# Patient Record
Sex: MALE | Race: WHITE | NOT HISPANIC OR LATINO | Employment: FULL TIME | URBAN - METROPOLITAN AREA
[De-identification: names, ages, dates, MRNs, and addresses within clinical notes are randomized per-mention and may not be internally consistent; named-entity substitution may affect disease eponyms.]

---

## 2020-04-27 DIAGNOSIS — Z00.00 PREVENTATIVE HEALTH CARE: Primary | ICD-10-CM

## 2020-05-05 ENCOUNTER — LAB VISIT (OUTPATIENT)
Dept: LAB | Facility: HOSPITAL | Age: 60
End: 2020-05-05
Attending: INTERNAL MEDICINE

## 2020-05-05 DIAGNOSIS — Z00.00 PREVENTATIVE HEALTH CARE: ICD-10-CM

## 2020-05-05 LAB — SARS-COV-2 IGG SERPLBLD QL IA.RAPID: NEGATIVE

## 2020-05-05 PROCEDURE — 36415 COLL VENOUS BLD VENIPUNCTURE: CPT

## 2020-05-05 PROCEDURE — 86769 SARS-COV-2 COVID-19 ANTIBODY: CPT

## 2023-03-03 ENCOUNTER — TELEPHONE (OUTPATIENT)
Dept: HEMATOLOGY/ONCOLOGY | Facility: CLINIC | Age: 63
End: 2023-03-03
Payer: COMMERCIAL

## 2023-03-03 NOTE — TELEPHONE ENCOUNTER
----- Message from Zuly Smith sent at 2/24/2023 11:06 AM CST -----  Regarding: Outside Referral  GM. Mr. Fischer has been diagnosed with prostate cancer. He is getting his scans done this Tuesday from his Urologist and would like to come see someone at [facility] asap shortly after. Can you please call Mr. Fischer to get more info of who's been treating him and how to get his records over? [phone]  Thank you.

## 2023-03-03 NOTE — TELEPHONE ENCOUNTER
Oncology nurse navigator contacted patient for scheduling referral by member of the [facility] board. All appointments scheduled for [facility] clinic on morning of March 14. Date, time, and location discussed with patient. All questions/concerns addressed. Patient verbalized understanding. Contact information reviewed for further assistance.          Late entry- initial contact with patient on 2/27/23- discussed upcoming scans scheduled at [facility] and [facility]. Patient will request disc upon completion. Contact information provided and informed that he would be contacted for scheduling after scan competion.

## 2023-03-06 NOTE — PROGRESS NOTES
Subjective:       Patient ID: Jose Fischer    Chief Complaint: Consult    HPI    Jose Fischer is a 62 y.o. male, referred by Self, Aaareferral, to clinic for evaluation and management of newly dx prostate cancer.     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  No symptoms.       Notes strong fly hx of cancer-- father had colon cancer in his 20s and pancreatic cancer in his 60s.  Multiple other family members with cancer as well.      Oncologic History:      9/30/2016 TRUS prostate biopsy (Dr Samaniego) benign     11/21/2022 PSA 17.72     2/14/2023 TRUS prostate biopsy (Dr Zane Oreilly, [facility] [place] [place])              6/12 starndard corse involved (left only) GS 9 =5+4 (GG5)     2/28/2023 NM bone scan     2/28/2023 CT A/P      Review of Systems   Constitutional:  Negative for appetite change and unexpected weight change.   HENT:  Negative for mouth sores.    Eyes:  Negative for visual disturbance.   Respiratory:  Negative for cough and shortness of breath.    Cardiovascular:  Negative for chest pain.   Gastrointestinal:  Negative for abdominal pain and diarrhea.   Genitourinary:  Positive for frequency.   Musculoskeletal:  Negative for back pain.   Skin:  Negative for rash.   Neurological:  Negative for headaches.   Hematological:  Negative for adenopathy.   Psychiatric/Behavioral:  The patient is not nervous/anxious.        Allergies:  Review of patient's allergies indicates:  No Known Allergies    Medications:  Current Outpatient Medications   Medication Sig Dispense Refill    sildenafiL (VIAGRA) 50 MG tablet Take 50 mg by mouth daily as needed for Erectile Dysfunction.       No current facility-administered medications for this visit.       PMH:  Past Medical History:   Diagnosis Date    Depression     Prostate cancer        PSH:  Past Surgical History:   Procedure Laterality Date     PROSTATE BIOPSY  02/14/2023       FamHx:  No family history on file.    SocHx:  Social History     Socioeconomic History    Marital status: Single   Tobacco Use    Smoking status: Former    Smokeless tobacco: Never   Substance and Sexual Activity    Alcohol use: No    Drug use: No    Sexual activity: Not Currently     Partners: Female       Objective:      /68 (BP Location: Left arm, Patient Position: Sitting, BP Method: Medium (Automatic))   Pulse (!) 54   Temp 97.7 °F (36.5 °C) (Oral)   Resp 18   Ht 6' (1.829 m)   Wt 68.4 kg (150 lb 12.7 oz)   SpO2 98%   BMI 20.45 kg/m²     Physical Exam  Vitals and nursing note reviewed.   Constitutional:       General: He is not in acute distress.     Appearance: Normal appearance. He is normal weight. He is not ill-appearing, toxic-appearing or diaphoretic.   HENT:      Head: Normocephalic.      Nose: Nose normal.   Eyes:      General: No scleral icterus.        Right eye: No discharge.         Left eye: No discharge.      Conjunctiva/sclera: Conjunctivae normal.   Musculoskeletal:         General: No swelling, tenderness or deformity.   Skin:     Coloration: Skin is not jaundiced or pale.      Findings: No bruising.   Neurological:      General: No focal deficit present.      Mental Status: He is alert and oriented to person, place, and time.   Psychiatric:         Mood and Affect: Mood normal.         Behavior: Behavior normal.         Thought Content: Thought content normal.         Judgment: Judgment normal.         LABS:  No results found for: WBC, HGB, HCT, PLT    Chemistry    No results found for: NA, K, CL, CO2, BUN, CREATININE, GLU No results found for: CALCIUM, ALKPHOS, AST, ALT, BILITOT, ESTGFRAFRICA, EGFRNONAA         Assessment:       1. Prostate cancer          Plan:       Prostate Cancer:    As above, high-risk Arlen 9, PSA 17 prostate cancer.      Discussed options for localized prostate cancer XRT+ADT vs RP    Advise imaging to complete staging  and r/o oligometastatic disease (CT/bone scan +/- PSMA scans).      Also needs genetic testing given dx and fly hx, will refer to hereditary clinic.      Pt also seeing uro onc and rad onc today.      RTC pending results of imaging.      The patient agrees with the plan, and all questions have been answered to their satisfaction.      More than 60 mins total time were spent during this encounter.    Ronald Elizabeth M.D., M.S., F.A.C.P.  Hematology and Oncology Attending  [facility]            Route Chart for Scheduling    Med Onc Chart Routing      Follow up with physician Other. RTC pending - REFER to genetics (hereditary/high risk clinic) ASAP.   Follow up with BLAISE    Infusion scheduling note    Injection scheduling note    Labs    Imaging    Pharmacy appointment    Other referrals  Additional referrals needed

## 2023-03-14 ENCOUNTER — OFFICE VISIT (OUTPATIENT)
Dept: HEMATOLOGY/ONCOLOGY | Facility: CLINIC | Age: 63
End: 2023-03-14
Payer: COMMERCIAL

## 2023-03-14 ENCOUNTER — OFFICE VISIT (OUTPATIENT)
Dept: RADIATION ONCOLOGY | Facility: CLINIC | Age: 63
End: 2023-03-14
Payer: COMMERCIAL

## 2023-03-14 ENCOUNTER — OFFICE VISIT (OUTPATIENT)
Dept: UROLOGY | Facility: CLINIC | Age: 63
End: 2023-03-14
Payer: COMMERCIAL

## 2023-03-14 VITALS
SYSTOLIC BLOOD PRESSURE: 110 MMHG | HEIGHT: 72 IN | DIASTOLIC BLOOD PRESSURE: 68 MMHG | RESPIRATION RATE: 16 BRPM | BODY MASS INDEX: 20.43 KG/M2 | WEIGHT: 150.81 LBS | HEART RATE: 54 BPM

## 2023-03-14 VITALS
BODY MASS INDEX: 20.43 KG/M2 | HEART RATE: 54 BPM | TEMPERATURE: 98 F | SYSTOLIC BLOOD PRESSURE: 110 MMHG | OXYGEN SATURATION: 98 % | WEIGHT: 150.81 LBS | HEIGHT: 72 IN | DIASTOLIC BLOOD PRESSURE: 68 MMHG | RESPIRATION RATE: 18 BRPM

## 2023-03-14 VITALS
HEIGHT: 72 IN | HEART RATE: 54 BPM | SYSTOLIC BLOOD PRESSURE: 109 MMHG | DIASTOLIC BLOOD PRESSURE: 63 MMHG | BODY MASS INDEX: 20.63 KG/M2

## 2023-03-14 DIAGNOSIS — C61 PROSTATE CANCER: Primary | ICD-10-CM

## 2023-03-14 DIAGNOSIS — C61 PROSTATE CANCER: ICD-10-CM

## 2023-03-14 PROCEDURE — 1160F RVW MEDS BY RX/DR IN RCRD: CPT | Mod: CPTII,S$GLB,, | Performed by: STUDENT IN AN ORGANIZED HEALTH CARE EDUCATION/TRAINING PROGRAM

## 2023-03-14 PROCEDURE — 3078F PR MOST RECENT DIASTOLIC BLOOD PRESSURE < 80 MM HG: ICD-10-PCS | Mod: CPTII,S$GLB,, | Performed by: UROLOGY

## 2023-03-14 PROCEDURE — 1160F PR REVIEW ALL MEDS BY PRESCRIBER/CLIN PHARMACIST DOCUMENTED: ICD-10-PCS | Mod: CPTII,S$GLB,, | Performed by: UROLOGY

## 2023-03-14 PROCEDURE — 99215 OFFICE O/P EST HI 40 MIN: CPT | Mod: S$GLB,,, | Performed by: INTERNAL MEDICINE

## 2023-03-14 PROCEDURE — 1159F MED LIST DOCD IN RCRD: CPT | Mod: CPTII,S$GLB,, | Performed by: STUDENT IN AN ORGANIZED HEALTH CARE EDUCATION/TRAINING PROGRAM

## 2023-03-14 PROCEDURE — 3078F PR MOST RECENT DIASTOLIC BLOOD PRESSURE < 80 MM HG: ICD-10-PCS | Mod: CPTII,S$GLB,, | Performed by: INTERNAL MEDICINE

## 2023-03-14 PROCEDURE — 3074F SYST BP LT 130 MM HG: CPT | Mod: CPTII,S$GLB,, | Performed by: UROLOGY

## 2023-03-14 PROCEDURE — 3008F BODY MASS INDEX DOCD: CPT | Mod: CPTII,S$GLB,, | Performed by: UROLOGY

## 2023-03-14 PROCEDURE — 3074F PR MOST RECENT SYSTOLIC BLOOD PRESSURE < 130 MM HG: ICD-10-PCS | Mod: CPTII,S$GLB,, | Performed by: UROLOGY

## 2023-03-14 PROCEDURE — 99999 PR PBB SHADOW E&M-EST. PATIENT-LVL IV: CPT | Mod: PBBFAC,,, | Performed by: INTERNAL MEDICINE

## 2023-03-14 PROCEDURE — 99999 PR PBB SHADOW E&M-EST. PATIENT-LVL III: CPT | Mod: PBBFAC,,, | Performed by: STUDENT IN AN ORGANIZED HEALTH CARE EDUCATION/TRAINING PROGRAM

## 2023-03-14 PROCEDURE — 1159F MED LIST DOCD IN RCRD: CPT | Mod: CPTII,S$GLB,, | Performed by: UROLOGY

## 2023-03-14 PROCEDURE — 3078F DIAST BP <80 MM HG: CPT | Mod: CPTII,S$GLB,, | Performed by: STUDENT IN AN ORGANIZED HEALTH CARE EDUCATION/TRAINING PROGRAM

## 2023-03-14 PROCEDURE — 99205 OFFICE O/P NEW HI 60 MIN: CPT | Mod: S$GLB,,, | Performed by: STUDENT IN AN ORGANIZED HEALTH CARE EDUCATION/TRAINING PROGRAM

## 2023-03-14 PROCEDURE — 3008F BODY MASS INDEX DOCD: CPT | Mod: CPTII,S$GLB,, | Performed by: INTERNAL MEDICINE

## 2023-03-14 PROCEDURE — 1160F RVW MEDS BY RX/DR IN RCRD: CPT | Mod: CPTII,S$GLB,, | Performed by: UROLOGY

## 2023-03-14 PROCEDURE — 99205 PR OFFICE/OUTPT VISIT, NEW, LEVL V, 60-74 MIN: ICD-10-PCS | Mod: S$GLB,,, | Performed by: UROLOGY

## 2023-03-14 PROCEDURE — 3074F SYST BP LT 130 MM HG: CPT | Mod: CPTII,S$GLB,, | Performed by: STUDENT IN AN ORGANIZED HEALTH CARE EDUCATION/TRAINING PROGRAM

## 2023-03-14 PROCEDURE — 3008F PR BODY MASS INDEX (BMI) DOCUMENTED: ICD-10-PCS | Mod: CPTII,S$GLB,, | Performed by: STUDENT IN AN ORGANIZED HEALTH CARE EDUCATION/TRAINING PROGRAM

## 2023-03-14 PROCEDURE — 99999 PR PBB SHADOW E&M-EST. PATIENT-LVL IV: ICD-10-PCS | Mod: PBBFAC,,, | Performed by: INTERNAL MEDICINE

## 2023-03-14 PROCEDURE — 99205 OFFICE O/P NEW HI 60 MIN: CPT | Mod: S$GLB,,, | Performed by: UROLOGY

## 2023-03-14 PROCEDURE — 3008F PR BODY MASS INDEX (BMI) DOCUMENTED: ICD-10-PCS | Mod: CPTII,S$GLB,, | Performed by: UROLOGY

## 2023-03-14 PROCEDURE — 3078F DIAST BP <80 MM HG: CPT | Mod: CPTII,S$GLB,, | Performed by: UROLOGY

## 2023-03-14 PROCEDURE — 1159F PR MEDICATION LIST DOCUMENTED IN MEDICAL RECORD: ICD-10-PCS | Mod: CPTII,S$GLB,, | Performed by: STUDENT IN AN ORGANIZED HEALTH CARE EDUCATION/TRAINING PROGRAM

## 2023-03-14 PROCEDURE — 99205 PR OFFICE/OUTPT VISIT, NEW, LEVL V, 60-74 MIN: ICD-10-PCS | Mod: S$GLB,,, | Performed by: STUDENT IN AN ORGANIZED HEALTH CARE EDUCATION/TRAINING PROGRAM

## 2023-03-14 PROCEDURE — 1160F PR REVIEW ALL MEDS BY PRESCRIBER/CLIN PHARMACIST DOCUMENTED: ICD-10-PCS | Mod: CPTII,S$GLB,, | Performed by: STUDENT IN AN ORGANIZED HEALTH CARE EDUCATION/TRAINING PROGRAM

## 2023-03-14 PROCEDURE — 1159F PR MEDICATION LIST DOCUMENTED IN MEDICAL RECORD: ICD-10-PCS | Mod: CPTII,S$GLB,, | Performed by: UROLOGY

## 2023-03-14 PROCEDURE — 3078F DIAST BP <80 MM HG: CPT | Mod: CPTII,S$GLB,, | Performed by: INTERNAL MEDICINE

## 2023-03-14 PROCEDURE — 3074F PR MOST RECENT SYSTOLIC BLOOD PRESSURE < 130 MM HG: ICD-10-PCS | Mod: CPTII,S$GLB,, | Performed by: INTERNAL MEDICINE

## 2023-03-14 PROCEDURE — 3074F PR MOST RECENT SYSTOLIC BLOOD PRESSURE < 130 MM HG: ICD-10-PCS | Mod: CPTII,S$GLB,, | Performed by: STUDENT IN AN ORGANIZED HEALTH CARE EDUCATION/TRAINING PROGRAM

## 2023-03-14 PROCEDURE — 3074F SYST BP LT 130 MM HG: CPT | Mod: CPTII,S$GLB,, | Performed by: INTERNAL MEDICINE

## 2023-03-14 PROCEDURE — 99999 PR PBB SHADOW E&M-EST. PATIENT-LVL III: ICD-10-PCS | Mod: PBBFAC,,, | Performed by: UROLOGY

## 2023-03-14 PROCEDURE — 3008F PR BODY MASS INDEX (BMI) DOCUMENTED: ICD-10-PCS | Mod: CPTII,S$GLB,, | Performed by: INTERNAL MEDICINE

## 2023-03-14 PROCEDURE — 99215 PR OFFICE/OUTPT VISIT, EST, LEVL V, 40-54 MIN: ICD-10-PCS | Mod: S$GLB,,, | Performed by: INTERNAL MEDICINE

## 2023-03-14 PROCEDURE — 99999 PR PBB SHADOW E&M-EST. PATIENT-LVL III: ICD-10-PCS | Mod: PBBFAC,,, | Performed by: STUDENT IN AN ORGANIZED HEALTH CARE EDUCATION/TRAINING PROGRAM

## 2023-03-14 PROCEDURE — 3008F BODY MASS INDEX DOCD: CPT | Mod: CPTII,S$GLB,, | Performed by: STUDENT IN AN ORGANIZED HEALTH CARE EDUCATION/TRAINING PROGRAM

## 2023-03-14 PROCEDURE — 3078F PR MOST RECENT DIASTOLIC BLOOD PRESSURE < 80 MM HG: ICD-10-PCS | Mod: CPTII,S$GLB,, | Performed by: STUDENT IN AN ORGANIZED HEALTH CARE EDUCATION/TRAINING PROGRAM

## 2023-03-14 PROCEDURE — 99999 PR PBB SHADOW E&M-EST. PATIENT-LVL III: CPT | Mod: PBBFAC,,, | Performed by: UROLOGY

## 2023-03-14 RX ORDER — SILDENAFIL 50 MG/1
50 TABLET, FILM COATED ORAL DAILY PRN
COMMUNITY
End: 2023-09-08

## 2023-03-14 NOTE — PROGRESS NOTES
Radiation Oncology Consult Note         Date of Service: 03/14/2023    Chief Complaint: prostate cancer     Reason for visit: consideration for radiation to the pelvis     Referring Physician: self referred (last saw Dr Zane Oreilly, [facility], [place])     Implantable devices: denies    Therapy to Date:  No radiation    Diagnosis/Assessment:   Jose Fischer is a 62 y.o. man with very high risk prostate adenocarcinoma Stage (cT1c, cM0, PSA: 17.7, Grade Group: 5), s/p TRUS prostate biopsy (Dr Zane Oreilly, [facility], [place] on 2/14/2023) with 6/12 standard corse involved (left only) GS 9 =5+4 (GG5)    PMH of vasectomy and ED (on Viagra)    MSK nomogram risk EPE, SVI, LNI (%,%,%): 81,47,40    ECOG 0    Plan   We discussed treatment options per NCCN guidelines v2023:  - EBRT + long-term ADT (1.5 - 3 years) + zytiga/pred  - RP +/- PLND     EBRT would consist of daily radiation treatments M-F, 70Gy in 28 fractions to the prostate gland + regional pelvic lymph nodes.      Risks, benefits, and side effects of radiotherapy were discussed.   Side effects include but are not limited to fatigue, erythema, hyperpigmentation, desquamation within the radiation field, more frequent urination, both during the day and at night, urgency with urination, hematuria, dysuria, and a sensation of incomplete emptying.   In addition, the patient will be at risk for increased frequency and/or loose bowel movements.   All of these side effects will go away in the short term.   In the long term, the patient is at risk for radiation cystitis, radiation proctitis, and erectile dysfunction.     The patient would like to think about his options, leaning to surgery as he dreads getting ED on ADT  His urinary issues are expected to improve on ADT, as I believe they are cancer related    Agree with PSMA to get more accurate hosea and metastatic staging     - recommend MRI if surgery considered  - last colonoscopy 1 year ago at OSH  -  "Epic- 26 survey filled on line  - return to [facility]        HPI:   Jose Fischer is a 62 y.o. man with recent diagnosis of prostate cancer after presenting with elevated PSA.    Oncologic history:    9/30/2016 TRUS prostate biopsy (Dr Samaniego) benign    11/21/2022 PSA 17.72    2/14/2023 TRUS prostate biopsy (Dr Zane Oreilly, [facility], [place])   6/12 standard corse involved (left only) GS 9 =5+4 (GG5)    2/28/2023 NM bone scan (outside)  No evidence of mets    2/28/2023 CT A/P   No mets  Subjective:   In clinic the patient is accompanied by his cousin Karla Craven    The patient reports feeling "good"  The patient denies major bowel or sexual function issues (on Viagra with good response)  He reports significant LUTS (see AUA), no dysuria  Not on Flomax  No issues with NSAIDs  Has regular BMs qAM  The patient denies other major complaints    AUA score 23 (3,5,3,4,2,1,5) mostly satisfied  AMALIA score 25 (5,5,5,5,5) no ED     The patient denies any history of radiation therapy, implantable cardiac devices, or connective tissue disease.    Social history      From [place]    Family history  Father CRC and pancreatic cancer    Current Outpatient Medications on File Prior to Visit   Medication Sig Dispense Refill    sildenafiL (VIAGRA) 50 MG tablet Take 50 mg by mouth daily as needed for Erectile Dysfunction.      [DISCONTINUED] ciprofloxacin HCl (CIPRO) 500 MG tablet Take one night before and morning of prostate biopsy.  Continue taking it twice a day. 4 tablet 0    [DISCONTINUED] LATUDA 40 mg Tab tablet TAKE 1 TABLET BY MOUTH DAILY AFTER A MEAL  5    [DISCONTINUED] lorazepam (ATIVAN) 1 MG tablet TK 1 T PO QD  5    [DISCONTINUED] venlafaxine (EFFEXOR) 75 MG tablet TK 1 T PO BID WITH MEALS  5     No current facility-administered medications on file prior to visit.     Review of patient's allergies indicates:  No Known Allergies    Past Surgical History:   Procedure Laterality Date    PROSTATE " BIOPSY  02/14/2023       Past Medical History:   Diagnosis Date    Depression     Prostate cancer        Review of systems  Constitutional:  Negative for fatigue, fever and unexpected weight change.   HENT:  Negative for hearing loss and sinus pressure/congestion.    Eyes:  Negative for visual disturbance.   Respiratory:  Negative for cough, shortness of breath and wheezing.    Cardiovascular:  Negative for chest pain, palpitations and leg swelling.   Gastrointestinal:  Negative for abdominal pain, blood in stool, constipation, diarrhea, nausea, vomiting and reflux.   Genitourinary:  Positive for frequency and urgency. Negative for dysuria, erectile dysfunction and hematuria.   Musculoskeletal:  Negative for arthralgias, back pain and neck pain.   Integumentary:  Negative for rash.   Neurological:  Negative for dizziness, seizures, speech difficulty, weakness, numbness, headaches and memory loss.   Psychiatric/Behavioral:  Negative for dysphoric mood. The patient is not nervous/anxious.          Objective:      Physical Exam  Vitals reviewed.     Constitutional:       Appearance: Normal appearance.   HENT:      Head: Normocephalic and atraumatic.   Eyes:      Conjunctiva/sclera: Conjunctivae normal.   Pulmonary:      Effort: Pulmonary effort is normal.   Abdominal:      General: There is no distension.   Genitourinary:     Comments: GAURANG deferred  Musculoskeletal:         General: Normal range of motion.  Neurological:      General: No focal deficit present.      Mental Status: Alert and oriented  Psychiatric:         Mood and Affect: Mood normal.         Behavior: Behavior normal.      Imaging: I have personally reviewed the patient's available images and reports and summarized pertinent findings above in HPI.      Pathology: I have personally reviewed the patient's available pathology and summarized pertinent findings above in HPI.      Laboratory: I have personally reviewed the patient's available laboratory  values and summarized pertinent findings above in HPI.         I spent approximately 60 minutes reviewing the available records and evaluating the patient, out of which over 50% of the time was spent face to face with the patient in counseling and coordinating this patient's care.     Thank you for the opportunity to care for this patient. Please do not hesitate to contact me with any questions.     Ramiro Lake MD/PhD

## 2023-03-14 NOTE — PROGRESS NOTES
Subjective:       Patient ID: Jose Fischer is a 62 y.o. male.    Chief Complaint:  Prostate cancer    History of Present Illness  62M w/ hx of recently diagnosed very high risk prostate cancer presents for [facility] visit.  He also has appointments today with med onc, rad onc.    He presented initially with difficulty initiating strea, nocturia x8-10, and elevated PSA to 17.7.  Prostate biopsy 2/17/23 at [facility] showing 5+4 in LB (28% involvement) 2/2 cores, 4+3 in LM (21% involvement) 2/2 cores, 4+5 in LA (52% involvement) 2/2 cores, 6/12 cores total.    Bone Scan 2/28/23 with no evidence of mets.   CTAP 2/28/23 significant for 4 mm intrarenal L kidney stone and 3 mm non obstructing R UVJ stone.  No evidence of mets. He denies a hx of stones and is asymptomatic from a stone perspective.  He also reports that his urinary symptoms have mostly resolved recently following his prostate ca diagnosis. He does have a history of erectile dysfunction at baseline and currently takes sildenafil.       Denies having any other recent imaging including PSMA or prostate MRI.  He also denies any genetic testing.       Has a remote hx of negative TRUS biopsy in 2016 with Dr. Samaniego.  His PSA at that time was 3.8.     No FH of prostate cancer.   Overall healthy, no significant past medical history.  No blood thinners.    PSH: none         Review of Systems  Review of Systems   Constitutional:  Negative for chills, fever and unexpected weight change.   Genitourinary:  Positive for difficulty urinating. Negative for flank pain and hematuria.        Objective:     Physical Exam  Constitutional:       General: He is not in acute distress.  HENT:      Head: Normocephalic.   Eyes:      Extraocular Movements: Extraocular movements intact.   Cardiovascular:      Rate and Rhythm: Normal rate.   Pulmonary:      Effort: Pulmonary effort is normal. No respiratory distress.   Abdominal:      Palpations: Abdomen is soft.  There is no mass.      Tenderness: There is no abdominal tenderness.   Musculoskeletal:         General: No swelling or deformity.      Cervical back: Normal range of motion.   Skin:     General: Skin is warm and dry.   Neurological:      General: No focal deficit present.      Mental Status: He is alert.   Psychiatric:         Mood and Affect: Mood normal.         Behavior: Behavior normal.     Lab Review      No results found for: COLORU, SPECGRAV, PHUR, WBCUR, NITRITE, PROTEINUR, GLUCOSEUR, KETONESU, UROBILINOGEN, BILIRUBINUR, RBCUR    Last PSA 17.7     Assessment:        1. Prostate cancer              Plan:     Prostate cancer    Today's visit was spent almost entirely on counseling. 45 minutes of counseling time was spent.  We reviewed his diagnosis, stage, grade, and prognosis.  We reviewed the [facility] nomograms. We discussed the concept of low risk, moderate risk, and high risk disease. We discussed the different treatment options including active surveillance (as well as surveillance protocol), prostate brachytherapy,external bean radiation, and both open and robotic prostatectomy.We also discussed the advantages, disadvantages, risks and benefits of the different options. Regarding radiation therapy we discussed treatment planning, the different techniques, short and long term complications. These included radiation cystitis, radiation proctitis, and impotence. We discussed success, failure, and salvage therapeutic options.     We discussed surgical therapy in depth including preoperative preparation, surgical technique (including bladder neck and nerve-sparing techniques), postoperative recuperation and recovery, and short and long term complications. We discussed the risks of reoperation, incontinence and impotence. We discussed the chance of rectal injury, obturator nerve injury, and occult injury to the bowel during verress needle access.  We discussed preop and postop Kegels, post op penile  rehab, and treatment options for incontinence and impotence. We discussed success, failure and salvage therapeutic options. We discussed the possible  indications for adjuvant radiation therapy.    -CSS   15yr  68%  PFS    5 yr   27%     10yr   16%  OCD  10%  SRIDEVI   87%  LN+    40%  SV+    41%      At the conclusion of our discussion, the patient was interested in surgery.  However, we did extensively explain the high risk of primary surgical failure in need for multimodal therapy.  In addition, we discussed that given his significant chance of locally advanced prostate cancer even metastatic prostate cancer given the high-grade and high PSA, we will get advanced imaging with a PSMA pet scan to evaluate for metastatic disease..     I answered all his questions.    I encouraged him or any of his family members to call or email me with questions and/or concerns.    Patient to see heme Onc and Radiation Oncology today    The patient was seen and examined with the resident physician.  All questions were answered.  The plan was discussed with the patient and I concur with the resident physician's documentation.

## 2023-03-15 ENCOUNTER — PATIENT MESSAGE (OUTPATIENT)
Dept: HEMATOLOGY/ONCOLOGY | Facility: CLINIC | Age: 63
End: 2023-03-15
Payer: COMMERCIAL

## 2023-03-21 ENCOUNTER — PATIENT MESSAGE (OUTPATIENT)
Dept: UROLOGY | Facility: CLINIC | Age: 63
End: 2023-03-21
Payer: COMMERCIAL

## 2023-03-24 ENCOUNTER — LAB VISIT (OUTPATIENT)
Dept: LAB | Facility: HOSPITAL | Age: 63
End: 2023-03-24
Attending: UROLOGY
Payer: COMMERCIAL

## 2023-03-24 DIAGNOSIS — C61 PROSTATE CANCER: Primary | ICD-10-CM

## 2023-03-24 DIAGNOSIS — C61 PROSTATE CANCER: ICD-10-CM

## 2023-03-24 PROCEDURE — 88321 PR  MICROSLIDE CONSULT: ICD-10-PCS | Mod: ,,, | Performed by: PATHOLOGY

## 2023-03-24 PROCEDURE — 88321 CONSLTJ&REPRT SLD PREP ELSWR: CPT | Mod: ,,, | Performed by: PATHOLOGY

## 2023-03-24 PROCEDURE — 88321 CONSLTJ&REPRT SLD PREP ELSWR: CPT | Performed by: PATHOLOGY

## 2023-03-27 ENCOUNTER — TELEPHONE (OUTPATIENT)
Dept: HEMATOLOGY/ONCOLOGY | Facility: CLINIC | Age: 63
End: 2023-03-27
Payer: COMMERCIAL

## 2023-03-27 LAB
FINAL PATHOLOGIC DIAGNOSIS: NORMAL
Lab: NORMAL
MICROSCOPIC EXAM: NORMAL

## 2023-03-27 NOTE — TELEPHONE ENCOUNTER
Oncology nurse navigator received incoming call with questions regarding PSMA scan. All questions answered. Patient agreed to contact our team if anything further is needed.

## 2023-03-31 ENCOUNTER — HOSPITAL ENCOUNTER (OUTPATIENT)
Dept: RADIOLOGY | Facility: HOSPITAL | Age: 63
Discharge: HOME | End: 2023-03-31
Attending: UROLOGY
Payer: COMMERCIAL

## 2023-03-31 DIAGNOSIS — C61 PROSTATE CANCER: ICD-10-CM

## 2023-03-31 PROCEDURE — 78815 NM PET CT F 18 PYL PSMA, MIDTHIGH TO VERTEX: ICD-10-PCS | Mod: 26,PI,, | Performed by: RADIOLOGY

## 2023-03-31 PROCEDURE — 78815 PET IMAGE W/CT SKULL-THIGH: CPT | Mod: TC

## 2023-03-31 PROCEDURE — 78815 PET IMAGE W/CT SKULL-THIGH: CPT | Mod: 26,PI,, | Performed by: RADIOLOGY

## 2023-04-03 ENCOUNTER — PATIENT MESSAGE (OUTPATIENT)
Dept: UROLOGY | Facility: CLINIC | Age: 63
End: 2023-04-03
Payer: COMMERCIAL

## 2023-04-04 ENCOUNTER — LAB VISIT (OUTPATIENT)
Dept: LAB | Facility: HOSPITAL | Age: 63
End: 2023-04-04
Attending: UROLOGY
Payer: COMMERCIAL

## 2023-04-04 ENCOUNTER — OFFICE VISIT (OUTPATIENT)
Dept: UROLOGY | Facility: CLINIC | Age: 63
End: 2023-04-04
Payer: COMMERCIAL

## 2023-04-04 VITALS
SYSTOLIC BLOOD PRESSURE: 101 MMHG | HEIGHT: 72 IN | DIASTOLIC BLOOD PRESSURE: 57 MMHG | BODY MASS INDEX: 20.77 KG/M2 | HEART RATE: 69 BPM | WEIGHT: 153.31 LBS

## 2023-04-04 DIAGNOSIS — N40.1 BPH WITH URINARY OBSTRUCTION: ICD-10-CM

## 2023-04-04 DIAGNOSIS — C61 PROSTATE CANCER: Primary | ICD-10-CM

## 2023-04-04 DIAGNOSIS — N13.8 BPH WITH URINARY OBSTRUCTION: ICD-10-CM

## 2023-04-04 DIAGNOSIS — C61 PROSTATE CANCER: ICD-10-CM

## 2023-04-04 LAB — COMPLEXED PSA SERPL-MCNC: 22.1 NG/ML (ref 0–4)

## 2023-04-04 PROCEDURE — 1160F RVW MEDS BY RX/DR IN RCRD: CPT | Mod: CPTII,S$GLB,, | Performed by: UROLOGY

## 2023-04-04 PROCEDURE — 3074F SYST BP LT 130 MM HG: CPT | Mod: CPTII,S$GLB,, | Performed by: UROLOGY

## 2023-04-04 PROCEDURE — 3074F PR MOST RECENT SYSTOLIC BLOOD PRESSURE < 130 MM HG: ICD-10-PCS | Mod: CPTII,S$GLB,, | Performed by: UROLOGY

## 2023-04-04 PROCEDURE — 1160F PR REVIEW ALL MEDS BY PRESCRIBER/CLIN PHARMACIST DOCUMENTED: ICD-10-PCS | Mod: CPTII,S$GLB,, | Performed by: UROLOGY

## 2023-04-04 PROCEDURE — 99215 PR OFFICE/OUTPT VISIT, EST, LEVL V, 40-54 MIN: ICD-10-PCS | Mod: S$GLB,,, | Performed by: UROLOGY

## 2023-04-04 PROCEDURE — 1159F MED LIST DOCD IN RCRD: CPT | Mod: CPTII,S$GLB,, | Performed by: UROLOGY

## 2023-04-04 PROCEDURE — 1159F PR MEDICATION LIST DOCUMENTED IN MEDICAL RECORD: ICD-10-PCS | Mod: CPTII,S$GLB,, | Performed by: UROLOGY

## 2023-04-04 PROCEDURE — 3078F PR MOST RECENT DIASTOLIC BLOOD PRESSURE < 80 MM HG: ICD-10-PCS | Mod: CPTII,S$GLB,, | Performed by: UROLOGY

## 2023-04-04 PROCEDURE — 99999 PR PBB SHADOW E&M-EST. PATIENT-LVL III: ICD-10-PCS | Mod: PBBFAC,,, | Performed by: UROLOGY

## 2023-04-04 PROCEDURE — 3008F PR BODY MASS INDEX (BMI) DOCUMENTED: ICD-10-PCS | Mod: CPTII,S$GLB,, | Performed by: UROLOGY

## 2023-04-04 PROCEDURE — 99999 PR PBB SHADOW E&M-EST. PATIENT-LVL III: CPT | Mod: PBBFAC,,, | Performed by: UROLOGY

## 2023-04-04 PROCEDURE — 99215 OFFICE O/P EST HI 40 MIN: CPT | Mod: S$GLB,,, | Performed by: UROLOGY

## 2023-04-04 PROCEDURE — 3008F BODY MASS INDEX DOCD: CPT | Mod: CPTII,S$GLB,, | Performed by: UROLOGY

## 2023-04-04 PROCEDURE — 36415 COLL VENOUS BLD VENIPUNCTURE: CPT | Performed by: UROLOGY

## 2023-04-04 PROCEDURE — 3078F DIAST BP <80 MM HG: CPT | Mod: CPTII,S$GLB,, | Performed by: UROLOGY

## 2023-04-04 PROCEDURE — 84153 ASSAY OF PSA TOTAL: CPT | Performed by: UROLOGY

## 2023-04-04 NOTE — PROGRESS NOTES
Subjective:       Patient ID: Jose Fischer is a 62 y.o. male.    Chief Complaint:  Prostate cancer    History of Present Illness  62M w/ hx of recently diagnosed very high risk prostate cancer presents for [facility] visit.  Previously had appointments with med onc, rad onc.    He requested a pathology re-review at [facility] which was performed and his prostate cancer was downgraded to [place] 4+3.     He presented initially with difficulty initiating strea, nocturia x8-10, and elevated PSA to 17.7.  Prostate biopsy 2/17/23 at [facility] showing 5+4 in LB (28% involvement) 2/2 cores, 4+3 in LM (21% involvement) 2/2 cores, 4+5 in LA (52% involvement) 2/2 cores, 6/12 cores total.    Bone Scan 2/28/23 with no evidence of mets.   CTAP 2/28/23 significant for 4 mm intrarenal L kidney stone and 3 mm non obstructing R UVJ stone.  No evidence of mets. He denies a hx of stones and is asymptomatic from a stone perspective.  He also reports that his urinary symptoms have mostly resolved recently following his prostate ca diagnosis. He does have a history of erectile dysfunction at baseline and currently takes sildenafil.       Denies having any other recent imaging including PSMA or prostate MRI.  He also denies any genetic testing.       Has a remote hx of negative TRUS biopsy in 2016 with Dr. Samaniego.  His PSA at that time was 3.8.     No FH of prostate cancer.   Overall healthy, no significant past medical history.  No blood thinners.    PSH: none           Review of Systems  Review of Systems   Constitutional:  Negative for chills, fever and unexpected weight change.   Genitourinary:  Positive for difficulty urinating. Negative for flank pain and hematuria.        Objective:     Physical Exam  Constitutional:       General: He is not in acute distress.  HENT:      Head: Normocephalic.   Eyes:      Extraocular Movements: Extraocular movements intact.   Cardiovascular:      Rate and Rhythm: Normal rate.    Pulmonary:      Effort: Pulmonary effort is normal. No respiratory distress.   Abdominal:      Palpations: Abdomen is soft. There is no mass.      Tenderness: There is no abdominal tenderness.   Musculoskeletal:         General: No swelling or deformity.      Cervical back: Normal range of motion.   Skin:     General: Skin is warm and dry.   Neurological:      General: No focal deficit present.      Mental Status: He is alert.   Psychiatric:         Mood and Affect: Mood normal.         Behavior: Behavior normal.     Lab Review      No results found for: COLORU, SPECGRAV, PHUR, WBCUR, NITRITE, PROTEINUR, GLUCOSEUR, KETONESU, UROBILINOGEN, BILIRUBINUR, RBCUR    Last PSA 17.7     Assessment:        1. Prostate cancer    2. BPH with urinary obstruction                Plan:     Prostate cancer  -     Prostate Specific Antigen, Diagnostic; Future; Expected date: 04/04/2023    BPH with urinary obstruction      Today's visit was spent almost entirely on counseling. 45 minutes of counseling time was spent.  We reviewed his diagnosis, stage, grade, and prognosis.  We reviewed the [facility] nomograms. We discussed the concept of low risk, moderate risk, and high risk disease. We discussed the different treatment options including active surveillance (as well as surveillance protocol), prostate brachytherapy,external bean radiation, and both open and robotic prostatectomy.We also discussed the advantages, disadvantages, risks and benefits of the different options. Regarding radiation therapy we discussed treatment planning, the different techniques, short and long term complications. These included radiation cystitis, radiation proctitis, and impotence. We discussed success, failure, and salvage therapeutic options.     We discussed surgical therapy in depth including preoperative preparation, surgical technique (including bladder neck and nerve-sparing techniques), postoperative recuperation and recovery, and short  and long term complications. We discussed the risks of reoperation, incontinence and impotence. We discussed the chance of rectal injury, obturator nerve injury, and occult injury to the bowel during verress needle access.  We discussed preop and postop Kegels, post op penile rehab, and treatment options for incontinence and impotence. We discussed success, failure and salvage therapeutic options. We discussed the possible  indications for adjuvant radiation therapy.    NOMOGRAM RESULTS based on Re-Review of biopsy pathology.   -CSS   15yr  83%  PFS    5 yr   42%     10yr   28%  OCD  23%  SRIDEVI   72%  LN+    21%  SV+    20%    -patient and family interested in repeat psa. Explained that the interpretation of psa may be difficult in the setting of recent prostate biopsy if the psa returns more elevated.     -patient interested in surgery----however, he wanted to discuss and consider his options with family.  He will notify us of the results.

## 2023-04-05 ENCOUNTER — PATIENT MESSAGE (OUTPATIENT)
Dept: UROLOGY | Facility: CLINIC | Age: 63
End: 2023-04-05
Payer: COMMERCIAL

## 2023-04-06 ENCOUNTER — PATIENT MESSAGE (OUTPATIENT)
Dept: HEMATOLOGY/ONCOLOGY | Facility: CLINIC | Age: 63
End: 2023-04-06
Payer: COMMERCIAL

## 2023-04-07 ENCOUNTER — PATIENT MESSAGE (OUTPATIENT)
Dept: RADIATION ONCOLOGY | Facility: CLINIC | Age: 63
End: 2023-04-07
Payer: COMMERCIAL

## 2023-04-11 ENCOUNTER — OFFICE VISIT (OUTPATIENT)
Dept: HEMATOLOGY/ONCOLOGY | Facility: CLINIC | Age: 63
End: 2023-04-11
Payer: COMMERCIAL

## 2023-04-11 ENCOUNTER — LAB VISIT (OUTPATIENT)
Dept: LAB | Facility: HOSPITAL | Age: 63
End: 2023-04-11
Attending: PHYSICIAN ASSISTANT
Payer: COMMERCIAL

## 2023-04-11 ENCOUNTER — OFFICE VISIT (OUTPATIENT)
Dept: RADIATION ONCOLOGY | Facility: CLINIC | Age: 63
End: 2023-04-11
Payer: COMMERCIAL

## 2023-04-11 VITALS
HEART RATE: 49 BPM | RESPIRATION RATE: 18 BRPM | DIASTOLIC BLOOD PRESSURE: 58 MMHG | OXYGEN SATURATION: 98 % | WEIGHT: 152.31 LBS | HEIGHT: 72 IN | SYSTOLIC BLOOD PRESSURE: 101 MMHG | BODY MASS INDEX: 20.63 KG/M2

## 2023-04-11 DIAGNOSIS — C61 PROSTATE CANCER: ICD-10-CM

## 2023-04-11 DIAGNOSIS — Z80.42 FAMILY HISTORY OF PROSTATE CANCER: ICD-10-CM

## 2023-04-11 DIAGNOSIS — Z71.83 ENCOUNTER FOR NONPROCREATIVE GENETIC COUNSELING: Primary | ICD-10-CM

## 2023-04-11 DIAGNOSIS — Z80.0 FAMILY HISTORY OF PANCREATIC CANCER: ICD-10-CM

## 2023-04-11 DIAGNOSIS — Z80.0 FAMILY HISTORY OF COLON CANCER: ICD-10-CM

## 2023-04-11 DIAGNOSIS — C61 PROSTATE CANCER: Primary | ICD-10-CM

## 2023-04-11 PROCEDURE — 1159F MED LIST DOCD IN RCRD: CPT | Mod: CPTII,S$GLB,, | Performed by: STUDENT IN AN ORGANIZED HEALTH CARE EDUCATION/TRAINING PROGRAM

## 2023-04-11 PROCEDURE — 99214 PR OFFICE/OUTPT VISIT, EST, LEVL IV, 30-39 MIN: ICD-10-PCS | Mod: S$GLB,,, | Performed by: STUDENT IN AN ORGANIZED HEALTH CARE EDUCATION/TRAINING PROGRAM

## 2023-04-11 PROCEDURE — 99999 PR PBB SHADOW E&M-EST. PATIENT-LVL III: CPT | Mod: PBBFAC,,, | Performed by: STUDENT IN AN ORGANIZED HEALTH CARE EDUCATION/TRAINING PROGRAM

## 2023-04-11 PROCEDURE — 99215 PR OFFICE/OUTPT VISIT, EST, LEVL V, 40-54 MIN: ICD-10-PCS | Mod: S$GLB,,, | Performed by: PHYSICIAN ASSISTANT

## 2023-04-11 PROCEDURE — 36415 COLL VENOUS BLD VENIPUNCTURE: CPT | Performed by: PHYSICIAN ASSISTANT

## 2023-04-11 PROCEDURE — 1159F PR MEDICATION LIST DOCUMENTED IN MEDICAL RECORD: ICD-10-PCS | Mod: CPTII,S$GLB,, | Performed by: STUDENT IN AN ORGANIZED HEALTH CARE EDUCATION/TRAINING PROGRAM

## 2023-04-11 PROCEDURE — 99215 OFFICE O/P EST HI 40 MIN: CPT | Mod: S$GLB,,, | Performed by: PHYSICIAN ASSISTANT

## 2023-04-11 PROCEDURE — 3074F PR MOST RECENT SYSTOLIC BLOOD PRESSURE < 130 MM HG: ICD-10-PCS | Mod: CPTII,S$GLB,, | Performed by: STUDENT IN AN ORGANIZED HEALTH CARE EDUCATION/TRAINING PROGRAM

## 2023-04-11 PROCEDURE — 3008F BODY MASS INDEX DOCD: CPT | Mod: CPTII,S$GLB,, | Performed by: STUDENT IN AN ORGANIZED HEALTH CARE EDUCATION/TRAINING PROGRAM

## 2023-04-11 PROCEDURE — 3008F PR BODY MASS INDEX (BMI) DOCUMENTED: ICD-10-PCS | Mod: CPTII,S$GLB,, | Performed by: STUDENT IN AN ORGANIZED HEALTH CARE EDUCATION/TRAINING PROGRAM

## 2023-04-11 PROCEDURE — 99999 PR PBB SHADOW E&M-EST. PATIENT-LVL III: ICD-10-PCS | Mod: PBBFAC,,, | Performed by: STUDENT IN AN ORGANIZED HEALTH CARE EDUCATION/TRAINING PROGRAM

## 2023-04-11 PROCEDURE — 1160F RVW MEDS BY RX/DR IN RCRD: CPT | Mod: CPTII,S$GLB,, | Performed by: STUDENT IN AN ORGANIZED HEALTH CARE EDUCATION/TRAINING PROGRAM

## 2023-04-11 PROCEDURE — 3078F PR MOST RECENT DIASTOLIC BLOOD PRESSURE < 80 MM HG: ICD-10-PCS | Mod: CPTII,S$GLB,, | Performed by: STUDENT IN AN ORGANIZED HEALTH CARE EDUCATION/TRAINING PROGRAM

## 2023-04-11 PROCEDURE — 99999 PR PBB SHADOW E&M-EST. PATIENT-LVL II: ICD-10-PCS | Mod: PBBFAC,,, | Performed by: PHYSICIAN ASSISTANT

## 2023-04-11 PROCEDURE — 99999 PR PBB SHADOW E&M-EST. PATIENT-LVL II: CPT | Mod: PBBFAC,,, | Performed by: PHYSICIAN ASSISTANT

## 2023-04-11 PROCEDURE — 3078F DIAST BP <80 MM HG: CPT | Mod: CPTII,S$GLB,, | Performed by: STUDENT IN AN ORGANIZED HEALTH CARE EDUCATION/TRAINING PROGRAM

## 2023-04-11 PROCEDURE — 1160F PR REVIEW ALL MEDS BY PRESCRIBER/CLIN PHARMACIST DOCUMENTED: ICD-10-PCS | Mod: CPTII,S$GLB,, | Performed by: STUDENT IN AN ORGANIZED HEALTH CARE EDUCATION/TRAINING PROGRAM

## 2023-04-11 PROCEDURE — 3074F SYST BP LT 130 MM HG: CPT | Mod: CPTII,S$GLB,, | Performed by: STUDENT IN AN ORGANIZED HEALTH CARE EDUCATION/TRAINING PROGRAM

## 2023-04-11 PROCEDURE — 99214 OFFICE O/P EST MOD 30 MIN: CPT | Mod: S$GLB,,, | Performed by: STUDENT IN AN ORGANIZED HEALTH CARE EDUCATION/TRAINING PROGRAM

## 2023-04-11 NOTE — PROGRESS NOTES
"Department of Hematology and Oncology  [facility]    Cancer Genetics  Initial Consult    Date of Service:  23  Visit Provider:  Charu Mccall PA-C  Collaborating Physician:  Fabi Abdalla MD    Patient:  Jose Fischer  :  1960  MRN:  [id number]     Referring Provider    Ronald Elizabeth MD  [address] [place] [address]    SUBJECTIVE      Chief Complaint: Genetic evaluation  History of Present Illness (HPI):  Jose Fischer ("Jose"), 62 y.o., assigned male sex at birth, is established with the [facility] Department of [facility]ology and [facility] but new to me.  He presents for genetic cancer risk assessment given his recent diagnosis of high-risk prostate cancer and family history of colon and pancreatic cancer in his father.     Genetic Assessment History  Germline cancer-genetic testing: no  Personal history of cancer:  yes  High risk prostate cancer, Arlen 9, localized (2023, age 62)  Benign tumors:  no  Colon polyps: No. Last colonoscopy  was normal. Plan is to repeat in 5 years.   Pancreatitis:  no  Chemoprevention: Never used    Past Medical History:   Diagnosis Date    Depression     Prostate cancer      Patient Active Problem List    Diagnosis Date Noted    Prostate cancer 2023    Elevated prostate specific antigen (PSA) 2016    BPH with urinary obstruction 2016      Family History  Germline cancer-genetic testing in blood relatives:  Yes -   Sister: Negative genetic a few years ago. Is looking for her report.   Ashkenazi Bahai ancestry: No  Consanguinity in ancestors:  No  No known history of cancer of colorectal polyps in extended family other than noted below.     ** If the pedigree is small/illegible, expand this note window horizontally to view the pedigree in a larger format. **      Family History   Problem Relation Age of Onset    Pancreatic cancer Father 53        remote hx of smoking and alcohol    Colon " cancer Father 27    Breast cancer Maternal Aunt 70        ER+    Prostate cancer Paternal Uncle 65        Mets to bone    Lung cancer Paternal Uncle         small cell, +smoker    Breast cancer Other       Review of Systems  See HPI.    Pain 0/10  Recent falls: None   Patient's Distress Score today was 4/10 (with 10 being the worst). Patient attributes this to the diagnosis.  Patient is not experiencing suicidal or homicidal ideations (SI/HI).  Offered patient a referral to Social Work and Psychology, and patient declined.      OBJECTIVE      Physical Exam  Very pleasant patient.  Accompanied by his cousin  Vitals signs:  There were no vitals filed for this visit.   Constitutional      Appearance:  Well developed and well nourished. No apparent distress.   Pulmonary     Effort:  Normal  Neurological     Mental Status:  Alert and oriented.     Coordination:  Normal  Psychiatric        Mood and Affect: Normal     Thought Content:  Normal       Speech:  Normal     Behavior:  Normal     Judgment:  Normal  Genetics-specific     It is my assessment that the patient is ready to proceed with cancer-genetic testing from a psychosocial perspective.    COUNSELING      Cancer Genetics     Every cell in your body has a copy of your genes, which are segments of DNA that work as an instruction manual to tell the cells how to grow and divide. If a gene has a mutation (abnormality) it could send the wrong instructions to the cells, causing them to grow and divide out of control and develop into cancer cells.     How does this happen?  Random DNA mistakes can occur as the cells multiply.   The DNA can be altered by carcinogens, such as the chemicals in tobacco smoke, UV rays from the sun, and certain infections like HPV (human papillomavirus).   Abnormal DNA can be inherited from our parents.     How are genetic mutations detected?  Germline testing: Tests the blood or saliva to detect inherited genetic mutations the person was born  with. Every cell in the body (blood, saliva, skin, organs, eggs, sperm) will have the abnormal gene which can be passed on to the individual's children.    Somatic testing: Tests the cancer cells to detect acquired genetic mutations that occurred over the course of the individual's life. These abnormal genes are only present in the cancer cells and cannot be passed on to the individual's children.     Sporadic cancer  Approximately 80% of all cancers are sporadic or random. These cancers are caused by an accumulation of genetic mutations acquired over the course of an individual's lifetime. The risk for acquired mutations increases with age and can be influenced by environmental factors (chemical and radiation exposures), lifestyle factors (smoking, alcohol, obesity, lack of exercise, poor diet), and certain medical conditions (diabetes, liver disease, infections). Because cancer is common, some families will have more than one person with the same type of sporadic cancer. The most common cancers are breast, lung, colorectal and prostate. Most sporadic cancers occur after age 60.    Familial cancer  Familial cancer refers to a clustering of cancer in a family that is more than you would expect to see by chance, but it is not hereditary. While hereditary cancer is caused by an inherited mutation in a single gene, familial cancers are caused by a combination of multiple factors, some of which may be inherited (like diabetes or fatty liver disease) and some related to shared lifestyle and environmental factors. Like sporadic cancer, familial cancers are usually diagnosed at older ages and don't follow the same inheritance patterns seen in hereditary cancers.     Hereditary cancer  Approximately 5-10% of all cancers are hereditary, meaning they are caused by an inherited mutation in a single gene, such as BRCA1. These mutations are passed directly from parent to child, with each child having a 50% chance of inheriting  the mutation. Hereditary cancer is suspected in individuals with early-onset cancer (usually before age 50), rare cancers (such as male breast cancer), and/or multiple blood relatives in successive generations with the same or related cancers (for example breast, ovarian, prostate and pancreatic cancer, which are all associated with BRCA gene mutations). Germline testing will show that the relatives have the exact same genetic mutation that is known to cause cancer.         Results of Genetic Testing     Positive:  This means the individual has an inherited mutation that is known to cause cancer and/or other disease.   Each gene is associated with different cancers and different levels of risk. Most genes have specific guidelines for cancer screening and risk reduction that are different from what is done by the general population. With proper management, cancer can be prevented or caught in the early stages when it is treatable and curable.   Relatives will need testing to see if they also have the mutation. Cascade testing refers to the testing of relatives in a particular order. Siblings and parents are the first to be tested, as each has a 50% chance of having the mutation. Once it is determined which side of the family the mutation came from, testing is extended to the aunts and uncles on that side. Mutations don't skip generations, so if an individual tests negative for the familial mutation, their children cannot have the mutation and do not need testing.    Negative:  This means the individual has no inherited mutations that are known to cause cancer and/or other disease.   If the individual has cancer, this could mean their cancer is sporadic or that they have a hereditary cancer but their inherited mutation is not detectable using current testing technology. Genetics is a rapidly evolving field. If the personal or family history is suspicious for hereditary cancer, updated testing may be recommended in the  future.   If the individual does not have cancer, this is considered an uninformative negative since the results don't tell us if their relative(s) with cancer have sporadic cancers or have a genetic mutation the individual did not inherit. For this reason, the most informative individuals to have genetic testing are those with a history of cancer.   Despite the negative result, the individual may be at higher risk for the cancers in the family compared to someone with no family history of cancer. This is due to shared lifestyle, environment and other inherited risk factors.   VUS:  Individuals are born with many harmless mutations that don't affect the function of the gene. A variant of uncertain significance (VUS) is a change in a gene with insufficient evidence to determine if it is harmful or harmless.   Eventually, researchers obtain enough data on the variant to reclassify it. When this is done, the genetics lab will send a notification to the individual and the ordering provider.   Most variants (approximately 91%) are reclassified as harmless.   No actions are needed for a VUS other than checking in with the genetics lab or the genetics team periodically to see if it has been reclassified.     Genetic Discrimination     Genetic discrimination occurs when individuals are discriminated against on the basis of their genetic information.    The Genetic Information Nondiscrimination Act of 2008 (ERIBERTO) is U.S. federal legislation that provides some protections against use of an individual's genetic information by their health insurer and by their employer.  The are two parts to ERIBERTO:  Title I and Title II.    Title I of ERIBERTO prohibits most health insurers from utilizing an individual's genetic information to make decisions regarding insurance eligibility or premium charges.      Title II of ERIBERTO prohibits covered entities, in many cases, from requesting or requiring the genetic information of employees and  applicants and from using said information to make employment decisions.  This protection does not apply to employers with fewer than 15 employees or to the .    ERIBERTO does not protect individuals from genetic discrimination by any other type of policy or entity, including but not limited to life insurance, disability insurance, long-term care insurance,  benefits, and [facility] benefits.    Genetic Testing Logistics     In most cases, a blood sample is collected at an [facility] lab and sent to an outside genetics lab for testing. In some cases, a saliva sample is collected by the patient at home and mailed to the genetics lab.     There is a potential for the patient to incur out-of-pocket costs related to genetic testing.     Questions were encouraged and answered to the patient's satisfaction, and he verbalized understanding of information and agreement with the plan.       ASSESSMENT/PLAN      A cancer-genetic evaluation and pre-test genetic counseling were conducted. Based on the information provided by Jose, his personal and/or family history is suggestive of a potential hereditary predisposition to cancer. The recommendation is to proceed with germline testing to include the genes commonly associated with hereditary pancreatic cancer (ROLLY, BRCA1, BRCA2, CDKN2A, EPCAM, MLH1, MSH2, MSH6, PALB2, PMS2, PRSS1, STK11, TP53), prostate cancer (ROLLY, BRCA1, BRCA2, CHEK2, EPCAM, HOXB13, MLH1, MSH2, MSH6, NBN, PALB2, PMS2, RAD51D, TP53), and colorectal cancer and polyp syndromes (APC, AXIN2, BMPR1A, CHEK2, EPCAM, GREM1, MLH1, MSH2, MSH3, MSH6, MUTYH, NTHL1, PMS2, POLD1, POLE, PTEN, SMAD4, STK11, TP53).     Jose was given the option of proceeding with testing now, deferring testing to a later date, or declining testing and opted to proceed.     Genetics lab: [facility]   Genetic test: [facility] BRCA1/2 core panel + Multi-Cancer +RNA Panel, 84 genes (639401.1)   Verbal informed consent: Obtained    Written informed consent: Obtained   Specimen type: Blood   Specimen collection by: [facility]    Specimen collection date: 4/11/23   Results expected by: Approximately 2-3 weeks after the genetic testing lab receives the specimen   Results disclosure plan: Post-test visit        1. Encounter for nonprocreative genetic counseling    2. Prostate cancer  - Genetic Misc Sendout Test, Blood; Future    3. Family history of pancreatic cancer  - Genetic Misc Sendout Test, Blood; Future    4. Family history of colon cancer  - Genetic Misc Sendout Test, Blood; Future    5. Family history of prostate cancer  - Genetic Misc Sendout Test, Blood; Future      Follow-up:  Follow up for results review.     Approximately 40 minutes were spent face-to-face with the patient.  Approximately 60 minutes in total were spent on this encounter, which includes face-to-face time and non-face-to-face time preparing to see the patient (e.g., review of tests), obtaining and/or reviewing separately obtained history, documenting clinical information in the electronic or other health record, independently interpreting results (not separately reported) and communicating results to the patient/family/caregiver, or care coordination (not separately reported).     REFERENCES     National Comprehensive Cancer Network (NCCN). (2021). Genetic/familial high-risk assessment: Breast, ovarian, and pancreatic. NCCN Clinical Practice Guidelines in Oncology (NCCN Guidelines), Version 1.2022.  National Comprehensive Cancer Network (NCCN). (2021). Genetic/familial high-risk assessment: Colorectal. NCCN Clinical Practice Guidelines in Oncology (NCCN Guidelines), Version 1.2021.  National Comprehensive Cancer Network (NCCN). (2021). Prostate cancer. NCCN Clinical Practice Guidelines in Oncology (NCCN Guidelines), Version 1.2022.      Charu Mccall PA-C, MPAS, PA-C  Physician Assistant, [facility] Clinic  Hematology/Oncology, [facility]  [place]

## 2023-04-11 NOTE — PROGRESS NOTES
Radiation Oncology Follow-up Note                                                                                                                                 Date of Service: 04/11/2023     Chief Complaint: prostate cancer     Reason for visit: consideration for radiation to the pelvis, follow-up x 1     Referring Physician: self referred (last saw Dr Zane Oreilly, [facility], [place])     Implantable devices: denies     Therapy to Date:  No radiation     Diagnosis/Assessment:   Jose Fischer is a 62 y.o. man with unfavorable intermediate risk prostate adenocarcinoma, Stage IIC (cT1c, cN0, cM0, PSA: 17.7, Grade Group: 3) s/p TRUS prostate biopsy (Dr Zane Oreilly, [facility], [place] on 2/14/2023) with 6/12 standard cores involved (left only) GS 9 =5+4 (GG5), of note pathology was re-read by Dr Garza at [facility] and downgraded to GS 7 = 4+3 (GG3); PSMA negative for regional and distant mets     PMH of vasectomy and ED (on Viagra)     MSK nomogram risk EPE, SVI, LNI (%,%,%): 68,22,20     ECOG 0     Plan   We discussed treatment options per NCCN guidelines v2023:  - EBRT + short-term ADT  - RP + PLND     EBRT would consist of daily radiation treatments M-F, 70Gy in 28 fractions to the prostate gland + regional pelvic lymph nodes.      Risks, benefits, and side effects of radiotherapy were discussed.   Side effects include but are not limited to fatigue, erythema, hyperpigmentation, desquamation within the radiation field, more frequent urination, both during the day and at night, urgency with urination, hematuria, dysuria, and a sensation of incomplete emptying.   In addition, the patient will be at risk for increased frequency and/or loose bowel movements.   All of these side effects will go away in the short term.   In the long term, the patient is at risk for radiation cystitis, radiation proctitis, and erectile dysfunction.     The patient would like RT to manage his prostate cancer.    His urinary  issues are expected to improve on ADT, as I believe they are cancer related     - last colonoscopy 1 year ago at [facility]  - Epic- 26 survey filled on line  - lupron per Dr Elizabeth/Patricia Willson NP  - start radiation 2 months after lupron: patient would like to start RT some day in July and will confirm with us later        Interval history     3/14/2023 initial [facility] visit: pt believe to have very high risk prostate cancer,  agree with PSMA to get more accurate hosea and metastatic staging    3/14/2023 visit with Dr Elizabeth    3/31/2023 PSMA   Focal prostatic radiotracer avid lesion SUVm 22.8   No evidence of regional or distant radiotracer avid metastatic disease.    4/4/2023 last visit with Dr Gardner      Subjective:   In clinic the patient is accompanied by Karla     The patient reports feeling OK overall  He denies major bowel or sexual function issues (on Viagra with good response)  He reports significant LUTS (see AUA), no dysuria  Not on Flomax  No issues with NSAIDs  Has regular BMs qAM  He denies other major complaints     AUA score 25 (2,4,4,4,4,2,5) mixed  AMALIA score 25 (5,5,5,5,5) no ED     He denies any history of radiation therapy, implantable cardiac devices, or connective tissue disease.     Social history     (criminal prosecutor)  From [place]     Family history  Father CRC and pancreatic cancer      Review of patient's allergies indicates:  No Known Allergies    Current Outpatient Medications on File Prior to Visit   Medication Sig Dispense Refill    sildenafiL (VIAGRA) 50 MG tablet Take 50 mg by mouth daily as needed for Erectile Dysfunction.       No current facility-administered medications on file prior to visit.            Past Medical History:   Diagnosis Date    Depression      Prostate cancer                    Past Surgical History:   Procedure Laterality Date    PROSTATE BIOPSY   02/14/2023             Review of Systems   Constitutional:  Negative for fatigue, fever and  unexpected weight change.   HENT:  Negative for hearing loss and sinus pressure/congestion.    Eyes:  Negative for visual disturbance.   Respiratory:  Negative for cough, shortness of breath and wheezing.    Cardiovascular:  Negative for chest pain, palpitations and leg swelling.   Gastrointestinal:  Negative for abdominal pain, blood in stool, constipation, diarrhea, nausea, vomiting and reflux.   Genitourinary:  Negative for dysuria, frequency, hematuria and urgency.   Musculoskeletal:  Negative for arthralgias, back pain and neck pain.   Integumentary:  Negative for rash.   Neurological:  Negative for dizziness, seizures, speech difficulty, weakness, numbness, headaches and memory loss.   Psychiatric/Behavioral:  Negative for dysphoric mood. The patient is not nervous/anxious.          HPI:   Jose Fischer is a 62 y.o. man with recent diagnosis of prostate cancer after presenting with elevated PSA.     Oncologic history:     9/30/2016 TRUS prostate biopsy (Dr Samaniego) benign     11/21/2022 PSA 17.72     2/14/2023 TRUS prostate biopsy (Dr Zane Oreilly, [facility] [place])              6/12 standard cores involved (left only) GS 9 =5+4 (GG5)     2/28/2023 NM bone scan (outside)  No evidence of mets     2/28/2023 CT A/P              No mets     Objective:      Physical Exam  Vitals reviewed.     Constitutional:       Appearance: Normal appearance.   HENT:      Head: Normocephalic and atraumatic.   Eyes:      Conjunctiva/sclera: Conjunctivae normal.   Pulmonary:      Effort: Pulmonary effort is normal.   Abdominal:      General: There is no distension.   Genitourinary:     Comments: GAURANG deferred  Musculoskeletal:         General: Normal range of motion.  Neurological:      General: No focal deficit present.      Mental Status: Alert and oriented  Psychiatric:         Mood and Affect: Mood normal.         Behavior: Behavior normal.      Imaging: I have personally reviewed the patient's available images and  reports and summarized pertinent findings above in HPI.      Pathology: I have personally reviewed the patient's available pathology and summarized pertinent findings above in HPI.        I spent approximately 30 minutes reviewing the available records and evaluating the patient, out of which over 50% of the time was spent face to face with the patient in counseling and coordinating this patient's care.     Thank you for the opportunity to care for this patient. Please do not hesitate to contact me with any questions.     Ramiro Lake MD/PhD

## 2023-04-12 ENCOUNTER — PATIENT MESSAGE (OUTPATIENT)
Dept: RADIATION ONCOLOGY | Facility: CLINIC | Age: 63
End: 2023-04-12
Payer: COMMERCIAL

## 2023-04-12 ENCOUNTER — PATIENT MESSAGE (OUTPATIENT)
Dept: HEMATOLOGY/ONCOLOGY | Facility: CLINIC | Age: 63
End: 2023-04-12
Payer: COMMERCIAL

## 2023-04-12 ENCOUNTER — TELEPHONE (OUTPATIENT)
Dept: HEMATOLOGY/ONCOLOGY | Facility: CLINIC | Age: 63
End: 2023-04-12
Payer: COMMERCIAL

## 2023-04-12 ENCOUNTER — PATIENT MESSAGE (OUTPATIENT)
Dept: UROLOGY | Facility: CLINIC | Age: 63
End: 2023-04-12
Payer: COMMERCIAL

## 2023-04-12 DIAGNOSIS — C61 PROSTATE CANCER: Primary | ICD-10-CM

## 2023-04-12 NOTE — TELEPHONE ENCOUNTER
----- Message from Sheila Hale sent at 4/12/2023 10:33 AM CDT -----  Regarding: CBC,CMP,PSA  Can I please get labs linked to patients lab appt on  4/28    Thanks

## 2023-04-12 NOTE — TELEPHONE ENCOUNTER
----- Message from Ramiro Lake MD sent at 4/11/2023  6:35 PM CDT -----  Regarding: RE: juan pablo  Thanks Patricia!    ----- Message -----  From: Patricia Willson NP  Sent: 4/11/2023   4:53 PM CDT  To: Ronald Elizabeth MD, Ramiro Lake MD, #  Subject: RE: lupron                                       I cant get him in as soon as I have authorization. Ill place the orders tomorrow and will him in asap.  ----- Message -----  From: Ramiro Lake MD  Sent: 4/11/2023   4:26 PM CDT  To: Ronald Elizabeth MD, Patricia Willson NP, #  Subject: juan pablo Gomez and Ronald this patient would like ADT + radiation for his unfavorable intermediate risk prostate cancer. Of note his pathology was ready at [facility] and instead of very high risk he is unfavorable intermediate risk  He is anxious to start now   Do you know how soon we could get him in for lupron?  Thanks  Ramiro

## 2023-04-14 ENCOUNTER — PATIENT MESSAGE (OUTPATIENT)
Dept: HEMATOLOGY/ONCOLOGY | Facility: CLINIC | Age: 63
End: 2023-04-14
Payer: COMMERCIAL

## 2023-04-17 ENCOUNTER — PATIENT MESSAGE (OUTPATIENT)
Dept: RADIATION ONCOLOGY | Facility: CLINIC | Age: 63
End: 2023-04-17
Payer: COMMERCIAL

## 2023-04-25 LAB
GENETIC COUNSELING?: YES
GENSO SPECIMEN TYPE: NORMAL
MISCELLANEOUS GENETIC TEST NAME: NORMAL
PARTENTAL OR SIBLING TESTING?: NO
REFERENCE LAB: NORMAL
TEST RESULT: NORMAL

## 2023-04-28 ENCOUNTER — LAB VISIT (OUTPATIENT)
Dept: LAB | Facility: HOSPITAL | Age: 63
End: 2023-04-28
Payer: COMMERCIAL

## 2023-04-28 ENCOUNTER — OFFICE VISIT (OUTPATIENT)
Dept: HEMATOLOGY/ONCOLOGY | Facility: CLINIC | Age: 63
End: 2023-04-28
Payer: COMMERCIAL

## 2023-04-28 ENCOUNTER — INFUSION (OUTPATIENT)
Dept: INFUSION THERAPY | Facility: HOSPITAL | Age: 63
End: 2023-04-28
Payer: COMMERCIAL

## 2023-04-28 VITALS
OXYGEN SATURATION: 98 % | TEMPERATURE: 98 F | SYSTOLIC BLOOD PRESSURE: 102 MMHG | WEIGHT: 157.63 LBS | RESPIRATION RATE: 18 BRPM | DIASTOLIC BLOOD PRESSURE: 62 MMHG | HEART RATE: 57 BPM | HEIGHT: 72 IN | BODY MASS INDEX: 21.35 KG/M2

## 2023-04-28 DIAGNOSIS — C61 PROSTATE CANCER: Primary | ICD-10-CM

## 2023-04-28 DIAGNOSIS — Z79.818 ENCOUNTER FOR MONITORING ANDROGEN DEPRIVATION THERAPY: ICD-10-CM

## 2023-04-28 DIAGNOSIS — C61 PROSTATE CANCER: ICD-10-CM

## 2023-04-28 DIAGNOSIS — Z80.42 FAMILY HISTORY OF PROSTATE CANCER: ICD-10-CM

## 2023-04-28 LAB
ALBUMIN SERPL BCP-MCNC: 3.8 G/DL (ref 3.5–5.2)
ALP SERPL-CCNC: 59 U/L (ref 55–135)
ALT SERPL W/O P-5'-P-CCNC: 19 U/L (ref 10–44)
ANION GAP SERPL CALC-SCNC: 8 MMOL/L (ref 8–16)
AST SERPL-CCNC: 20 U/L (ref 10–40)
BILIRUB SERPL-MCNC: 0.2 MG/DL (ref 0.1–1)
BUN SERPL-MCNC: 19 MG/DL (ref 8–23)
CALCIUM SERPL-MCNC: 9.5 MG/DL (ref 8.7–10.5)
CHLORIDE SERPL-SCNC: 105 MMOL/L (ref 95–110)
CO2 SERPL-SCNC: 28 MMOL/L (ref 23–29)
COMPLEXED PSA SERPL-MCNC: 18.8 NG/ML (ref 0–4)
CREAT SERPL-MCNC: 0.9 MG/DL (ref 0.5–1.4)
ERYTHROCYTE [DISTWIDTH] IN BLOOD BY AUTOMATED COUNT: 12.6 % (ref 11.5–14.5)
EST. GFR  (NO RACE VARIABLE): >60 ML/MIN/1.73 M^2
GLUCOSE SERPL-MCNC: 98 MG/DL (ref 70–110)
HCT VFR BLD AUTO: 41 % (ref 40–54)
HGB BLD-MCNC: 13.2 G/DL (ref 14–18)
IMM GRANULOCYTES # BLD AUTO: 0.01 K/UL (ref 0–0.04)
MCH RBC QN AUTO: 28.5 PG (ref 27–31)
MCHC RBC AUTO-ENTMCNC: 32.2 G/DL (ref 32–36)
MCV RBC AUTO: 89 FL (ref 82–98)
NEUTROPHILS # BLD AUTO: 3.1 K/UL (ref 1.8–7.7)
PLATELET # BLD AUTO: 209 K/UL (ref 150–450)
PMV BLD AUTO: 9.9 FL (ref 9.2–12.9)
POTASSIUM SERPL-SCNC: 4.9 MMOL/L (ref 3.5–5.1)
PROT SERPL-MCNC: 6.3 G/DL (ref 6–8.4)
RBC # BLD AUTO: 4.63 M/UL (ref 4.6–6.2)
SODIUM SERPL-SCNC: 141 MMOL/L (ref 136–145)
WBC # BLD AUTO: 5.71 K/UL (ref 3.9–12.7)

## 2023-04-28 PROCEDURE — 3078F DIAST BP <80 MM HG: CPT | Mod: CPTII,S$GLB,, | Performed by: NURSE PRACTITIONER

## 2023-04-28 PROCEDURE — 96402 CHEMO HORMON ANTINEOPL SQ/IM: CPT

## 2023-04-28 PROCEDURE — 85027 COMPLETE CBC AUTOMATED: CPT | Performed by: NURSE PRACTITIONER

## 2023-04-28 PROCEDURE — 63600175 PHARM REV CODE 636 W HCPCS: Mod: JZ,JG | Performed by: NURSE PRACTITIONER

## 2023-04-28 PROCEDURE — 1159F MED LIST DOCD IN RCRD: CPT | Mod: CPTII,S$GLB,, | Performed by: NURSE PRACTITIONER

## 2023-04-28 PROCEDURE — 84153 ASSAY OF PSA TOTAL: CPT | Performed by: NURSE PRACTITIONER

## 2023-04-28 PROCEDURE — 3008F BODY MASS INDEX DOCD: CPT | Mod: CPTII,S$GLB,, | Performed by: NURSE PRACTITIONER

## 2023-04-28 PROCEDURE — 3074F SYST BP LT 130 MM HG: CPT | Mod: CPTII,S$GLB,, | Performed by: NURSE PRACTITIONER

## 2023-04-28 PROCEDURE — 3074F PR MOST RECENT SYSTOLIC BLOOD PRESSURE < 130 MM HG: ICD-10-PCS | Mod: CPTII,S$GLB,, | Performed by: NURSE PRACTITIONER

## 2023-04-28 PROCEDURE — 99215 OFFICE O/P EST HI 40 MIN: CPT | Mod: S$GLB,,, | Performed by: NURSE PRACTITIONER

## 2023-04-28 PROCEDURE — 3078F PR MOST RECENT DIASTOLIC BLOOD PRESSURE < 80 MM HG: ICD-10-PCS | Mod: CPTII,S$GLB,, | Performed by: NURSE PRACTITIONER

## 2023-04-28 PROCEDURE — 99999 PR PBB SHADOW E&M-EST. PATIENT-LVL III: ICD-10-PCS | Mod: PBBFAC,,, | Performed by: NURSE PRACTITIONER

## 2023-04-28 PROCEDURE — 80053 COMPREHEN METABOLIC PANEL: CPT | Performed by: NURSE PRACTITIONER

## 2023-04-28 PROCEDURE — 99999 PR PBB SHADOW E&M-EST. PATIENT-LVL III: CPT | Mod: PBBFAC,,, | Performed by: NURSE PRACTITIONER

## 2023-04-28 PROCEDURE — 99215 PR OFFICE/OUTPT VISIT, EST, LEVL V, 40-54 MIN: ICD-10-PCS | Mod: S$GLB,,, | Performed by: NURSE PRACTITIONER

## 2023-04-28 PROCEDURE — 3008F PR BODY MASS INDEX (BMI) DOCUMENTED: ICD-10-PCS | Mod: CPTII,S$GLB,, | Performed by: NURSE PRACTITIONER

## 2023-04-28 PROCEDURE — 36415 COLL VENOUS BLD VENIPUNCTURE: CPT | Performed by: NURSE PRACTITIONER

## 2023-04-28 PROCEDURE — 1159F PR MEDICATION LIST DOCUMENTED IN MEDICAL RECORD: ICD-10-PCS | Mod: CPTII,S$GLB,, | Performed by: NURSE PRACTITIONER

## 2023-04-28 RX ADMIN — LEUPROLIDE ACETATE 22.5 MG: KIT at 02:04

## 2023-04-28 NOTE — PROGRESS NOTES
[facility]      Subjective:       Patient ID: Jose Fischer    Chief Complaint:     HPI    Jose Fischer is a 63 y.o. male, patient of Dr. Elizabeth,, for follow up and to begin ADT. He has previously met with Karen Gardner, Clara and Jaya. He has decided to pursue concurrent XRT and ADT. He has a primary care doctor who he sees regularly in [place]. Going to make an appointment with him for next week. Reports he has had a lipid profile and A1c within the last year.    Presents with his wife, Karla. ECOG 0.    Initial visit surveys:  AUA score 23 (3,5,3,4,2,1,5) mostly satisfied  AMALIA score 25 (5,5,5,5,5) no ED    Oncologic History:  9/30/2016 TRUS prostate biopsy (Dr Samaniego) benign     11/21/2022 PSA 17.72     2/14/2023 TRUS prostate biopsy (Dr Zane Oreilly, [facility], [place])              6/12 standard corse involved (left only) GS 9 =5+4 (GG5)     2/28/2023 NM bone scan (outside)  No evidence of mets     2/28/2023 CT A/P              No mets    3/14/23- Negative PSMA scan except for known malignancy.    3/24/23 Reread of his pathology at [facility]:  Final Pathologic Diagnosis BIOPSIES OF RIGHT BASE, RIGHT MID, AND RIGHT APEX OF THE PROSTATE:   NO CARCINOMA IDENTIFIED   BIOPSIES OF LEFT BASE, LEFT MID, LEFT APEX OF THE PROSTATE:   ADENOCARCINOMA TOTAL LITO SCORE 7 (4+3)--GRADE GROUP 3     Oncology History   Prostate cancer   2/14/2023 Cancer Staged    Staging form: Prostate, AJCC 8th Edition  - Clinical stage from 2/14/2023: Stage IIC (cT1c, cN0, cM0, PSA: 17.7, Grade Group: 3)       3/14/2023 Initial Diagnosis    Prostate cancer       4/11/2023 Genetic Testing    Results pending       Review of Systems   Constitutional:  Negative for activity change, appetite change, fatigue, fever and unexpected weight change.   HENT:  Negative for mouth sores, nosebleeds and trouble swallowing.    Eyes:  Negative for discharge and visual disturbance.   Respiratory:  Negative for cough,  shortness of breath and wheezing.    Cardiovascular:  Negative for chest pain and leg swelling.   Gastrointestinal:  Negative for abdominal distention, abdominal pain, blood in stool, constipation, diarrhea, nausea and vomiting.   Genitourinary:  Negative for decreased urine volume, difficulty urinating, frequency and hematuria.   Musculoskeletal:  Negative for back pain.   Skin:  Negative for color change and rash.   Neurological:  Negative for weakness and headaches.   Hematological:  Negative for adenopathy.   Psychiatric/Behavioral:  Negative for sleep disturbance. The patient is not nervous/anxious.    All other systems reviewed and are negative.    Allergies:  Review of patient's allergies indicates:  No Known Allergies    Medications:  Current Outpatient Medications   Medication Sig Dispense Refill    sildenafiL (VIAGRA) 50 MG tablet Take 50 mg by mouth daily as needed for Erectile Dysfunction.       No current facility-administered medications for this visit.       PMH:  Past Medical History:   Diagnosis Date    Depression     Prostate cancer        PSH:  Past Surgical History:   Procedure Laterality Date    PROSTATE BIOPSY  02/14/2023       FamHx:  Family History   Problem Relation Age of Onset    Pancreatic cancer Father 53        remote hx of smoking and alcohol    Colon cancer Father 27    Breast cancer Maternal Aunt 70        ER+    Prostate cancer Paternal Uncle 65        Mets to bone    Lung cancer Paternal Uncle         small cell, +smoker    Breast cancer Other        SocHx:  Social History     Socioeconomic History    Marital status: Single   Tobacco Use    Smoking status: Former    Smokeless tobacco: Never   Substance and Sexual Activity    Alcohol use: No    Drug use: No    Sexual activity: Not Currently     Partners: Female       Distress Score             Practical Problems Physical Problems                                                   Family Problems                                          Emotional Problems                                                         Spiritual/Religions Concerns               Other Problems                Objective:        Vitals:    04/28/23 1304   BP: 102/62   Pulse: (!) 57   Resp: 18   Temp: 97.9 °F (36.6 °C)       Physical Exam  Vitals reviewed.   Constitutional:       Appearance: Normal appearance. He is normal weight.   Eyes:      Extraocular Movements: Extraocular movements intact.      Conjunctiva/sclera: Conjunctivae normal.      Pupils: Pupils are equal, round, and reactive to light.   Pulmonary:      Effort: Pulmonary effort is normal.   Neurological:      Mental Status: He is alert and oriented to person, place, and time.   Psychiatric:         Mood and Affect: Mood normal.         Behavior: Behavior normal.         Thought Content: Thought content normal.         Judgment: Judgment normal.               LABS:  WBC   Date Value Ref Range Status   04/28/2023 5.71 3.90 - 12.70 K/uL Final     Hemoglobin   Date Value Ref Range Status   04/28/2023 13.2 (L) 14.0 - 18.0 g/dL Final     Hematocrit   Date Value Ref Range Status   04/28/2023 41.0 40.0 - 54.0 % Final     Platelets   Date Value Ref Range Status   04/28/2023 209 150 - 450 K/uL Final       Chemistry        Component Value Date/Time     04/28/2023 1151    K 4.9 04/28/2023 1151     04/28/2023 1151    CO2 28 04/28/2023 1151    BUN 19 04/28/2023 1151    CREATININE 0.9 04/28/2023 1151    GLU 98 04/28/2023 1151        Component Value Date/Time    CALCIUM 9.5 04/28/2023 1151    ALKPHOS 59 04/28/2023 1151    AST 20 04/28/2023 1151    ALT 19 04/28/2023 1151    BILITOT 0.2 04/28/2023 1151          No results found for: [id number]      Lab Results   Component Value Date    PSADIAG 18.8 (H) 04/28/2023    PSADIAG 22.1 (H) 04/04/2023        IMAGING:  NM PET CT F 18 PYL PSMA, Midthigh to Vertex  Narrative: EXAMINATION:  NM PET CT F 18 PYL PSMA, MIDTHIGH TO VERTEX    CLINICAL HISTORY:  Malignant neoplasm of  prostate    TECHNIQUE:  Approximately 60 minutes following the intravenous injection of 10.273 mCi F-18 piflufolastat, PET images were acquired from the proximal thighs to the skull vertex. CT images were also acquired for attenuation correction and anatomic localization and performed without oral or IV contrast.    COMPARISON:  Bone scan 02/28/2023    CT abdomen pelvis 02/28/2023    FINDINGS:  In the head and neck, there is physiologic uptake in the lacrimal and salivary glands, and there are no tracer avid lesions suspicious for malignancy.    In the chest, there are no tracer avid lesions suspicious for malignancy.    In the abdomen and pelvis, there is physiologic distribution in the liver, spleen, bowel, and genitourinary tract.  Focal uptake about the midportion of the prostate with a maximum SUV of 22.8.  No surrounding tracer avid lymph nodes.  Otherwise no tracer avid lesions suspicious for malignancy.    There is nonspecific uptake in the inguinal lymph nodes.    In the bones, there is physiologic uptake in the bone marrow, and there are no tracer avid lesions suspicious for malignancy.    Additional CT findings:    Moderate aortic atherosclerotic calcifications.  Prostatic calcification.  Mild bilateral gynecomastia.  Impression: Focal prostatic radiotracer avid lesion correlating with known malignancy.    No evidence of regional or distant radiotracer avid metastatic disease.    I, Sourav Vargas MD, attest that I reviewed and interpreted the images.    Electronically signed by resident: Cortes Sen  Date:    03/31/2023  Time:    15:23    Electronically signed by: Sourav Vargas  Date:    03/31/2023  Time:    15:42      Cholesterol Monitoring: Patient will send last lipids.  No results found for: CHOL  No results found for: HDL  No results found for: LDLCALC  No results found for: DLDL  No results found for: TRIG    ASCVD Risk    The ASCVD Risk score (Boris BASSETT, et al., 2019) failed to calculate for the  following reasons:    Cannot find a previous HDL lab    Cannot find a previous total cholesterol lab    Body mass index is 21.38 kg/m².      Diabetes Monitoring:  No results found for: LABA1C, HGBA1C    Bone Health: No vit D.    Assessment:       1. Prostate cancer    2. Encounter for monitoring androgen deprivation therapy    3. Family history of prostate cancer          Plan:      1,2- Cancer Staging   Prostate cancer  Staging form: Prostate, AJCC 8th Edition  - Clinical stage from 2/14/2023: Stage IIC (cT2b, cN0, cM0, PSA: 17.7, Grade Group: 3) - Signed by Patricia Willson NP on 4/28/2023      Jose Fischer is a 63 y.o. male, patient of Dr. Elizabeth, seen today in [facility] to discuss treatment for his recent diagnosis of prostate cancer. Long conversation with patient regarding prostate cancer disease, natural history of the disease, treatment options per NCCN guidelines. Patient has decided to pursue XRT and ADT. Discussed he will receive ADT for 6 months and will do PSA surveillance every 6 months for 5 years following definitive treatment and then yearly to monitor for recurrence.     Patient Education:  Since testosterone serves as the main fuel for prostate cancer cell growth, its a common target for treatment. Hormone therapy, also known as androgen-deprivation therapy or ADT, is designed to stop testosterone from being released or to prevent it from acting on the prostate cells.     For intermediate-risk prostate cancer, hormone therapy usually consists of a shot that lowers your testosterone, given every 3 months for 6 months. Clinical trials show a benefit in patients who receive hormonal treatment with external beam radiation.    Patient educated and provided handouts on Eligard and associated side effects. Side effects are similar for most types of hormone therapy, but they can vary a bit between medicines. An extensive discussion of possible side effects can include:  Sudden  increase in body heat (hot flashes)  Tiredness  Less interest in sex  Inability to get or keep an erection  Decrease in size of penis and testicles  Changes in facial hair  Mood changes, such as depression, irritability, or anxiety  Trouble with memory and concentration  Loss of muscle  Diarrhea  Nausea  Weight gain  Breast-area tenderness or growth  Low red blood cell count (anemia)  Hair loss  Bone thinning (osteoporosis)  Increased risk of heart disease, stroke, and diabetes      Patient was educated on when to call (and given the numbers to call and knows to message via [facility] if possible) or notify the provider including, but not limited to:   Intolerable side effects  Severe pain or new onset pain not controlled by current medication regimen    Patient was provided with additional resources that [facility] offers including, but not limited to: financial counseling, , psychologist, palliative care, support groups, transportation, dietician, rehab services, and urgent care visits within the oncology department. Patient has an established PCP and will notify PCP regarding starting hormone therapy for prostate cancer. The patient agrees with the plan, and all questions have been answered to their satisfaction.     More than 45 mins were spent during this encounter, greater than 50% was spent in direct counseling and/or coordination of care.       Patient is in agreement with the proposed treatment plan. All questions were answered to the patient's satisfaction. Pt knows to call clinic if anything is needed before the next clinic visit.    Patricia Willson, MSN, APRN, FNP-C  Hematology and Medical Oncology  Nurse Practitioner to Dr. Ronald Elizabeth  Nurse Practitioner, [facility]

## 2023-04-28 NOTE — NURSING
Pt here for Lupron injection. Administered Lupron in right ventrogluteal muscle. No questions or concerns. Pt ambulated out of unit unassisted.

## 2023-05-01 ENCOUNTER — PATIENT MESSAGE (OUTPATIENT)
Dept: HEMATOLOGY/ONCOLOGY | Facility: CLINIC | Age: 63
End: 2023-05-01
Payer: COMMERCIAL

## 2023-05-04 ENCOUNTER — DOCUMENTATION ONLY (OUTPATIENT)
Dept: HEMATOLOGY/ONCOLOGY | Facility: CLINIC | Age: 63
End: 2023-05-04
Payer: COMMERCIAL

## 2023-05-04 NOTE — PROGRESS NOTES
"Hereditary and High Risk Clinic  Department of Hematology and Oncology  [facility]    Cancer Genetics  Results Disclosure    Name: Jose Fischer ("Jose")  MRN: [id number]    GERMLINE GENETIC TESTING       RESULT: Negative  Pathogenic (harmful) variants:  The test did not identify any genetic variants known to cause cancer.     Variants of uncertain significance:   The test did not identify any variants of uncertain significance.         Personal and Family History:   Jose has a personal history of:   High risk prostate cancer, [place] 9, age 62    Jose's family history is significant for the following cancers:   Father: Colon 27, pancreatic 53  Pat uncle: Lung 60  Pat uncle: Prostate 65  Mat aunt: Breast 70  MGGM: Breast  Sister Rosy: Negative genetic testing ([facility] 4/2022)    Interpretation of Results:   Jose tested negative for pathogenic mutations in the genes commonly associated with hereditary prostate, colon, pancreatic, breast, lung and other cancers.     In regards to his personal history of cancer, there are two possible explanations:   Jose has a sporadic prostate cancer.   Jose has a hereditary prostate cancer caused by a mutation in a gene that is not currently tested or is not detectable using current testing technology.     In regards to his family history of cancer, there are a few possible explanations:   The relative(s) had sporadic (random) cancer(s).   The relative(s) had hereditary cancer(s) caused by a genetic mutation Jose did not inherit.   The relative(s) had hereditary cancer(s) caused by a genetic mutation in a gene that is not currently tested or is not detectable using current testing technology.     Jose's father's early-onset colon and pancreatic cancer is suspicious for a hereditary predisposition, which means he could have had a mutation Jose and his sister Rosy did not inherit. However, the paternal family history does not quite fit a hereditary cancer " syndrome, so very possible he had two random cancers.    The most informative individuals to have genetic testing are those who had cancer, but his father and paternal uncles are . Jose's sister Nirmala may want to consider genetic testing. If she also tests negative, that would reduce, but not eliminate, the possibility that their father had a mutation. She would at least know that she isn't high risk for pancreatic cancer.     Anyone interested in pursuing genetic counseling/testing can contact the [facility] to schedule an appointment with a genetics provider by calling [phone]. In-person visits are available in [facility]. Virtual visits are available for individuals located in [place], [place] and other select [facility]. Virtual patients must be able to access the virtual visit through the MyChart ([facility]) portal. Anyone who is unable to see an [facility] provider or prefers an in-person visit with someone closer to home can find a genetic counselor in their area by visiting the website for the National Society of Genetic Counselors (www.NSGC.org) and clicking Find a Genetic Counselor.     Implications for Jose:   No further testing is indicated at this time, but may be indicated in the future if new prostate, pancreatic or colon cancer genes or new technologies are discovered.   Cancer is usually caused by multiple risk factors, some of which are inherited. Even though Jose's genetic testing was negative, he may still be at increased risk for the cancers in his family compared to someone who does not have a family history of these cancers.   Per current guidelines, anyone with colon cancer in a first-degree relative (parent, sibling, child) should undergo a colonoscopy every 5 years. Jose's last colonoscopy in  was normal, so the next is due in .  Jose does not meet criteria for pancreatic cancer screening, but should take steps to reduce his  risk, including avoiding smoking, limiting alcohol consumption, exercising, maintaining a healthy weight, and eating a diet high in fruits, vegetables and whole grains and low in fats and red meat.     Implications for Jose's relatives:   Jose's siblings should also undergo colonoscopy every 5 years and take steps to reduce their risk for pancreatic cancer.   Jose's sister Nirmala should consider genetic testing.   Mutations don't skip generations, so even if Jose's father had a mutation, Jose's children could not have the mutation.   Unless the mother of Jose's children had colon cancer, they are at average risk for colon cancer and would follow standard screening guidelines (colonoscopy every 10 years beginning at age 45).      GODWIN Beck, PA-C  Hereditary/High Risk Clinic  Department of Hematology/Oncology  [facility]      CC: Ronald Elizabeth MD

## 2023-05-04 NOTE — LETTER
May 4, 2023    Jose Fischer  [address] [place] [address] [place] [id number]     Dear Jose,    Below are the results from your recent cancer genetic testing. Please review and let us know if you have any questions or concerns. If you would like to schedule an in-person or a virtual appointment with me to further discuss the results, please message me through your [facility] patient portal or call [phone] to request a post-test genetic counseling appointment.    Dr. Elizabeth has been notified of your results.  A copy of the [employer] [facility] results report is available to you in your [facility] medical record and the [facility] patient portal. If you have any difficulty accessing your report, please let our office know so we can mail you a hard copy.    Germline Genetic Testing  The [facility] [employer] panel analyzes 91 genes associated with hereditary cancer: AIP, ALK, APC, ROLLY, BAP1, BARD1, BLM, BMPR1A, BRCA1, BRCA2, BRIP1, CDC73, CDH1, CDK4, CDKN1B, CDKN2A, CHEK2, DICER1, FANCC, FH, FLCN, GALNT12, KIF1B, LZTR1, MAX, MEN1, MET, MLH1, MRE11A, MSH2, MSH6, MUTYH, NBN, NF1, NF2, NTHL1, PALB2, PHOX2B, PMS2, POT1, QTFRD4E, PTCH1, PTEN, RAD50, RAD51C, RAD51D, RB1, RECQL, RET, SDHA, SDHAF2, SDHB, SDHC, SDHD, SMAD4, SMARCA4, SMARCB1, SMARCE1, STK11, SUFU, KOIS240, TP53, TSC1, TSC2, VHL and XRCC2 (sequencing and deletion/duplication); AXIN2, CASR, CFTR, CPA1, CTNNA1, CTRC, EGFR, EGLN1, VYY770M, HOXB13, KIT, MITF, MLH3, MSH3, PALLD, PDGFRA, POLD1, POLE, PRSS1, RINT1, RPS20, SPINK1 and TERT (sequencing only); EPCAM and GREM1 (deletion/duplication only). RNA data is routinely analyzed for use in variant interpretation for all genes.     RESULT: Negative  Pathogenic (harmful) variants:  The test did not identify any genetic variants known to cause cancer.     Variants of uncertain significance:   The test did not identify any variants of uncertain significance.         What do these results mean?  You tested negative for pathogenic  mutations in the genes commonly associated with hereditary prostate, colon, pancreatic, breast, lung and other cancers.     In regards to your personal history of cancer, there are two possible explanations:   You have a sporadic prostate cancer.   You have a hereditary prostate cancer caused by a mutation in a gene that is not currently tested or is not detectable using current testing technology.     In regards to your family history of cancer, there are a few possible explanations:   The relative(s) had sporadic (random) cancer(s).   The relative(s) had hereditary cancer(s) caused by a genetic mutation you did not inherit.   The relative(s) had hereditary cancer(s) caused by a genetic mutation in a gene that is not currently tested or is not detectable using current testing technology.     Your father's early-onset colon and pancreatic cancer is suspicious for a hereditary predisposition, which means he could have had a mutation you and your sister Rosy did not inherit. However, the paternal family history does not quite fit a hereditary cancer syndrome, so very possible he had two random cancers.     The most informative individuals to have genetic testing are those who had cancer, but your father and paternal uncles are . Your sister Nirmala may want to consider genetic testing. If she also tests negative, that would reduce, but not eliminate, the possibility that your father had a mutation. She would at least know that she isn't high risk for pancreatic cancer.     Anyone interested in pursuing genetic counseling/testing can contact the [facility] to schedule an appointment with a genetics provider by calling [phone]. In-person visits are available in [facility], and [place]. Virtual visits are available for individuals located in [place], [place] and other select [facility]. Virtual patients must be able to access the virtual visit through the [employer] ([facility])  portal. Anyone who is unable to see an [facility] provider or prefers an in-person visit with someone closer to home can find a genetic counselor in their area by visiting the website for the National Society of Genetic Counselors (www.NSGC.org) and clicking Find a Genetic Counselor.     Implications for you:   No further testing is indicated at this time, but may be indicated in the future if new prostate, pancreatic or colon cancer genes or new technologies are discovered.   Cancer is usually caused by multiple risk factors, some of which are inherited. Even though your genetic testing was negative, you may still be at increased risk for the cancers in his family compared to someone who does not have a family history of these cancers.   Per current guidelines, anyone with colon cancer in a first-degree relative (parent, sibling, child) should undergo a colonoscopy every 5 years. Your last colonoscopy in 2021 was normal, so the next is due in 2026.  You do not meet criteria for pancreatic cancer screening, but should take steps to reduce his risk, including avoiding smoking, limiting alcohol consumption, exercising, maintaining a healthy weight, and eating a diet high in fruits, vegetables and whole grains and low in fats and red meat.     Implications for your relatives:   Your siblings should also undergo colonoscopy every 5 years and take steps to reduce their risk for pancreatic cancer.   Nirmala should consider genetic testing.   Mutations don't skip generations, so even if your father had a mutation, your children could not have the mutation.   Unless the mother of your children had colon cancer, they are at average risk for colon cancer and would follow standard screening guidelines (colonoscopy every 10 years beginning at age 45).    Cancer Genetics  Every cell in your body has a copy of your genes, which are segments of DNA that work as an instruction manual to tell the cells how to grow and divide. If a gene  has a mutation (abnormality) it could send the wrong instructions to the cells, causing them to grow and divide out of control and develop into cancer cells.     How does this happen?  Random DNA mistakes can occur as the cells multiply.   The DNA can be altered by carcinogens, such as the chemicals in tobacco smoke, UV rays from the sun, and certain infections like HPV (human papillomavirus).   Abnormal DNA can be inherited from our parents.     How are genetic mutations detected?  Germline testing: Tests the blood or saliva to detect inherited genetic mutations the person was born with. Every cell in the body (blood, saliva, skin, organs, eggs, sperm) will have the abnormal gene which can be passed on to the individual's children.    Somatic testing: Tests the cancer cells to detect acquired genetic mutations that occurred over the course of the individual's life. These abnormal genes are only present in the cancer cells and cannot be passed on to the individual's children.     Sporadic cancer  Approximately 80% of all cancers are sporadic or random. These cancers are caused by an accumulation of genetic mutations acquired over the course of an individual's lifetime. The risk for acquired mutations increases with age and can be influenced by environmental factors (chemical and radiation exposures), lifestyle factors (smoking, alcohol, obesity, lack of exercise, poor diet), and certain medical conditions (diabetes, liver disease, infections). Because cancer is common, some families will have more than one person with the same type of sporadic cancer. The most common cancers are breast, lung, colorectal and prostate. Most sporadic cancers occur after age 60.    Familial cancer  Familial cancer refers to a clustering of cancer in a family that is more than you would expect to see by chance, but it is not hereditary. While hereditary cancer is caused by an inherited mutation in a single gene, familial cancers are  caused by a combination of multiple factors, some of which may be inherited (like diabetes or fatty liver disease) and some related to shared lifestyle and environmental factors. Like sporadic cancer, familial cancers are usually diagnosed at older ages and don't follow the same inheritance patterns seen in hereditary cancers.     Hereditary cancer  Approximately 5-10% of all cancers are hereditary, meaning they are caused by an inherited mutation in a single gene, such as BRCA1. These mutations are passed directly from parent to child, with each child having a 50% chance of inheriting the mutation. Hereditary cancer is suspected in individuals with early-onset cancer (usually before age 50), rare cancers (such as male breast cancer), and/or multiple blood relatives in successive generations with the same or related cancers (for example breast, ovarian, prostate and pancreatic cancer, which are all associated with BRCA gene mutations). Germline testing will show that the relatives have the exact same genetic mutation that is known to cause cancer.      What you can do to reduce your risk for cancer:   Avoid tobacco use; exercise; maintain a healthy weight; eat a diet rich in fruits, vegetables, and whole grains and low in saturated/trans fat, red meat, and processed meat; limit alcohol consumption; protect against sexually transmitted infections; protect against sun exposure, avoid tanning beds; and get regular cancer screenings as recommended by your healthcare team.    Follow-up  Please update me through [facility] with any changes to your personal or family history and with any relative's genetic testing results. I'm happy to review their results to determine how they might affect you and other family members. Genetics is an evolving field, so I invite you to contact me periodically to determine if any updated genetic testing is recommended for you or your relatives.     Sincerely,  GODWIN Beck,  LARISSA  Physician Assistant, Hereditary/High Risk Clinic  [facility]    Phone:  [phone]

## 2023-05-12 ENCOUNTER — TELEPHONE (OUTPATIENT)
Dept: RADIATION ONCOLOGY | Facility: CLINIC | Age: 63
End: 2023-05-12
Payer: COMMERCIAL

## 2023-05-12 NOTE — TELEPHONE ENCOUNTER
Incoming call from patient inquiring about [facility] referral for radiation treatments beginning in July. Informed patient that nurse navigator will contact Eleanor Stauffer LCSW for assistance with lodging referral.

## 2023-05-29 ENCOUNTER — OFFICE VISIT (OUTPATIENT)
Dept: HEMATOLOGY/ONCOLOGY | Facility: CLINIC | Age: 63
End: 2023-05-29
Payer: COMMERCIAL

## 2023-05-29 DIAGNOSIS — C61 PROSTATE CANCER: Primary | ICD-10-CM

## 2023-05-29 DIAGNOSIS — Z79.818 ENCOUNTER FOR MONITORING ANDROGEN DEPRIVATION THERAPY: ICD-10-CM

## 2023-05-29 PROCEDURE — 99213 OFFICE O/P EST LOW 20 MIN: CPT | Mod: 95,,, | Performed by: NURSE PRACTITIONER

## 2023-05-29 PROCEDURE — 99213 PR OFFICE/OUTPT VISIT, EST, LEVL III, 20-29 MIN: ICD-10-PCS | Mod: 95,,, | Performed by: NURSE PRACTITIONER

## 2023-05-29 NOTE — PROGRESS NOTES
[facility]      Subjective:       Patient ID: Jose Fischer    Chief Complaint:     HPI    Jose Fischer is a 63 y.o. male, patient of Dr. Elizabeth,, for 1 month follow up. He has previously met with Karen Gardner, Clara and Jaya. He has decided to pursue concurrent XRT and ADT. Received ADT on 4/28/23. Patient exercising daily. No issues. No weight gain.     Presents with his wife, Karla. ECOG 0.    Initial visit surveys:  AUA score 23 (3,5,3,4,2,1,5) mostly satisfied  AMALIA score 25 (5,5,5,5,5) no ED    Oncologic History:  9/30/2016 TRUS prostate biopsy (Dr Samaniego) benign     11/21/2022 PSA 17.72     2/14/2023 TRUS prostate biopsy (Dr Zane Oreilly, [facility] [place])              6/12 standard corse involved (left only) GS 9 =5+4 (GG5)     2/28/2023 NM bone scan (outside)  No evidence of mets     2/28/2023 CT A/P              No mets    3/14/23- Negative PSMA scan except for known malignancy.    3/24/23 Reread of his pathology at [facility]:  Final Pathologic Diagnosis BIOPSIES OF RIGHT BASE, RIGHT MID, AND RIGHT APEX OF THE PROSTATE:   NO CARCINOMA IDENTIFIED   BIOPSIES OF LEFT BASE, LEFT MID, LEFT APEX OF THE PROSTATE:   ADENOCARCINOMA TOTAL LITO SCORE 7 (4+3)--GRADE GROUP 3     Oncology History   Prostate cancer   2/14/2023 Cancer Staged    Staging form: Prostate, AJCC 8th Edition  - Clinical stage from 2/14/2023: Stage IIC (cT2b, cN0, cM0, PSA: 17.7, Grade Group: 3)     3/14/2023 Initial Diagnosis    Prostate cancer     4/11/2023 Genetic Testing    Ambry 91-gene panel: Negative.        Review of Systems   Constitutional:  Negative for activity change, appetite change, fatigue, fever and unexpected weight change.   HENT:  Negative for mouth sores, nosebleeds and trouble swallowing.    Eyes:  Negative for discharge and visual disturbance.   Respiratory:  Negative for cough, shortness of breath and wheezing.    Cardiovascular:  Negative for chest pain and leg swelling.    Gastrointestinal:  Negative for abdominal distention, abdominal pain, blood in stool, constipation, diarrhea, nausea and vomiting.   Genitourinary:  Negative for decreased urine volume, difficulty urinating, frequency and hematuria.   Musculoskeletal:  Negative for back pain.   Skin:  Negative for color change and rash.   Neurological:  Negative for weakness and headaches.   Hematological:  Negative for adenopathy.   Psychiatric/Behavioral:  Negative for sleep disturbance. The patient is not nervous/anxious.    All other systems reviewed and are negative.    Allergies:  Review of patient's allergies indicates:  No Known Allergies    Medications:  Current Outpatient Medications   Medication Sig Dispense Refill    sildenafiL (VIAGRA) 50 MG tablet Take 50 mg by mouth daily as needed for Erectile Dysfunction.       No current facility-administered medications for this visit.       PMH:  Past Medical History:   Diagnosis Date    Depression     Prostate cancer        PSH:  Past Surgical History:   Procedure Laterality Date    PROSTATE BIOPSY  02/14/2023       FamHx:  Family History   Problem Relation Age of Onset    Pancreatic cancer Father 53        remote hx of smoking and alcohol    Colon cancer Father 27    Other Sister         Neg [facility] 4/2022    Breast cancer Maternal Aunt 70        ER+    Prostate cancer Paternal Uncle 65        Mets to bone    Lung cancer Paternal Uncle         small cell, +smoker    Breast cancer Other        SocHx:  Social History     Socioeconomic History    Marital status: Single   Tobacco Use    Smoking status: Former    Smokeless tobacco: Never   Substance and Sexual Activity    Alcohol use: No    Drug use: No    Sexual activity: Not Currently     Partners: Female       Distress Score             Practical Problems Physical Problems                                                   Family Problems                                         Emotional Problems                                                          Spiritual/Religions Concerns               Other Problems                Objective:        There were no vitals filed for this visit.      Physical Exam  Vitals reviewed.   Constitutional:       Appearance: Normal appearance. He is normal weight.   Eyes:      Extraocular Movements: Extraocular movements intact.      Conjunctiva/sclera: Conjunctivae normal.      Pupils: Pupils are equal, round, and reactive to light.   Pulmonary:      Effort: Pulmonary effort is normal.   Neurological:      Mental Status: He is alert and oriented to person, place, and time.   Psychiatric:         Mood and Affect: Mood normal.         Behavior: Behavior normal.         Thought Content: Thought content normal.         Judgment: Judgment normal.               LABS:  WBC   Date Value Ref Range Status   04/28/2023 5.71 3.90 - 12.70 K/uL Final     Hemoglobin   Date Value Ref Range Status   04/28/2023 13.2 (L) 14.0 - 18.0 g/dL Final     Hematocrit   Date Value Ref Range Status   04/28/2023 41.0 40.0 - 54.0 % Final     Platelets   Date Value Ref Range Status   04/28/2023 209 150 - 450 K/uL Final       Chemistry        Component Value Date/Time     04/28/2023 1151    K 4.9 04/28/2023 1151     04/28/2023 1151    CO2 28 04/28/2023 1151    BUN 19 04/28/2023 1151    CREATININE 0.9 04/28/2023 1151    GLU 98 04/28/2023 1151        Component Value Date/Time    CALCIUM 9.5 04/28/2023 1151    ALKPHOS 59 04/28/2023 1151    AST 20 04/28/2023 1151    ALT 19 04/28/2023 1151    BILITOT 0.2 04/28/2023 1151          No results found for: [id number]      Lab Results   Component Value Date    PSADIAG 18.8 (H) 04/28/2023    PSADIAG 22.1 (H) 04/04/2023        IMAGING:  NM PET CT F 18 PYL PSMA, Midthigh to Vertex  Narrative: EXAMINATION:  NM PET CT F 18 PYL PSMA, MIDTHIGH TO VERTEX    CLINICAL HISTORY:  Malignant neoplasm of prostate    TECHNIQUE:  Approximately 60 minutes following the intravenous injection of 10.273 mCi F-18  piflufolastat, PET images were acquired from the proximal thighs to the skull vertex. CT images were also acquired for attenuation correction and anatomic localization and performed without oral or IV contrast.    COMPARISON:  Bone scan 02/28/2023    CT abdomen pelvis 02/28/2023    FINDINGS:  In the head and neck, there is physiologic uptake in the lacrimal and salivary glands, and there are no tracer avid lesions suspicious for malignancy.    In the chest, there are no tracer avid lesions suspicious for malignancy.    In the abdomen and pelvis, there is physiologic distribution in the liver, spleen, bowel, and genitourinary tract.  Focal uptake about the midportion of the prostate with a maximum SUV of 22.8.  No surrounding tracer avid lymph nodes.  Otherwise no tracer avid lesions suspicious for malignancy.    There is nonspecific uptake in the inguinal lymph nodes.    In the bones, there is physiologic uptake in the bone marrow, and there are no tracer avid lesions suspicious for malignancy.    Additional CT findings:    Moderate aortic atherosclerotic calcifications.  Prostatic calcification.  Mild bilateral gynecomastia.  Impression: Focal prostatic radiotracer avid lesion correlating with known malignancy.    No evidence of regional or distant radiotracer avid metastatic disease.    I, Sourav Vargas MD, attest that I reviewed and interpreted the images.    Electronically signed by resident: Cortes Sen  Date:    03/31/2023  Time:    15:23    Electronically signed by: Sourav Vargas  Date:    03/31/2023  Time:    15:42      Cholesterol Monitoring: Patient will send last lipids.  No results found for: CHOL  No results found for: HDL  No results found for: LDLCALC  No results found for: DLDL  No results found for: TRIG    ASCVD Risk    The ASCVD Risk score (Boris DK, et al., 2019) failed to calculate for the following reasons:    Cannot find a previous HDL lab    Cannot find a previous total cholesterol  lab        Assessment:       1. Prostate cancer    2. Encounter for monitoring androgen deprivation therapy          Plan:      1,2- Cancer Staging   Prostate cancer  Staging form: Prostate, AJCC 8th Edition  - Clinical stage from 2/14/2023: Stage IIC (cT2b, cN0, cM0, PSA: 17.7, Grade Group: 3) - Signed by Patricia Willson NP on 4/28/2023      Jose Fischer is a 63 y.o. male, patient of Dr. Elizabeth, seen today in [facility] to discuss treatment for his recent diagnosis of prostate cancer. Long conversation with patient regarding prostate cancer disease, natural history of the disease, treatment options per NCCN guidelines. Patient has decided to pursue XRT and ADT. Discussed he will receive ADT for 6 months and will do PSA surveillance every 6 months for 5 years following definitive treatment and then yearly to monitor for recurrence.     No significant side effects from Eligard. Final Eligard due 7/28/23. Sim scheduled for 6/30.    RTC on 7/28 with labs (CBC,CMP,PSA, teststerone), to see me and Eligard.    Patient is in agreement with the proposed treatment plan. All questions were answered to the patient's satisfaction. Pt knows to call clinic if anything is needed before the next clinic visit.    Patricia Willson, MSN, APRN, FNP-C  Hematology and Medical Oncology  Nurse Practitioner to Dr. Ronald Elizabeth  Nurse Practitioner, [facility]        The patient location is: home  The chief complaint leading to consultation is: 1 month check up    Visit type: audiovisual    Face to Face time with patient: 15 minutes  20 minutes of total time spent on the encounter, which includes face to face time and non-face to face time preparing to see the patient (eg, review of tests), Obtaining and/or reviewing separately obtained history, Documenting clinical information in the electronic or other health record, Independently interpreting results (not separately reported) and  communicating results to the patient/family/caregiver, or Care coordination (not separately reported).         Each patient to whom he or she provides medical services by telemedicine is:  (1) informed of the relationship between the physician and patient and the respective role of any other health care provider with respect to management of the patient; and (2) notified that he or she may decline to receive medical services by telemedicine and may withdraw from such care at any time.

## 2023-06-01 ENCOUNTER — HOSPITAL ENCOUNTER (OUTPATIENT)
Dept: RADIATION THERAPY | Facility: HOSPITAL | Age: 63
Discharge: HOME | End: 2023-06-01
Attending: STUDENT IN AN ORGANIZED HEALTH CARE EDUCATION/TRAINING PROGRAM
Payer: COMMERCIAL

## 2023-06-14 ENCOUNTER — PATIENT MESSAGE (OUTPATIENT)
Dept: HEMATOLOGY/ONCOLOGY | Facility: CLINIC | Age: 63
End: 2023-06-14
Payer: COMMERCIAL

## 2023-06-15 ENCOUNTER — PATIENT MESSAGE (OUTPATIENT)
Dept: HEMATOLOGY/ONCOLOGY | Facility: CLINIC | Age: 63
End: 2023-06-15
Payer: COMMERCIAL

## 2023-06-15 ENCOUNTER — TELEPHONE (OUTPATIENT)
Dept: HEMATOLOGY/ONCOLOGY | Facility: CLINIC | Age: 63
End: 2023-06-15
Payer: COMMERCIAL

## 2023-06-15 NOTE — NURSING
RN Navigator lvm for patient with return contact information to discuss Integrative Oncology resources and benefits to outcomes on ADT. Portal message sent to patient with additional information including upcoming SMV and future scheduling with Anjana Jolley RN.

## 2023-06-16 ENCOUNTER — OFFICE VISIT (OUTPATIENT)
Dept: HEMATOLOGY/ONCOLOGY | Facility: CLINIC | Age: 63
End: 2023-06-16
Payer: COMMERCIAL

## 2023-06-16 ENCOUNTER — TELEPHONE (OUTPATIENT)
Dept: HEMATOLOGY/ONCOLOGY | Facility: CLINIC | Age: 63
End: 2023-06-16
Payer: COMMERCIAL

## 2023-06-16 ENCOUNTER — TELEPHONE (OUTPATIENT)
Dept: RADIATION ONCOLOGY | Facility: CLINIC | Age: 63
End: 2023-06-16
Payer: COMMERCIAL

## 2023-06-16 DIAGNOSIS — F32.A DEPRESSION, UNSPECIFIED DEPRESSION TYPE: ICD-10-CM

## 2023-06-16 DIAGNOSIS — R61 NIGHT SWEATS: ICD-10-CM

## 2023-06-16 DIAGNOSIS — G43.909 MIGRAINES: ICD-10-CM

## 2023-06-16 DIAGNOSIS — M54.50 LOW BACK PAIN: ICD-10-CM

## 2023-06-16 DIAGNOSIS — Z79.818 ANDROGEN DEPRIVATION THERAPY: ICD-10-CM

## 2023-06-16 DIAGNOSIS — Z79.818 ENCOUNTER FOR MONITORING ANDROGEN DEPRIVATION THERAPY: ICD-10-CM

## 2023-06-16 DIAGNOSIS — C61 PROSTATE CANCER: ICD-10-CM

## 2023-06-16 DIAGNOSIS — M25.50 ARTHRALGIA, UNSPECIFIED JOINT: ICD-10-CM

## 2023-06-16 DIAGNOSIS — R51.9 HEADACHE: Primary | ICD-10-CM

## 2023-06-16 DIAGNOSIS — C61 PROSTATE CANCER: Primary | ICD-10-CM

## 2023-06-16 DIAGNOSIS — F32.A DEPRESSION: ICD-10-CM

## 2023-06-16 DIAGNOSIS — M25.50 JOINT PAIN: ICD-10-CM

## 2023-06-16 PROCEDURE — 99213 OFFICE O/P EST LOW 20 MIN: CPT | Mod: 95,,, | Performed by: NURSE PRACTITIONER

## 2023-06-16 PROCEDURE — 99213 PR OFFICE/OUTPT VISIT, EST, LEVL III, 20-29 MIN: ICD-10-PCS | Mod: 95,,, | Performed by: NURSE PRACTITIONER

## 2023-06-16 NOTE — TELEPHONE ENCOUNTER
----- Message from Jacinta Miranda sent at 6/16/2023  9:54 AM CDT -----  Regarding: call back  Pt called you back please call back at [phone]

## 2023-06-16 NOTE — PROGRESS NOTES
[facility]    Subjective:       Patient ID: Joes Fischer    Chief Complaint:     HPI    Jose Fischer is a 63 y.o. male, patient of Dr. Elizabeth, to clinic for follow up for virtual visit. He has been having chronic pain, fatigue, brain fog, crying spells, depression, nocturia, night sweats and decline in overall well-being. He is a  and finishing trial next week. Will take a break until September. Spoke with Penelope Bowden this morning and feels optimistic about trying holistic approach. Would like to hold off on starting Effexor as previously discussed. He has decided to pursue concurrent XRT and ADT. SIM is scheduled 6/30/23. 6 weeks of XRT will followReceived ADT on 4/28/23. Patient exercising daily. No issues. No weight gain.     Presents with his wife, Karla. ECOG 0.    Initial visit surveys:  AUA score 23 (3,5,3,4,2,1,5) mostly satisfied  AMALIA score 25 (5,5,5,5,5) no ED    Oncologic History:  9/30/2016 TRUS prostate biopsy (Dr Samaniego) benign     11/21/2022 PSA 17.72     2/14/2023 TRUS prostate biopsy (Dr Zane Oreilly, [facility], [place])              6/12 standard corse involved (left only) GS 9 =5+4 (GG5)     2/28/2023 NM bone scan (outside)  No evidence of mets     2/28/2023 CT A/P              No mets    3/14/23- Negative PSMA scan except for known malignancy.    3/24/23 Reread of his pathology at [facility]:  Final Pathologic Diagnosis BIOPSIES OF RIGHT BASE, RIGHT MID, AND RIGHT APEX OF THE PROSTATE:   NO CARCINOMA IDENTIFIED   BIOPSIES OF LEFT BASE, LEFT MID, LEFT APEX OF THE PROSTATE:   ADENOCARCINOMA TOTAL LITO SCORE 7 (4+3)--GRADE GROUP 3     Oncology History   Prostate cancer   2/14/2023 Cancer Staged    Staging form: Prostate, AJCC 8th Edition  - Clinical stage from 2/14/2023: Stage IIC (cT2b, cN0, cM0, PSA: 17.7, Grade Group: 3)     3/14/2023 Initial Diagnosis    Prostate cancer     4/11/2023 Genetic Testing    Ambry 91-gene panel: Negative.         Review of Systems   Constitutional:  Negative for activity change, appetite change, fatigue, fever and unexpected weight change.   HENT:  Negative for mouth sores, nosebleeds and trouble swallowing.    Eyes:  Negative for discharge and visual disturbance.   Respiratory:  Negative for cough, shortness of breath and wheezing.    Cardiovascular:  Negative for chest pain and leg swelling.   Gastrointestinal:  Negative for abdominal distention, abdominal pain, blood in stool, constipation, diarrhea, nausea and vomiting.   Genitourinary:  Negative for decreased urine volume, difficulty urinating, frequency and hematuria.   Musculoskeletal:  Negative for back pain.   Skin:  Negative for color change and rash.   Neurological:  Negative for weakness and headaches.   Hematological:  Negative for adenopathy.   Psychiatric/Behavioral:  Negative for sleep disturbance. The patient is not nervous/anxious.    All other systems reviewed and are negative.    Allergies:  Review of patient's allergies indicates:  No Known Allergies    Medications:  Current Outpatient Medications   Medication Sig Dispense Refill    sildenafiL (VIAGRA) 50 MG tablet Take 50 mg by mouth daily as needed for Erectile Dysfunction.       No current facility-administered medications for this visit.       PMH:  Past Medical History:   Diagnosis Date    Depression     Prostate cancer        PSH:  Past Surgical History:   Procedure Laterality Date    PROSTATE BIOPSY  02/14/2023       FamHx:  Family History   Problem Relation Age of Onset    Pancreatic cancer Father 53        remote hx of smoking and alcohol    Colon cancer Father 27    Other Sister         Neg Bola 4/2022    Breast cancer Maternal Aunt 70        ER+    Prostate cancer Paternal Uncle 65        Mets to bone    Lung cancer Paternal Uncle         small cell, +smoker    Breast cancer Other        SocHx:  Social History     Socioeconomic History    Marital status: Single   Tobacco Use    Smoking  status: Former    Smokeless tobacco: Never   Substance and Sexual Activity    Alcohol use: No    Drug use: No    Sexual activity: Not Currently     Partners: Female       Distress Score    Distress Score: (P) 8        Practical Problems Physical Problems                                                   Family Problems                                         Emotional Problems                                                         Spiritual/Religions Concerns     Spiritual / Muslim Concerns: (P) No         Other Problems                Objective:        There were no vitals filed for this visit.      Physical Exam  Vitals reviewed.   Constitutional:       Appearance: Normal appearance. He is normal weight.   Eyes:      Extraocular Movements: Extraocular movements intact.      Conjunctiva/sclera: Conjunctivae normal.      Pupils: Pupils are equal, round, and reactive to light.   Pulmonary:      Effort: Pulmonary effort is normal.   Neurological:      Mental Status: He is alert and oriented to person, place, and time.   Psychiatric:         Mood and Affect: Mood normal.         Behavior: Behavior normal.         Thought Content: Thought content normal.         Judgment: Judgment normal.               LABS:  WBC   Date Value Ref Range Status   04/28/2023 5.71 3.90 - 12.70 K/uL Final     Hemoglobin   Date Value Ref Range Status   04/28/2023 13.2 (L) 14.0 - 18.0 g/dL Final     Hematocrit   Date Value Ref Range Status   04/28/2023 41.0 40.0 - 54.0 % Final     Platelets   Date Value Ref Range Status   04/28/2023 209 150 - 450 K/uL Final       Chemistry        Component Value Date/Time     04/28/2023 1151    K 4.9 04/28/2023 1151     04/28/2023 1151    CO2 28 04/28/2023 1151    BUN 19 04/28/2023 1151    CREATININE 0.9 04/28/2023 1151    GLU 98 04/28/2023 1151        Component Value Date/Time    CALCIUM 9.5 04/28/2023 1151    ALKPHOS 59 04/28/2023 1151    AST 20 04/28/2023 1151    ALT 19 04/28/2023 1151     BILITOT 0.2 04/28/2023 1151          No results found for: [id number]      Lab Results   Component Value Date    PSADIAG 18.8 (H) 04/28/2023    PSADIAG 22.1 (H) 04/04/2023          Assessment:       1. Prostate cancer    2. Encounter for monitoring androgen deprivation therapy    3. Arthralgia, unspecified joint    4. Depression, unspecified depression type    5. Night sweats    6. Androgen deprivation therapy          Plan:       Cancer Staging   Prostate cancer  Staging form: Prostate, AJCC 8th Edition  - Clinical stage from 2/14/2023: Stage IIC (cT2b, cN0, cM0, PSA: 17.7, Grade Group: 3) - Signed by Patricia Willson NP on 4/28/2023      Jose Fischer is a 63 y.o. male, patient of Dr. Elizabeth, seen today in [facility] to discuss treatment for his recent diagnosis of prostate cancer. Long conversation with patient regarding prostate cancer disease, natural history of the disease, treatment options per NCCN guidelines. Patient has decided to pursue XRT and ADT. Discussed he will receive ADT for 6 months and will do PSA surveillance every 6 months for 5 years following definitive treatment and then yearly to monitor for recurrence.  Final Eligard due 7/28/23. Sim scheduled for 6/30.    Discussed symptoms, will try holistic options with Dr. Aiken. He denies SI/HI, so I think this is worth trying.He knows to call me immediately should he decide he would like to try Effexor.     RTC as scheduled    Patient is in agreement with the proposed treatment plan. All questions were answered to the patient's satisfaction. Pt knows to call clinic if anything is needed before the next clinic visit.    Patricia Willson, MSN, APRN, FNP-C  Hematology and Medical Oncology  Nurse Practitioner to Dr. Ronald Elizabeth  Nurse Practitioner, [facility]        The patient location is: home  The chief complaint leading to consultation is:     Visit type: audiovisual    Face to Face time with  patient: 15 minutes  20 minutes of total time spent on the encounter, which includes face to face time and non-face to face time preparing to see the patient (eg, review of tests), Obtaining and/or reviewing separately obtained history, Documenting clinical information in the electronic or other health record, Independently interpreting results (not separately reported) and communicating results to the patient/family/caregiver, or Care coordination (not separately reported).         Each patient to whom he or she provides medical services by telemedicine is:  (1) informed of the relationship between the physician and patient and the respective role of any other health care provider with respect to management of the patient; and (2) notified that he or she may decline to receive medical services by telemedicine and may withdraw from such care at any time.

## 2023-06-16 NOTE — NURSING
Patient referred to [facility] for personal history of prostate cancer with current symptoms on ADT. Future plan of care includes concurrent ADT with XRT. SIM is scheduled 6/30/23. 6 weeks of XRT will follow. He will reside in [place] for this period of time.    Patient notes current symptoms of chronic pain, fatigue, brain fog, crying spells, depression, nocturia, night sweats and decline in overall well-being.     Patient enjoys gardening and exercising. He uses walking and running to healthfully combat crying spells that have become more apparent since ADT.     He notes current career can be demanding and stressful. Patient is a  and has found treatment has left his mind weaker and unable to perform trials as he had in the past. Colleagues have stepped in recently.     Patient desires to address chronic shoulder pain, chronic low back pain and generalized arthralgias. He mentions overall decline in stamina. Desires to combat arthralgias and avoid debilitating decline with aging. Patient hopes to improve strength and balance to avoid falls and fractures in the future.     OT and Acupuncture discussed in length.     Patient mentions severe night sweats accompany ADT. Nocturia and night sweats aile his sleep and contribute to insomnia.     Patient desires to incorporate mindfulness, take on new aspects of coping and CBT, fight fatigue and depression.     Patient mentions a healthy diet but desires to learn integrative approach to nutrition, reduce inflammation and proactively create greater vitality within his diet.     Patient wishes to hold off on Effexor for medication mgt at this time. He mentions Effexor caused him to feel unlike himself and all too drowsy. He currently denies suicidal ideations. He desires to proceed with psychology and will follow-up with Dr. Aiken for greater insight.     Patient will see Mariella Chen NP to address current nocturia and sexual wellness concerns.      Case reviewed and discussed with Dr. Aiken. Referrals will be placed per protocol for dietitian, acupuncture, OT and psychology. Patient will see Dr. Aiken on June 30th at 0930. DENNYS Yeung.

## 2023-06-19 ENCOUNTER — PATIENT MESSAGE (OUTPATIENT)
Dept: RADIATION ONCOLOGY | Facility: CLINIC | Age: 63
End: 2023-06-19
Payer: COMMERCIAL

## 2023-06-20 ENCOUNTER — TELEPHONE (OUTPATIENT)
Dept: INFUSION THERAPY | Facility: HOSPITAL | Age: 63
End: 2023-06-20
Payer: COMMERCIAL

## 2023-06-20 ENCOUNTER — PATIENT MESSAGE (OUTPATIENT)
Dept: HEMATOLOGY/ONCOLOGY | Facility: CLINIC | Age: 63
End: 2023-06-20
Payer: COMMERCIAL

## 2023-06-20 DIAGNOSIS — C61 PROSTATE CANCER: Primary | ICD-10-CM

## 2023-06-20 RX ORDER — BUPROPION HYDROCHLORIDE 150 MG/1
150 TABLET ORAL DAILY
Qty: 30 TABLET | Refills: 11 | Status: SHIPPED | OUTPATIENT
Start: 2023-06-20 | End: 2024-03-01

## 2023-06-23 ENCOUNTER — PATIENT MESSAGE (OUTPATIENT)
Dept: HEMATOLOGY/ONCOLOGY | Facility: CLINIC | Age: 63
End: 2023-06-23
Payer: COMMERCIAL

## 2023-06-28 ENCOUNTER — PATIENT MESSAGE (OUTPATIENT)
Dept: HEMATOLOGY/ONCOLOGY | Facility: CLINIC | Age: 63
End: 2023-06-28
Payer: COMMERCIAL

## 2023-06-29 ENCOUNTER — CLINICAL SUPPORT (OUTPATIENT)
Dept: REHABILITATION | Facility: HOSPITAL | Age: 63
End: 2023-06-29
Attending: OBSTETRICS & GYNECOLOGY
Payer: COMMERCIAL

## 2023-06-29 DIAGNOSIS — C61 PROSTATE CANCER: ICD-10-CM

## 2023-06-29 DIAGNOSIS — M25.50 JOINT PAIN: ICD-10-CM

## 2023-06-29 DIAGNOSIS — M54.50 LOW BACK PAIN: ICD-10-CM

## 2023-06-29 DIAGNOSIS — F32.A DEPRESSION, UNSPECIFIED DEPRESSION TYPE: ICD-10-CM

## 2023-06-29 DIAGNOSIS — M54.50 LOW BACK PAIN, UNSPECIFIED BACK PAIN LATERALITY, UNSPECIFIED CHRONICITY, UNSPECIFIED WHETHER SCIATICA PRESENT: ICD-10-CM

## 2023-06-29 DIAGNOSIS — F32.A DEPRESSION: ICD-10-CM

## 2023-06-29 DIAGNOSIS — Z79.818 ANDROGEN DEPRIVATION THERAPY: ICD-10-CM

## 2023-06-29 DIAGNOSIS — R61 NIGHT SWEATS: Primary | ICD-10-CM

## 2023-06-29 DIAGNOSIS — G43.909 MIGRAINES: ICD-10-CM

## 2023-06-29 PROCEDURE — 97810 ACUP 1/> WO ESTIM 1ST 15 MIN: CPT | Performed by: ACUPUNCTURIST

## 2023-06-29 PROCEDURE — 97811 ACUP 1/> W/O ESTIM EA ADD 15: CPT | Performed by: ACUPUNCTURIST

## 2023-06-29 NOTE — PROGRESS NOTES
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    Migraines     Joint pain     Androgen deprivation therapy     Depression     Low back pain     Prostate cancer     Night sweats         Evaluation Date: 6/29/2023    Visit #/Visits authorized: SELF PAY    Precautions: Standard and cancer    Subjective     Chief Concern: hot flashes, emotional deregulation     Sobbing bouts every day - stress, every morning at 10am  Patient admits he was emotional previously, was on Effexor in his 30s and 40s    Night sweats - getting sweats the moment he falls asleep, soaking through clothes.   Has shirts next to him to change into. Takes shower in middle of the night.       Medical necessity is demonstrated by the following IMPAIRMENTS: Medical Necessity: Decreased quality of life              Aggravating Factors:  stress   Relieving Factors:  nothing    Symptom Description:     Quality:  Variable  Severity:  8  Frequency:  continuously    Previous Treatments Tried:  Medication    HEENT:  none    Chest:  none    Digestion:     Diet: in general, a "healthy" diet     Fluids: coffee 5 /day, is drinking plenty of fluids, none  Taste/Appetite: good   Symptoms: none    Sleep: 5 hours maybe, waking up from night sweats    Energy Levels:  intermittent     Psychological Symptoms:  anxiety and depression episodes    Other Symptoms: none      Objective     Observation: patient looks healthy and of good color.     Tongue:      Body:  cracked   Color:  red   Coating:  thin,  and no coat    Pulse:        slippery and slow       New Findings:  none    Treatment     Treatment Principles:  tonify yin, calm dickerson    Acupuncture points used:  4 MENSAH, Du20, Ht7, Pc6, Lu9 , Ki3, Ki6, Li11, Lu7, REN4, REN6, DICKERSON MEN, Sp6, Sp9, SSC, St36, and YIN PAIZ    Needles In: 26  Needles Out: 26  Needles W/ STIM " placed: 1:20 PM  Needles W/ STIM removed: 1:50 PM      Assessment     After treatment, patient felt relaxed. Patient will track symptoms this week to find baseline of night sweats and emotional occurences      Patient prognosis is Good.     Patient will continue to benefit from acupuncture treatment to address the deficits listed in the problem list box on initial evaluation, provide patient family education and to maximize pt's level of independence in the home and community environment.     Patient's spiritual, cultural and educational needs considered and pt agreeable to plan of care and goals.     Anticipated barriers to treatment: none    Plan     Recommend 1 /week for 5 more sessions then re-assess.      Education:  Patient is aware of cumulative benefit of acupuncture

## 2023-06-30 ENCOUNTER — OFFICE VISIT (OUTPATIENT)
Dept: PSYCHIATRY | Facility: CLINIC | Age: 63
End: 2023-06-30
Payer: COMMERCIAL

## 2023-06-30 ENCOUNTER — HOSPITAL ENCOUNTER (OUTPATIENT)
Dept: RADIATION THERAPY | Facility: HOSPITAL | Age: 63
Discharge: HOME | End: 2023-06-30
Attending: STUDENT IN AN ORGANIZED HEALTH CARE EDUCATION/TRAINING PROGRAM
Payer: COMMERCIAL

## 2023-06-30 DIAGNOSIS — C61 PROSTATE CANCER: ICD-10-CM

## 2023-06-30 DIAGNOSIS — F43.23 ADJUSTMENT DISORDER WITH MIXED ANXIETY AND DEPRESSED MOOD: Primary | ICD-10-CM

## 2023-06-30 PROCEDURE — 77334 PR  RADN TREATMENT AID(S) COMPLX: ICD-10-PCS | Mod: 26,,, | Performed by: STUDENT IN AN ORGANIZED HEALTH CARE EDUCATION/TRAINING PROGRAM

## 2023-06-30 PROCEDURE — 99999 PR PBB SHADOW E&M-EST. PATIENT-LVL I: ICD-10-PCS | Mod: PBBFAC,,, | Performed by: CASE MANAGER/CARE COORDINATOR

## 2023-06-30 PROCEDURE — 90791 PR PSYCHIATRIC DIAGNOSTIC EVALUATION: ICD-10-PCS | Mod: S$GLB,,, | Performed by: CASE MANAGER/CARE COORDINATOR

## 2023-06-30 PROCEDURE — 99999 PR PBB SHADOW E&M-EST. PATIENT-LVL I: CPT | Mod: PBBFAC,,, | Performed by: CASE MANAGER/CARE COORDINATOR

## 2023-06-30 PROCEDURE — 77014 PR  CT GUIDANCE PLACEMENT RAD THERAPY FIELDS: CPT | Mod: 26,,, | Performed by: STUDENT IN AN ORGANIZED HEALTH CARE EDUCATION/TRAINING PROGRAM

## 2023-06-30 PROCEDURE — 90791 PSYCH DIAGNOSTIC EVALUATION: CPT | Mod: S$GLB,,, | Performed by: CASE MANAGER/CARE COORDINATOR

## 2023-06-30 PROCEDURE — 77263 THER RADIOLOGY TX PLNG CPLX: CPT | Mod: ,,, | Performed by: STUDENT IN AN ORGANIZED HEALTH CARE EDUCATION/TRAINING PROGRAM

## 2023-06-30 PROCEDURE — 77014 HC CT GUIDANCE RADIATION THERAPY FLDS PLACEMENT: CPT | Mod: TC | Performed by: STUDENT IN AN ORGANIZED HEALTH CARE EDUCATION/TRAINING PROGRAM

## 2023-06-30 PROCEDURE — 77014 PR  CT GUIDANCE PLACEMENT RAD THERAPY FIELDS: ICD-10-PCS | Mod: 26,,, | Performed by: STUDENT IN AN ORGANIZED HEALTH CARE EDUCATION/TRAINING PROGRAM

## 2023-06-30 PROCEDURE — 77263 PR  RADIATION THERAPY PLAN COMPLEX: ICD-10-PCS | Mod: ,,, | Performed by: STUDENT IN AN ORGANIZED HEALTH CARE EDUCATION/TRAINING PROGRAM

## 2023-06-30 PROCEDURE — 77334 RADIATION TREATMENT AID(S): CPT | Mod: 26,,, | Performed by: STUDENT IN AN ORGANIZED HEALTH CARE EDUCATION/TRAINING PROGRAM

## 2023-06-30 PROCEDURE — 77334 RADIATION TREATMENT AID(S): CPT | Mod: TC | Performed by: STUDENT IN AN ORGANIZED HEALTH CARE EDUCATION/TRAINING PROGRAM

## 2023-06-30 NOTE — PROGRESS NOTES
PSYCHO-ONCOLOGY INTAKE    Diagnostic Interview - CPT 55398    Date: 6/30/2023  Site: [facility]     Evaluation Length (direct face-to-face time):  1 hour    This includes face to face time and non-face to face time preparing to see the patient, obtaining and/or reviewing separately obtained history, documenting clinical information in the electronic or other health record, independently interpreting results and communicating results to the patient/family/caregiver, or care coordinator.     Referral Source: Ronald Elizabeth MD   Oncologist: Ronald Elizabeth MD   PCP: Annabelle Garcia MD    Clinical status of patient: Outpatient    Jose Fischer, a 63 y.o. male, seen for initial evaluation visit.  INFORMED CONSENT/ LIMITS of CONFIDENTIALITY: Prior to beginning the interview, the patient's identification was confirmed using two identifiers. Jose Fischer  was informed of the possible risks and benefits of psychological interventions (e.g., counseling, psychotherapy, testing) and provided information regarding the handling of protected health records and   the limits of confidentiality, including the importance of reporting any suicidal or homicidal ideation to ensure safety of all parties. This provider explained the purpose of today's appointment and the patient was provided with time to ask questions regarding this information.  Acceptance and understanding of these conditions was expressed, and Jose Fischer freely consented to this evaluation.     Chief complaint/reason for encounter: adjustment to illness, depression, anxiety, and sleep    Medical/Surgical History:    Patient Active Problem List   Diagnosis    Elevated prostate specific antigen (PSA)    BPH with urinary obstruction    Prostate cancer       Health Behaviors:       ETOH Use: No      Tobacco Use: None currently. Patient noted he quit smoking cigarettes about 25-30 years ago.   Illicit Drug Use:  No     Prescription Misuse:No   Caffeine: At  least 3 cups of caffeine in AM   Exercise:The patient maintains a regular, healthy exercise program. He noted he started increasing about 3 months ago and enjoys it.   Firearms:  No   Advanced directives:No     Family History:   Psychiatric illness: Patient stated sister has had difficulties with anxiety and his mother may have had difficulties with depression in the past. He also stated his brother had been diagnosed with depression.    Alcohol/Drug Abuse: Yes, patient shared his father was an alcoholic and others too.     Suicide: Yes, patient shared that his brother had  by suicide.     Past Psychiatric History:   Inpatient treatment: No     Outpatient treatment: Yes, patient shared that he saw a psychiatrist in his 30s for depression. He also noted that he has previously attended counseling sessions briefly.     Prior substance abuse treatment: No     Suicide Attempts: No     Psychotropic Medications:  Current: Wellbutrin       Past: Ativan and Effexor; patient also noted he has been prescribed other psychotropic medications in the past but cannot recall the names of them.    Current medications as per below, allergies reviewed in chart.    Current Outpatient Medications   Medication    buPROPion (WELLBUTRIN XL) 150 MG TB24 tablet    sildenafiL (VIAGRA) 50 MG tablet     No current facility-administered medications for this visit.              Social situation/Stressors: Jose Fischer lives with a cousin in [place], [place]. He shared that he has rented from her for about the last  years.  He is a full-time .  He has been in his job for 3.5 years.  He reported stable employment prior to his current job.  Jose Fischer shared that he and his ex-wife  about 7-8 years ago and that he has 3 children. The patient reports good social support including from his cousin, children, sister, and a close friend. Patient noted diffiuclties with his current treatment course as he noted  night sweats making it difficult to sleep and crying spells often.  Jose Fischer shared that he is  [employer] and enjoys studying [employer] .  Jose Fischer's hobbies include working in the yard.  Additional stressors: Patient noted that difficulties with sleep and mood have been present the last few weeks    Strengths:Able to vocalize needs, Motivation, readiness for change, Interpersonal relationships and supports available - family, relatives, friends, and Financial stability  Liabilities: Patient noted that he has been having night sweats and difficulties with mood including crying spells and irritability that he noted started a little over a month after starting androgen deprivation therapy.    Current Evaluation:     Mental Status Exam: Jose Fischer arrived promptly for the assessment session.  The patient was fully cooperative throughout the interview and was an adequate historian   Appearance: age appropriate,  well  dressed, well groomed  Behavior/Cooperation: friendly and cooperative  Speech: normal in rate, volume, and tone and appropriate quality, quantity and organization of sentences  Mood: euthymic  Affect: mood congruent and appropriate  Thought Process: goal-directed, logical  Thought Content: normal, no suicidality, no homicidality, delusions, or paranoia; did not appear to be responding to internal stimuli during the interview.   Orientation: grossly intact  Memory: grossly intact  Attention Span/Concentration: Attends to interview without distraction; patient noted difficulties at times with concentration in his life  Fund of Knowledge: above average  Estimate of Intelligence: above average from verbal skills and history  Cognition: grossly intact  Insight: patient has awareness of illness; good insight into own behavior and behavior of others  Judgment: the patient's behavior is adequate to circumstances      History of present illness:    Oncology History   Prostate cancer   2/14/2023 Cancer  Staged    Staging form: Prostate, AJCC 8th Edition  - Clinical stage from 2/14/2023: Stage IIC (cT2b, cN0, cM0, PSA: 17.7, Grade Group: 3)     3/14/2023 Initial Diagnosis    Prostate cancer     4/11/2023 Genetic Testing    [facility] 91-gene panel: Negative.            Jose Fischer has adjusted to illness fairly well at first. He noted some difficulties coping a little over a month after starting androgen deprivation therapy. He noted increased irritability and low mood.  He has engaged in appropriate information gathering.  The patient has good family/friend support.  His support system is coping well with the diagnosis/treatment/prognosis. Illness-related psychosocial stressors include  increased irritability and sweating at night .  The patient has a good partnership with [facility] treatment team. The patient reports the following barriers to cancer care:none.     NCCN Distress thermometer:   DISTRESS SCREENING 6/30/2023 6/16/2023 5/29/2023 4/28/2023 4/11/2023 4/11/2023 4/11/2023   Distress Score 10 - Extreme Distress 8 0 - No Distress 0 - No Distress 0 - No Distress 0 - No Distress 4   Practical Problems Work/School Work/School Treatment Decisions None of these - None of these -   Family Problems None of these None of these None of these None of these - None of these -   Emotional Problems Depression;Fears;Sadness Depression None of these None of these - None of these -   Spiritual / Anabaptist Concerns No No No No - No -   Physical Problems Memory/Concentration;Sexual;Sleep Sleep None of these None of these - None of these -        GAD7 6/30/2023   1. Feeling nervous, anxious, or on edge? 1   2. Not being able to stop or control worrying? 1   3. Worrying too much about different things? 0   4. Trouble relaxing? 0   5. Being so restless that it is hard to sit still? 0   6. Becoming easily annoyed or irritable? 3   7. Feeling afraid as if something awful might happen? 1   PIPE-7 Score 6          PHQ  ANSWERS    Q1. Little interest or pleasure in doing things: Not at all (06/30/23 1408)  Q2. Feeling down, depressed, or hopeless: Nearly every day (06/30/23 1408)  Q3. Trouble falling or staying asleep, or sleeping too much: Nearly every day (06/30/23 1408)  Q4. Feeling tired or having little energy: Several days (06/30/23 1408)  Q5. Poor appetite or overeating: Not at all (06/30/23 1408)  Q6. Feeling bad about yourself - or that you are a failure or have let yourself or your family down: Several days (06/30/23 1408)  Q7. Trouble concentrating on things, such as reading the newspaper or watching television: More than half the days (06/30/23 1408)  Q8. Moving or speaking so slowly that other people could have noticed. Or the opposite - being so fidgety or restless that you have been moving around a lot more than usual: Not at all (06/30/23 1408)  Q9. Thoughts that you would be better off dead, or of hurting yourself in some way: Several days (06/30/23 1408)    PHQ8 Score : 10 (06/30/23 1408)  PHQ-9 Total Score: 11 (06/30/23 1408)     Symptoms:   Mood: tearfulness; feels sorry for self at times; depressed mood; prior depression:in his 30s ; no SI/HI; he did state that at times he has had thoughts of not wanting to continue treatment if side effects continue; NCCN Distress Screener=10; PHQ-9=11  Anxiety: Patient noted recent difficulties with stress; irritability; racing thoughts PIPE-7=6  Substance abuse: denied  Cognitive functioning:  Patient noted some decreased concentration at times`  Health behaviors:  Patient noted he has been having hot flashes  Sexual/Intimacy: Decreased libido  Sleep: Patient noted that prior to ADT treatment he slept about 8 hours per night; he noted sweating at night recently and that he had 5 hours of sleep last night  Pain: Mr. Fischer reported having headaches recently.  Pain Catastrophizing Scale: PCS total score (10; 24th%ile), helplessness (5; 36th%ile), magnification (1; 27th%ile),  and rumination (4; 26th%ile)      Assessment - Diagnosis - Goals:       ICD-10-CM ICD-9-CM   1. Adjustment disorder with mixed anxiety and depressed mood  F43.23 309.28   2. Prostate cancer  C61 185         Plan:individual psychotherapy    Summary and Recommendations  Jose Fischer is a 63 y.o. male referred by Ronald Elizabeth MD for psychological evaluation and treatment.  Mr. Fischer appears to be having some diffiuclty coping with his current treatment course. He noted sweating at nights is leading to difficulties with sleep. He also reported crying spells and irritability since in last few weeks.  He is interested in individual therapy for cancer coping skills and will follow up with me for that purpose. Patient was taught deep breathing and visual imagery coping skills and these were practiced during the session. Patient was encouraged to practice these daily. Patient was also provided with sleep log to complete and bring to next session. Patient would likely benefit from learning sleep hygiene skills in future session. He noted that one of his goals is to increase sleep quality.    Return to clinic: 2 weeks    GOALS:   Increase Coping Skills  Complete Sleep Log and bring to next session  Work on increasing sleep quality  Practice visual imagery and deep breathing exercises on own        Ritchie Luevano Psy.D.  Clinical Psychologist  [place] License #[id number]

## 2023-07-03 ENCOUNTER — HOSPITAL ENCOUNTER (OUTPATIENT)
Dept: RADIATION THERAPY | Facility: HOSPITAL | Age: 63
Discharge: HOME | End: 2023-07-03
Attending: STUDENT IN AN ORGANIZED HEALTH CARE EDUCATION/TRAINING PROGRAM
Payer: COMMERCIAL

## 2023-07-05 ENCOUNTER — PATIENT MESSAGE (OUTPATIENT)
Dept: REHABILITATION | Facility: HOSPITAL | Age: 63
End: 2023-07-05
Payer: COMMERCIAL

## 2023-07-06 ENCOUNTER — PATIENT MESSAGE (OUTPATIENT)
Dept: HEMATOLOGY/ONCOLOGY | Facility: CLINIC | Age: 63
End: 2023-07-06
Payer: COMMERCIAL

## 2023-07-06 ENCOUNTER — PATIENT MESSAGE (OUTPATIENT)
Dept: RADIATION ONCOLOGY | Facility: CLINIC | Age: 63
End: 2023-07-06
Payer: COMMERCIAL

## 2023-07-06 ENCOUNTER — OFFICE VISIT (OUTPATIENT)
Dept: UROLOGY | Facility: CLINIC | Age: 63
End: 2023-07-06
Payer: COMMERCIAL

## 2023-07-06 ENCOUNTER — TELEPHONE (OUTPATIENT)
Dept: HEMATOLOGY/ONCOLOGY | Facility: CLINIC | Age: 63
End: 2023-07-06
Payer: COMMERCIAL

## 2023-07-06 ENCOUNTER — TELEPHONE (OUTPATIENT)
Dept: RADIATION ONCOLOGY | Facility: CLINIC | Age: 63
End: 2023-07-06
Payer: COMMERCIAL

## 2023-07-06 ENCOUNTER — CLINICAL SUPPORT (OUTPATIENT)
Dept: REHABILITATION | Facility: HOSPITAL | Age: 63
End: 2023-07-06
Payer: COMMERCIAL

## 2023-07-06 VITALS
HEART RATE: 60 BPM | SYSTOLIC BLOOD PRESSURE: 108 MMHG | HEIGHT: 72 IN | BODY MASS INDEX: 19.64 KG/M2 | WEIGHT: 145 LBS | DIASTOLIC BLOOD PRESSURE: 62 MMHG

## 2023-07-06 DIAGNOSIS — C61 PROSTATE CANCER: ICD-10-CM

## 2023-07-06 DIAGNOSIS — R61 NIGHT SWEATS: ICD-10-CM

## 2023-07-06 DIAGNOSIS — N40.1 BPH WITH URINARY OBSTRUCTION: ICD-10-CM

## 2023-07-06 DIAGNOSIS — F32.A DEPRESSION, UNSPECIFIED DEPRESSION TYPE: ICD-10-CM

## 2023-07-06 DIAGNOSIS — R39.9 LOWER URINARY TRACT SYMPTOMS (LUTS): ICD-10-CM

## 2023-07-06 DIAGNOSIS — N13.8 BPH WITH URINARY OBSTRUCTION: ICD-10-CM

## 2023-07-06 DIAGNOSIS — C61 PROSTATE CANCER: Primary | ICD-10-CM

## 2023-07-06 DIAGNOSIS — N52.2 DRUG-INDUCED ERECTILE DYSFUNCTION: ICD-10-CM

## 2023-07-06 DIAGNOSIS — M54.50 LOW BACK PAIN, UNSPECIFIED BACK PAIN LATERALITY, UNSPECIFIED CHRONICITY, UNSPECIFIED WHETHER SCIATICA PRESENT: Primary | ICD-10-CM

## 2023-07-06 PROCEDURE — 97814 ACUP 1/> W/ESTIM EA ADDL 15: CPT | Performed by: ACUPUNCTURIST

## 2023-07-06 PROCEDURE — 99215 OFFICE O/P EST HI 40 MIN: CPT | Mod: S$GLB,,, | Performed by: NURSE PRACTITIONER

## 2023-07-06 PROCEDURE — 1159F PR MEDICATION LIST DOCUMENTED IN MEDICAL RECORD: ICD-10-PCS | Mod: CPTII,S$GLB,, | Performed by: NURSE PRACTITIONER

## 2023-07-06 PROCEDURE — 3078F DIAST BP <80 MM HG: CPT | Mod: CPTII,S$GLB,, | Performed by: NURSE PRACTITIONER

## 2023-07-06 PROCEDURE — 3074F SYST BP LT 130 MM HG: CPT | Mod: CPTII,S$GLB,, | Performed by: NURSE PRACTITIONER

## 2023-07-06 PROCEDURE — 1160F RVW MEDS BY RX/DR IN RCRD: CPT | Mod: CPTII,S$GLB,, | Performed by: NURSE PRACTITIONER

## 2023-07-06 PROCEDURE — 3078F PR MOST RECENT DIASTOLIC BLOOD PRESSURE < 80 MM HG: ICD-10-PCS | Mod: CPTII,S$GLB,, | Performed by: NURSE PRACTITIONER

## 2023-07-06 PROCEDURE — 97813 ACUP 1/> W/ESTIM 1ST 15 MIN: CPT | Performed by: ACUPUNCTURIST

## 2023-07-06 PROCEDURE — 1159F MED LIST DOCD IN RCRD: CPT | Mod: CPTII,S$GLB,, | Performed by: NURSE PRACTITIONER

## 2023-07-06 PROCEDURE — 99999 PR PBB SHADOW E&M-EST. PATIENT-LVL III: CPT | Mod: PBBFAC,,, | Performed by: NURSE PRACTITIONER

## 2023-07-06 PROCEDURE — 3008F PR BODY MASS INDEX (BMI) DOCUMENTED: ICD-10-PCS | Mod: CPTII,S$GLB,, | Performed by: NURSE PRACTITIONER

## 2023-07-06 PROCEDURE — 1160F PR REVIEW ALL MEDS BY PRESCRIBER/CLIN PHARMACIST DOCUMENTED: ICD-10-PCS | Mod: CPTII,S$GLB,, | Performed by: NURSE PRACTITIONER

## 2023-07-06 PROCEDURE — 3008F BODY MASS INDEX DOCD: CPT | Mod: CPTII,S$GLB,, | Performed by: NURSE PRACTITIONER

## 2023-07-06 PROCEDURE — 99999 PR PBB SHADOW E&M-EST. PATIENT-LVL III: ICD-10-PCS | Mod: PBBFAC,,, | Performed by: NURSE PRACTITIONER

## 2023-07-06 PROCEDURE — 99215 PR OFFICE/OUTPT VISIT, EST, LEVL V, 40-54 MIN: ICD-10-PCS | Mod: S$GLB,,, | Performed by: NURSE PRACTITIONER

## 2023-07-06 PROCEDURE — 3074F PR MOST RECENT SYSTOLIC BLOOD PRESSURE < 130 MM HG: ICD-10-PCS | Mod: CPTII,S$GLB,, | Performed by: NURSE PRACTITIONER

## 2023-07-06 NOTE — TELEPHONE ENCOUNTER
----- Message from Jose Thapa sent at 7/6/2023  5:00 PM CDT -----  Jose Monsalve Staff (supporting Ramiro Lake MD) 12 minutes ago (4:47 PM)      Hi there, I just want to make sure i have the right directions for bowel and bladder prep, my phone is [phone]; thank you       Responsible Party    Pool - Jaya Monsalve Staff   No one has taken responsibility for this message.

## 2023-07-06 NOTE — PROGRESS NOTES
CHIEF COMPLAINT:    Jose Fischer is a 63 y.o. male presents today ED    HISTORY OF PRESENTING ILLINESS:     Jose Fischer is a 63 y.o. male with unfavorable intermediate risk prostate adenocarcinoma, Stage IIC (cT1c, cN0, cM0, PSA: 17.7, Grade Group: 3) s/p TRUS prostate biopsy (Dr Zane Oreilly, [facility], on 02/14/2023) with 6/12 standard cores involved (left only) GS 9 =5+4 (GG5),   Of note pathology was re-read by Dr Garza at [facility] and downgraded to GS 7 = 4+3 (GG3);   PSMA negative for regional and distant mets    He has been followed by Dr. Gardner, Dr. Elizabeth and Dr. Lake.     He has chosen to ADT and XRT for management of his Prostate Cancer.     Lupron 22.5mg IM was given 04/28/2023  Has not yet begun XRT    Here today to discuss the possibility of ED with his treatment and how to prevent it.   Before the ADT he was able to achieve an erection without difficulty. Had a very good libido and sex life.  He had a Rx for Viagra not because he had to take it but because it definitely improved his current erection.    Since ADT he has decreased libido, mood changes, night sweats.   He is still able to achieve an erection. Takes Viagra 100mg. Works well. The next morning he awakes with an erection.     His concern today is impotence; what can he do or needs to be doing to prevent this from happening.           REVIEW OF SYSTEMS:  Review of Systems   Constitutional: Negative.  Negative for chills and fever.   Eyes:  Negative for double vision.   Respiratory:  Negative for cough and shortness of breath.    Cardiovascular:  Negative for chest pain.   Gastrointestinal:  Negative for abdominal pain, constipation, diarrhea, nausea and vomiting.   Genitourinary:  Positive for frequency. Negative for dysuria, flank pain and hematuria.        Ok with urination.   FOS has improved with ADT.   (+) nocturia but not bothersome.      Neurological:  Negative for dizziness and seizures.   Endo/Heme/Allergies:   Negative for polydipsia.   Psychiatric/Behavioral:  The patient is nervous/anxious.        PATIENT HISTORY:    Past Medical History:   Diagnosis Date    Depression     Prostate cancer        Past Surgical History:   Procedure Laterality Date    PROSTATE BIOPSY  02/14/2023       Family History   Problem Relation Age of Onset    Pancreatic cancer Father 53        remote hx of smoking and alcohol    Colon cancer Father 27    Other Sister         Jean Plaza 4/2022    Breast cancer Maternal Aunt 70        ER+    Prostate cancer Paternal Uncle 65        Mets to bone    Lung cancer Paternal Uncle         small cell, +smoker    Breast cancer Other        Social History     Socioeconomic History    Marital status: Single   Tobacco Use    Smoking status: Former    Smokeless tobacco: Never   Substance and Sexual Activity    Alcohol use: No    Drug use: No    Sexual activity: Not Currently     Partners: Female       Allergies:  Patient has no known allergies.    Medications:    Current Outpatient Medications:     buPROPion (WELLBUTRIN XL) 150 MG TB24 tablet, Take 1 tablet (150 mg total) by mouth once daily., Disp: 30 tablet, Rfl: 11    sildenafiL (VIAGRA) 50 MG tablet, Take 50 mg by mouth daily as needed for Erectile Dysfunction., Disp: , Rfl:     PHYSICAL EXAMINATION:  Physical Exam  Vitals and nursing note reviewed.   Constitutional:       General: He is awake.      Appearance: Normal appearance.   HENT:      Head: Normocephalic.      Right Ear: External ear normal.      Left Ear: External ear normal.      Nose: Nose normal.   Cardiovascular:      Rate and Rhythm: Normal rate.   Pulmonary:      Effort: Pulmonary effort is normal. No respiratory distress.   Abdominal:      Tenderness: There is no abdominal tenderness. There is no right CVA tenderness or left CVA tenderness.   Musculoskeletal:         General: Normal range of motion.      Cervical back: Normal range of motion.   Skin:     General: Skin is warm and dry.    Neurological:      General: No focal deficit present.      Mental Status: He is alert and oriented to person, place, and time.   Psychiatric:         Mood and Affect: Mood normal.         Behavior: Behavior is cooperative.      Comments: Anxious/concerned with ED           LABS:        Lab Results   Component Value Date    PSADIAG 18.8 (H) 04/28/2023    PSADIAG 22.1 (H) 04/04/2023       Lab Results   Component Value Date    CREATININE 0.9 04/28/2023    EGFRNORACEVR >60.0 04/28/2023             IMPRESSION:    Encounter Diagnoses   Name Primary?    Prostate cancer Yes    Drug-induced erectile dysfunction     BPH with urinary obstruction     Lower urinary tract symptoms (LUTS)          Assessment:       1. Prostate cancer    2. Drug-induced erectile dysfunction    3. BPH with urinary obstruction    4. Lower urinary tract symptoms (LUTS)        Plan:         I spent 40 minutes with the patient of which more than half was spent in direct consultation with the patient in regards to our treatment and plan.  We addressed the office findings and recent labs.   Education and recommendations of today's plan of care including home remedies and needed follow up with PCP.   We discussed the chief complaint; reviewed the LUTS and the possible contributory factors.   We discussed his risks for ED and the contributory factors.  Reassurance that successful treatments for ED during and after prostate cancer treatment are available.   It is positive that he his still getting erections after LUPRON injection.  We discussed the physiological as well as his psychological aspects that contribute to ED.  Reviewed the 1st, 2nd, & 3rd line therapies for ED.  The benefits, expectations risks, se of the different therapies.  Encouraged to take on an empty stomach 30-60 minutes prior to interaction.   He was informed that for the PAULINO we have rep that comes to clinic for him discuss.  If interested in ICI therapy, medication comes from a  compound pharmacy and the 1st dose has to be done under medical supervision.  This is done due to high risk for priapism.  Educational materials given on PAULINO and ICI therapy  Continue with PRN Viagra for now.   We will address ED issues if and when they arise.   Recommended lifestyle modifications with a proper, healthy diet, good hydration but during the day. Reducing bladder irritants.   Benefits of regular exercise.

## 2023-07-06 NOTE — PROGRESS NOTES
Acupuncture Evaluation Note     Name: Jose Fischer  [facility] Number: [id number]    Traditional Chinese Medicine (TCM) Diagnosis: Qi Stagnation, Qi Deficiency, and Liver [place]  Medical Diagnosis:   Encounter Diagnoses   Name Primary?    Low back pain, unspecified back pain laterality, unspecified chronicity, unspecified whether sciatica present Yes    Prostate cancer     Night sweats     Depression, unspecified depression type         Evaluation Date: 7/6/2023    Visit #/Visits authorized: self pay    Precautions: Standard and cancer    Subjective     Chief Concern: hot flashes / night sweats and intensified emotional responses      July 1- 4am  445 am  750am  A few around 6pm  HA resolved early evening (10am)    July 2- slept well  Woke up 3 shirts  10am HF  No headache  Hot flashes after spicy food 730am  Slept well, not much sweating  Much well rested, 5am    July 3- 5x changing clothes while sleeping  Dull headache all day   Worse in late afternoon     July 4-  bad day  Not hot flashes, pounding headache but lots of emotional responses and crying  Coming and going / mild    July 5th -   Good night sleep that night  Cyclical headache 10-11am everyday  Hot flash 11 and 12pm  Crying yesterday morning trying to get ready to go    Medical necessity is demonstrated by the following IMPAIRMENTS: Medical Necessity: Decreased mobility limits day to day activities, social, and emergent situations and Decreased quality of life              Aggravating Factors:  heat, cold, and stress   Relieving Factors:  nothing    Symptom Description:     Quality:  Hot and Variable  Severity:  10  Frequency:  continuously    Treatment     Treatment Principles:  move qi and blood, tonify yin    Acupuncture points used:  4 MENSAH, Du20, ER SHEMAR, Ht7, Pc6, Lu9 , Kd10, Ki3, Li11, Lu7, REN4, REN6, DICKERSON MEN, Sp6, Sp9, SSC, St36, and YIN PAIZ    Needles In: 28  Needles Out: 28  [place] W/ STIM placed: 11:15 AM  Needles W/ STIM removed:  11:45 AM        Assessment     After treatment, patient felt slight reduction after first visit for one night. See notes above to see trends     Patient prognosis is Good.     Patient will continue to benefit from acupuncture treatment to address the deficits listed in the problem list box on initial evaluation, provide patient family education and to maximize pt's level of independence in the home and community environment.     Patient's spiritual, cultural and educational needs considered and pt agreeable to plan of care and goals.     Anticipated barriers to treatment: none    Plan     Recommend 1 /week for 4 more sessions then re-assess.      Education:  Patient is aware of cumulative benefit of acupuncture

## 2023-07-07 ENCOUNTER — PATIENT MESSAGE (OUTPATIENT)
Dept: RADIATION ONCOLOGY | Facility: CLINIC | Age: 63
End: 2023-07-07
Payer: COMMERCIAL

## 2023-07-07 ENCOUNTER — PATIENT MESSAGE (OUTPATIENT)
Dept: UROLOGY | Facility: CLINIC | Age: 63
End: 2023-07-07
Payer: COMMERCIAL

## 2023-07-07 ENCOUNTER — PATIENT MESSAGE (OUTPATIENT)
Dept: REHABILITATION | Facility: HOSPITAL | Age: 63
End: 2023-07-07
Payer: COMMERCIAL

## 2023-07-10 ENCOUNTER — OFFICE VISIT (OUTPATIENT)
Dept: PSYCHIATRY | Facility: CLINIC | Age: 63
End: 2023-07-10
Payer: COMMERCIAL

## 2023-07-10 ENCOUNTER — PATIENT MESSAGE (OUTPATIENT)
Dept: HEMATOLOGY/ONCOLOGY | Facility: CLINIC | Age: 63
End: 2023-07-10
Payer: COMMERCIAL

## 2023-07-10 DIAGNOSIS — F43.23 ADJUSTMENT DISORDER WITH MIXED ANXIETY AND DEPRESSED MOOD: Primary | ICD-10-CM

## 2023-07-10 PROCEDURE — 90834 PSYTX W PT 45 MINUTES: CPT | Mod: S$GLB,,, | Performed by: CASE MANAGER/CARE COORDINATOR

## 2023-07-10 PROCEDURE — 90834 PR PSYCHOTHERAPY W/PATIENT, 45 MIN: ICD-10-PCS | Mod: S$GLB,,, | Performed by: CASE MANAGER/CARE COORDINATOR

## 2023-07-10 NOTE — Clinical Note
Please call patient to schedule follow-up for in about two weeks. He may do it on the portal himself.

## 2023-07-10 NOTE — PROGRESS NOTES
PSYCHO-ONCOLOGY NOTE/ Individual Psychotherapy     Date: 7/10/2023   Site:  [facility]    Name: Jose Fischer     Therapeutic Intervention: Met with patient.  Outpatient - Insight oriented psychotherapy 45 min - CPT code 00194, Outpatient - Behavior modifying psychotherapy 45 min - CPT code 35379, and Outpatient - Supportive psychotherapy 45 min - CPT Code 85810    This includes face to face time and non-face to face time preparing to see the patient, obtaining and/or reviewing separately obtained history, documenting clinical information in the electronic or other health record, independently interpreting results and communicating results to the patient/family/caregiver, or care coordinator.      Jose Fischer is a 63 y.o. White male who was last seen by me on 6/30/2023.  INFORMED CONSENT: Patient was identified using two patient-identifiers. The patient has been informed of the risks and benefits associated with engaging in psychotherapy, the handling of protected health information, the rights of privacy and the limits of confidentiality. The patient has also been informed of the importance of reporting any suicidal or homicidal ideation to this or any provider to ensure safety of all parties, and the Jose Fischer expressed understanding. The patient was agreeable to these terms and freely participates in individual psychotherapy.    Problem list  Patient Active Problem List   Diagnosis    Elevated prostate specific antigen (PSA)    BPH with urinary obstruction    Prostate cancer       Chief complaint/reason for encounter: depression, anger, anxiety, and sleep   Met with patient to evaluate psychosocial adaptation to diagnosis and treatment course of prostate cancer    Current Medications  Current Outpatient Medications   Medication    buPROPion (WELLBUTRIN XL) 150 MG TB24 tablet    sildenafiL (VIAGRA) 50 MG tablet     No current facility-administered medications for this visit.       ONCOLOGY  HISTORY  Oncology History   Prostate cancer   2/14/2023 Cancer Staged    Staging form: Prostate, AJCC 8th Edition  - Clinical stage from 2/14/2023: Stage IIC (cT2b, cN0, cM0, PSA: 17.7, Grade Group: 3)     3/14/2023 Initial Diagnosis    Prostate cancer     4/11/2023 Genetic Testing    [facility] 91-gene panel: Negative.          Objective:  Jose Fischer arrived promptly for the session.   Mr. Fischer was independently ambulatory at the time of session. The patient was fully cooperative throughout the session.  Appearance: age appropriate,  well  dressed, well groomed  Behavior/Cooperation: friendly and cooperative  Speech: normal in rate, volume, and tone and appropriate quality, quantity and organization of sentences  Mood: euthymic  Affect: mood congruent and appropriate; patient appeared anxious while describing a situation recently where he felt irritable  Thought Process: goal-directed, logical  Thought Content: normal,  No delusions or paranoia; did not appear to be responding to internal stimuli during the session  Orientation: grossly intact  Memory: grossly intact  Attention Span/Concentration: Attends to session without distraction  Fund of Knowledge: above average  Estimate of Intelligence: above average from verbal skills and history  Cognition: grossly intact  Insight: patient has awareness of illness; good insight into own behavior and behavior of others  Judgment: the patient's behavior is adequate to circumstances      Interval history and content of current session: Patient discussed events and activities since the time of last visit.  Patient shared that he completed sleep log since last session but did not bring it to session. Patient stated he tends to go to bed between 10:30 and 11:30 pm. He noted he is typically able to fall asleep fine but wakes up during night. He noted he is usually able to go back to sleep. Patient stated that he has seen sweating decreasing follow-ing acupuncture  appointments. Patient shared that he went on a social bike ride last week and enjoyed it. He noted he plans to continue doing this weekly. Discussed current adaptation to disease and treatment status. Reports to be coping with moderate difficulty. Evaluated cognitive response, paying particular attention to negative intrusive thoughts of a persistent and detrimental nature. Patient noted he has high distress at least once per day. He noted he feels irritable when needing to make decisions. Thoughts of this type are in evidence with moderate distress. Provided cognitive behavioral therapy to address negative cognitions. Patient noted that he has often had difficulties in his life with thinking other people can read his mind. Patient was encouraged to practice restructuring unhelpful thoughts when beginning to feel irritable. Patient noted he first notices his body tenses up when he feels anxious or irritable.  Examined proactive behaviors that may be implemented to minimize or ameliorate psychosocial stressors secondary to diagnosis and treatment. Patient noted that being around people less often has been helpful for him in managing his mood. Patient noted deep breathing works effectively for him when he does use it. Patient was encouraged to continue practicing deep breathing exercise on his own and to engage in exercise when he notices his body begins to tense up. Patient also plans on continuing with acupuncture and to start working with dietician.      Risk parameters:   Patient reports no suicidal ideation  Patient reports no homicidal ideation  Patient reports no self-injurious behavior  Patient reports no violent behavior  Patient marked several days for question 9 on PHQ-9, however he denied any suicidal ideation.   Safety needs:  None at this time      Verbal deficits: None     Patient's response to intervention:The patient's response to intervention is accepting, motivated.     Progress toward goals and  other mental status changes:  The patient's progress toward goals is limited at this time.      Progress to date:Progress - Ongoing, but Slow      Goals from last visit: Attempted, partially met        Patient Strengths: verbal, intelligent, successful, good social support, good insight, and commitment to wellness    Patient reported outcomes:      [facility] Distress thermometer:   DISTRESS SCREENING 7/10/2023 6/30/2023 6/16/2023 5/29/2023 4/28/2023 4/11/2023 4/11/2023   Distress Score 10 - Extreme Distress 10 - Extreme Distress 8 0 - No Distress 0 - No Distress 0 - No Distress 0 - No Distress   Practical Problems Treatment Decisions Work/School Work/School Treatment Decisions None of these - None of these   Family Problems None of these None of these None of these None of these None of these - None of these   Emotional Problems Depression;Fears;Nervousness;Sadness;Worry Depression;Fears;Sadness Depression None of these None of these - None of these   Spiritual / Sabianism Concerns No No No No No - No   Physical Problems Memory/Concentration;Pain;Sleep Memory/Concentration;Sexual;Sleep Sleep None of these None of these - None of these   Other Problems Any little difficulty or decision overwhelms me. - - - - - -           PHQ-9= Initial visit: 11    PHQ ANSWERS    Over the last 2 weeks, how often have you been bothered by any of the following problems?  Little interest or pleasure in doing things: Not at all  Feeling down, depressed, or hopeless: Nearly every day  Trouble falling or staying asleep, or sleeping too much: Nearly every day  Feeling tired or having little energy: Not at all  Poor appetite or overeating: Not at all  Feeling bad about yourself - or that you are a failure or have let yourself or your family down: Nearly every day  Trouble concentrating on things, such as reading the newspaper or watching television: Nearly every day  Moving or speaking so slowly that other people could have noticed. Or the  opposite - being so fidgety or restless that you have been moving around a lot more than usual: Not at all  Thoughts that you would be better off dead, or of hurting yourself in some way: Several days  PHQ-9 Total Score: 13    PHQ8 Score : 12 (07/10/23 1011)  PHQ-9 Total Score: 13 (07/10/23 1011)      PIPE-7= Initial visit: 6   GAD7 7/10/2023 6/30/2023   1. Feeling nervous, anxious, or on edge? 3 1   2. Not being able to stop or control worrying? 1 1   3. Worrying too much about different things? 1 0   4. Trouble relaxing? 0 0   5. Being so restless that it is hard to sit still? 1 0   6. Becoming easily annoyed or irritable? 3 3   7. Feeling afraid as if something awful might happen? 3 1   PIPE-7 Score 12 6        Treatment Plan:individual psychotherapy  Target symptoms: anxiety , irritability, and depression  Why chosen therapy is appropriate versus another modality: relevant to diagnosis, patient responds to this modality, evidence based practice  Outcome monitoring methods: self-report, observation, checklist/rating scale  Therapeutic intervention type: insight oriented psychotherapy, behavior modifying psychotherapy, supportive psychotherapy  Prognosis: Good      Behavioral goals:    Exercise: Continue engaging in riding bike as positive coping skill as is physically appropriate   Stress management: Continue practicing coping skills including when he begins to feel tense   Therapy: Restructure unhelpful thoughts    Return to clinic: 2 weeks     Length of Service (minutes direct face-to-face contact):  40    Diagnosis:     ICD-10-CM ICD-9-CM   1. Adjustment disorder with mixed anxiety and depressed mood  F43.23 309.28           Ritchie Luevano Psy.D.  Clinical Psychologist  [place] License #[id number]

## 2023-07-11 ENCOUNTER — PATIENT MESSAGE (OUTPATIENT)
Dept: HEMATOLOGY/ONCOLOGY | Facility: CLINIC | Age: 63
End: 2023-07-11
Payer: COMMERCIAL

## 2023-07-11 DIAGNOSIS — C61 PROSTATE CANCER: Primary | ICD-10-CM

## 2023-07-11 PROCEDURE — 77301 RADIOTHERAPY DOSE PLAN IMRT: CPT | Mod: TC | Performed by: STUDENT IN AN ORGANIZED HEALTH CARE EDUCATION/TRAINING PROGRAM

## 2023-07-11 PROCEDURE — 77301 RADIOTHERAPY DOSE PLAN IMRT: CPT | Mod: 26,,, | Performed by: STUDENT IN AN ORGANIZED HEALTH CARE EDUCATION/TRAINING PROGRAM

## 2023-07-11 PROCEDURE — 77301 PR  INTEN MOD RADIOTHER PLAN W/DOSE VOL HIST: ICD-10-PCS | Mod: 26,,, | Performed by: STUDENT IN AN ORGANIZED HEALTH CARE EDUCATION/TRAINING PROGRAM

## 2023-07-12 ENCOUNTER — PATIENT MESSAGE (OUTPATIENT)
Dept: HEMATOLOGY/ONCOLOGY | Facility: CLINIC | Age: 63
End: 2023-07-12
Payer: COMMERCIAL

## 2023-07-12 ENCOUNTER — TELEPHONE (OUTPATIENT)
Dept: RADIATION ONCOLOGY | Facility: CLINIC | Age: 63
End: 2023-07-12
Payer: COMMERCIAL

## 2023-07-12 PROCEDURE — 77338 PR  MLC IMRT DESIGN & CONSTRUCTION PER IMRT PLAN: ICD-10-PCS | Mod: 26,,, | Performed by: STUDENT IN AN ORGANIZED HEALTH CARE EDUCATION/TRAINING PROGRAM

## 2023-07-12 PROCEDURE — 77338 DESIGN MLC DEVICE FOR IMRT: CPT | Mod: 26,,, | Performed by: STUDENT IN AN ORGANIZED HEALTH CARE EDUCATION/TRAINING PROGRAM

## 2023-07-12 PROCEDURE — 77300 RADIATION THERAPY DOSE PLAN: CPT | Mod: 26,,, | Performed by: STUDENT IN AN ORGANIZED HEALTH CARE EDUCATION/TRAINING PROGRAM

## 2023-07-12 PROCEDURE — 77300 PR RADIATION THERAPY,DOSIMETRY PLAN: ICD-10-PCS | Mod: 26,,, | Performed by: STUDENT IN AN ORGANIZED HEALTH CARE EDUCATION/TRAINING PROGRAM

## 2023-07-12 PROCEDURE — 77300 RADIATION THERAPY DOSE PLAN: CPT | Mod: TC | Performed by: STUDENT IN AN ORGANIZED HEALTH CARE EDUCATION/TRAINING PROGRAM

## 2023-07-12 PROCEDURE — 77338 DESIGN MLC DEVICE FOR IMRT: CPT | Mod: TC | Performed by: STUDENT IN AN ORGANIZED HEALTH CARE EDUCATION/TRAINING PROGRAM
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
## 2023-07-12 NOTE — TELEPHONE ENCOUNTER
----- Message from Jacinta Miranda sent at 7/12/2023  9:20 AM CDT -----  Regarding: call back  Pt called because he has some questions about the [facility].Please give him a call at [phone]

## 2023-07-13 ENCOUNTER — CLINICAL SUPPORT (OUTPATIENT)
Dept: REHABILITATION | Facility: HOSPITAL | Age: 63
End: 2023-07-13
Payer: COMMERCIAL

## 2023-07-13 ENCOUNTER — CLINICAL SUPPORT (OUTPATIENT)
Dept: REHABILITATION | Facility: HOSPITAL | Age: 63
End: 2023-07-13
Attending: OBSTETRICS & GYNECOLOGY
Payer: COMMERCIAL

## 2023-07-13 DIAGNOSIS — F32.A DEPRESSION, UNSPECIFIED DEPRESSION TYPE: Primary | ICD-10-CM

## 2023-07-13 DIAGNOSIS — R26.89 DECREASED FUNCTIONAL MOBILITY: ICD-10-CM

## 2023-07-13 DIAGNOSIS — M25.50 JOINT PAIN: ICD-10-CM

## 2023-07-13 DIAGNOSIS — M54.50 LOW BACK PAIN, UNSPECIFIED BACK PAIN LATERALITY, UNSPECIFIED CHRONICITY, UNSPECIFIED WHETHER SCIATICA PRESENT: Primary | ICD-10-CM

## 2023-07-13 DIAGNOSIS — C61 PROSTATE CANCER: ICD-10-CM

## 2023-07-13 DIAGNOSIS — F43.29 STRESS AND ADJUSTMENT REACTION: ICD-10-CM

## 2023-07-13 DIAGNOSIS — Z78.9 ALTERATION IN INSTRUMENTAL ACTIVITIES OF DAILY LIVING (IADL): ICD-10-CM

## 2023-07-13 DIAGNOSIS — M54.50 LOW BACK PAIN: ICD-10-CM

## 2023-07-13 DIAGNOSIS — F32.A DEPRESSION: ICD-10-CM

## 2023-07-13 DIAGNOSIS — R61 NIGHT SWEATS: ICD-10-CM

## 2023-07-13 PROCEDURE — 77014 HC CT GUIDANCE RADIATION THERAPY FLDS PLACEMENT: CPT | Mod: TC | Performed by: STUDENT IN AN ORGANIZED HEALTH CARE EDUCATION/TRAINING PROGRAM

## 2023-07-13 PROCEDURE — 97811 ACUP 1/> W/O ESTIM EA ADD 15: CPT | Performed by: ACUPUNCTURIST

## 2023-07-13 PROCEDURE — 77014 PR  CT GUIDANCE PLACEMENT RAD THERAPY FIELDS: CPT | Mod: 26,,, | Performed by: STUDENT IN AN ORGANIZED HEALTH CARE EDUCATION/TRAINING PROGRAM

## 2023-07-13 PROCEDURE — 77385 HC IMRT, SIMPLE: CPT | Performed by: STUDENT IN AN ORGANIZED HEALTH CARE EDUCATION/TRAINING PROGRAM

## 2023-07-13 PROCEDURE — 97810 ACUP 1/> WO ESTIM 1ST 15 MIN: CPT | Performed by: ACUPUNCTURIST

## 2023-07-13 PROCEDURE — 77014 PR  CT GUIDANCE PLACEMENT RAD THERAPY FIELDS: ICD-10-PCS | Mod: 26,,, | Performed by: STUDENT IN AN ORGANIZED HEALTH CARE EDUCATION/TRAINING PROGRAM

## 2023-07-13 PROCEDURE — 97165 OT EVAL LOW COMPLEX 30 MIN: CPT

## 2023-07-13 PROCEDURE — 97110 THERAPEUTIC EXERCISES: CPT

## 2023-07-13 PROCEDURE — 97535 SELF CARE MNGMENT TRAINING: CPT

## 2023-07-13 NOTE — PLAN OF CARE
"  Outpatient Therapy Updated Plan of Care     Visit Date: 7/13/2023  Name: Jose Fischer  Clinic Number: [id number]    Therapy Diagnosis:   Encounter Diagnoses   Name Primary?    Joint pain     Depression     Low back pain     Depression, unspecified depression type Yes    Stress and adjustment reaction     Decreased functional mobility     Alteration in instrumental activities of daily living (IADL)      Physician: Mikaela Aiken, *    Physician Orders: Eval and Treat   Medical Diagnosis from Referral: Joint pain [M25.50], Depression [F32.A], Low back pain [M54.50]  Evaluation Date: 7/13/2023  Authorization Period Expiration: 6/15/2024  Plan of Care Expiration: 11/10/2023  Progress Note Due: 9/1/2023  Visit # / Visits authorized: 1/ 1     Current Certification Period:  7/13/23 to 11/10/23  Precautions:  Standard and cancer     Subjective     Update:   Date of onset: 3/2023 prostate cancer dx. Today is first day of radiation. "I am cool with the concept of having cancer, I wasn't surprised by it really because I have a family history of cancer, but the ADT is unbearable"   Jose reports: Biggest limiting factor is ADT. Overall sense of depression that I attribute to this hormone therapy. I have headaches that generally last all day. But that only part of the time-  if im not in a terrible mood or sobbing im generally doing quite well. Lives in [place], [place].   He has been having crying spells, depression, nocturia, night sweats and decline in overall well-being. He is a  and on break until September. Walking daily 4-5x/week with push ups along the way    Pain:  Current 7/10  Worst 9/10  Best 0/10   Location: headaches   Description: aching and throbbing, waves of unpleasantness coming out of brain and into chest   Aggravating Factors: starts at 9:30 and lasts until I go to bed  Easing Factors: Asprin, movement, distraction      Patients goals: "I want to quit having breakdowns on a daily basis. I want to " "be able to concentrate and do my job. I don't want to be so short tempered"        Objective     Patient Specific Functional Scale:         Activity 7/13/23           Emotional control  3           2. Improved concentration for job tasks 4           3. Improve mobility/strength  6           4. Stress management  2           5.             6.             SCORE  3.75            Total Score = Sum of activity scores / number of activities  Minimum Detectable Change (90% Clinical Improvement) for average score = 2 points  Minimum Detectable Change (90% Clinical Improvement) for single activity score = 3 points    Stress Response Profile:   Emotional Stress Response: "very badly. I get irate and I cant deal with things and I had to get up and walk away. I have been down here for 10 days and jodie been avoiding things and people, I cant handle decisions"  Physical Stress Response: "tense up through my arms, I start breathing real fast and I talk real fast and I get immediately upset"  Behavioral Stress Response: "avoid, leave get out, I am seeing a psychiatrist and doing breathing and some exercises that I find useful, and sometimes I do just want to go to sleep"     Self-Care: "study torah every day and I learn how to read Maltese and that's very interesting to me. 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      Sleep: "its not very good. I can go to sleep easily enough but since this hormone therapy kicked in, I have had a lot of bad night sweats. I think the acupuncture is helping with that but last night was a bad night"       Assessment     Update: Jose is a 63 y.o. male referred to outpatient Occupational Therapy with a medical diagnosis of Joint pain [M25.50], Depression [F32.A], Low back pain [M54.50]. Presents to session, with cousin, with good personal goals, good awareness of current barriers to function and with good response to all feedback and education. Patient presents " with the following impairments:       Self-Care Limitations, Deconditioning, Activity Pacing, Sleep Disturbance, Poor Stress Coping Skills, Body Mechanics, and Upper Extremity - Decreased Strength     Following medical record review, it is determined that Jose will benefit from skilled outpatient Occupational Therapy to address the impairments stated above and in the chart below in order to maximize functional activities. The following goals were discussed with the patient and patient is in agreement with them as addressed in the treatment plan. The patient's rehab potential is Good.        GOALS:  Short Term Goals: 6 weeks      Goal # Goal Status   1 Patient will be independent with Home Exercise Program to increase endurance and manage stress. Progressing   2 Patient will demonstrate independence with diaphragmatic breathing in sit and supine. Progressing   3 Patient will identify 2 new stress coping skills for stress management/immune health. Progressing   4 Patient will identify activity pacing problems.  Patient will then implement new plan for daily activity to increase endurance for ADL's. Progressing      Long Term Goals: 12 weeks      Goal # Goal Status   1 Patient will demonstrate independence with yoga Home Exercise Program to increase strength and endurance for ADL's. Progressing   2 Patient will demonstrate independence with relaxation techniques to manage stress for immune health. Progressing   3 Patient will verbalize good understanding of stress/immune health relationship.  Progressing   4          Plan     Plan of care Certification: 7/13/2023 to 11/10/2023.     Outpatient Occupational Therapy 1 times weekly for 12 weeks to include the following interventions: Manual Therapy, Neuromuscular Re-ed, Patient Education, Self Care, Therapeutic Activities, and Therapeutic Exercise.     Diane Johnson OT  7/13/2023      I CERTIFY THE NEED FOR THESE SERVICES FURNISHED UNDER THIS PLAN OF TREATMENT AND WHILE  UNDER MY CARE    Physician's comments:        Physician's Signature: ___________________________________________________

## 2023-07-14 ENCOUNTER — DOCUMENTATION ONLY (OUTPATIENT)
Dept: RADIATION ONCOLOGY | Facility: CLINIC | Age: 63
End: 2023-07-14
Payer: COMMERCIAL

## 2023-07-14 PROCEDURE — 77385 HC IMRT, SIMPLE: CPT | Performed by: STUDENT IN AN ORGANIZED HEALTH CARE EDUCATION/TRAINING PROGRAM

## 2023-07-14 PROCEDURE — 77014 HC CT GUIDANCE RADIATION THERAPY FLDS PLACEMENT: CPT | Mod: TC | Performed by: STUDENT IN AN ORGANIZED HEALTH CARE EDUCATION/TRAINING PROGRAM

## 2023-07-14 PROCEDURE — 77014 PR  CT GUIDANCE PLACEMENT RAD THERAPY FIELDS: ICD-10-PCS | Mod: 26,,, | Performed by: STUDENT IN AN ORGANIZED HEALTH CARE EDUCATION/TRAINING PROGRAM

## 2023-07-14 PROCEDURE — 77014 PR  CT GUIDANCE PLACEMENT RAD THERAPY FIELDS: CPT | Mod: 26,,, | Performed by: STUDENT IN AN ORGANIZED HEALTH CARE EDUCATION/TRAINING PROGRAM

## 2023-07-17 PROCEDURE — 77385 HC IMRT, SIMPLE: CPT | Performed by: STUDENT IN AN ORGANIZED HEALTH CARE EDUCATION/TRAINING PROGRAM

## 2023-07-17 PROCEDURE — 77014 HC CT GUIDANCE RADIATION THERAPY FLDS PLACEMENT: CPT | Mod: TC | Performed by: STUDENT IN AN ORGANIZED HEALTH CARE EDUCATION/TRAINING PROGRAM

## 2023-07-17 PROCEDURE — 77014 PR  CT GUIDANCE PLACEMENT RAD THERAPY FIELDS: ICD-10-PCS | Mod: 26,,, | Performed by: STUDENT IN AN ORGANIZED HEALTH CARE EDUCATION/TRAINING PROGRAM

## 2023-07-17 PROCEDURE — 77014 PR  CT GUIDANCE PLACEMENT RAD THERAPY FIELDS: CPT | Mod: 26,,, | Performed by: STUDENT IN AN ORGANIZED HEALTH CARE EDUCATION/TRAINING PROGRAM

## 2023-07-17 NOTE — PROGRESS NOTES
"[facility]  Occupational Therapy Treatment Note - Therapeutic Yoga Progam    Date: 7/19/2023  Name: Jose Fischer  Clinic Number: [id number]    Therapy Diagnosis:   Encounter Diagnoses   Name Primary?    Stress and adjustment reaction Yes    Decreased functional mobility     Alteration in instrumental activities of daily living (IADL)      Physician: Mikaela Aiken, *    Physician Orders: Eval and Treat   Medical Diagnosis from Referral: Joint pain [M25.50], Depression [F32.A], Low back pain [M54.50]  Evaluation Date: 7/13/2023  Authorization Period Expiration: 12/31/2023  Plan of Care Expiration: 11/10/2023  Progress Note Due: 9/1/2023  Visit # / Visits authorized: 1/ 20 (+eval)     Precautions:  Standard and cancer    Time In: 9:00 AM  Time Out: 10:00 AM  Total Billable Time: 60 minutes    SUBJECTIVE     Pt reports: "I had a bad day yesterday but I plan to have a better day today. I have radiation at 10"   He was compliant with home exercise program given last session.   Response to previous treatment: good  Functional change: TBE    Pain: 0/10  Location: na    OBJECTIVE     Objective Measures updated at progress report unless specified.    Patient Specific Functional Scale:           Activity 7/13/23           Emotional control  3           2. Improved concentration for job tasks 4           3. Improve mobility/strength  6           4. Stress management  2           5.             6.             SCORE  3.75              Total Score = Sum of activity scores / number of activities  Minimum Detectable Change (90% CII) for average score = 2 points  Minimum Detectable Change (90% CI) for single activity score = 3 points    Treatment     Jose received the treatments listed below:     Date 7/19/23        Therapeutic Yoga Exercises / Neuromuscular Re-education 45 minutes  minutes  minutes  minutes  minutes  minutes   Seated Yoga   Chair yoga: side bends, shoulder mobility, cat/cow, " "fold, backbend, twists, single knee to chest, twist, single hamstring stretch with strap and hands on asst, protraction/retraction          Quadruped         Supine Hip flexor stretch with strap + quad stretch at edge of mat,  On floor:  Windshield wipers, twist, B knees to chest, bound angle, mod happy baby        Prone Chair down dog x2 with asst        standing                           Self-Care/Home Management  / Therapeutic Activities 15 minutes  minutes  minutes  minutes  minutes  minutes            Relaxation techniques DB with tactile cues, guided 3 part breath meditation        Restorative  Z-lie on chair        Activity Pacing One breath/one action                          Stress Management/Education  Physiology of yoga and immune health                      Patient Education and Home Exercises      Education provided:   - physiology of yoga and immune health  - Progress towards goals     Written Home Exercises Provided: yes.  Exercises were reviewed and Jose was able to demonstrate them prior to the end of the session.  Jose demonstrated good  understanding of the HEP provided. See EMR under Patient Instructions for exercises provided during therapy sessions.       Assessment     Jose presents to session with report of hard day yesterday but in good spirits and with good attitude upon arrival. He reports good use of yoga HEP and with good motivation. Good tolerance to session today with focus on seated and supine poses. Difficulty with proprio/interoception but with good response and adjustments made with hands on asst. Good response to restorative practice and at end of session noted "I am so glad I found this".   Jose is progressing well towards his goals and there are no updates to goals at this time. Pt prognosis is Good.     Pt will continue to benefit from skilled outpatient occupational therapy to address the deficits listed in the problem list on initial evaluation provide pt/family education and " to maximize pt's level of independence in the home and community environment. Pt's spiritual, cultural and educational needs considered and pt agreeable to plan of care and goals.    Anticipated barriers to occupational therapy: none    Goals:  Short Term Goals: 6 weeks      Goal # Goal Status   1 Patient will be independent with Home Exercise Program to increase endurance and manage stress. Progressing   2 Patient will demonstrate independence with diaphragmatic breathing in sit and supine. Progressing   3 Patient will identify 2 new stress coping skills for stress management/immune health. Progressing   4 Patient will identify activity pacing problems.  Patient will then implement new plan for daily activity to increase endurance for ADL's. Progressing      Long Term Goals: 12 weeks      Goal # Goal Status   1 Patient will demonstrate independence with yoga Home Exercise Program to increase strength and endurance for ADL's. Progressing   2 Patient will demonstrate independence with relaxation techniques to manage stress for immune health. Progressing   3 Patient will verbalize good understanding of stress/immune health relationship.  Progressing   4            PLAN     Plan of care Certification: 7/13/2023 to 11/10/2023.     Outpatient Occupational Therapy 1 times weekly for 12 weeks to include the following interventions: Manual Therapy, Neuromuscular Re-ed, Patient Education, Self Care, Therapeutic Activities, and Therapeutic Exercise.     Diane Johnson, OT

## 2023-07-17 NOTE — PROGRESS NOTES
Acupuncture Evaluation Note     Name: Jose Fischer  [facility] Number: [id number]    Traditional Chinese Medicine (TCM) Diagnosis: Qi Stagnation and Blood Stasis  Medical Diagnosis:   Encounter Diagnoses   Name Primary?    Low back pain, unspecified back pain laterality, unspecified chronicity, unspecified whether sciatica present Yes    Night sweats     Prostate cancer         Evaluation Date: 07/13/2023    Visit #/Visits authorized: self pay    Precautions: Standard and cancer    Subjective     Chief Concern: hot flashes / night sweats, emotional disturbances     Medical necessity is demonstrated by the following IMPAIRMENTS: Medical Necessity: Decreased mobility limits day to day activities, social, and emergent situations and Decreased quality of life              Aggravating Factors:  heat and stress   Relieving Factors:  nothing    Symptom Description:     Quality:  Hot  Severity:  7  Frequency:  continuously    Treatment     Treatment Principles:  tonify yin, reduce heat, calm dickerson    Acupuncture points used:  4 MENSAH, Du20, ER SHEMAR, Ht7, Pc6, Lu9 , Kd10, Ki3, Li11, Lu7, REN4, REN6, DICKERSON MEN, Sp6, Sp9, SSC, St36, and YIN PAIZ      Needles In: 24  Needles Out: 24  Needles W/O STIM placed: 11:10 AM  Parshall W/O STIM removed: 11:40 AM        Assessment     After treatment, patient felt overall      Patient prognosis is Good.     Patient will continue to benefit from acupuncture treatment to address the deficits listed in the problem list box on initial evaluation, provide patient family education and to maximize pt's level of independence in the home and community environment.     Patient's spiritual, cultural and educational needs considered and pt agreeable to plan of care and goals.     Anticipated barriers to treatment: none / radiation    Plan     Recommend 1 /week for 3-4 sessions then re-assess.      Education:  Patient is aware of cumulative benefit of acupuncture

## 2023-07-18 PROCEDURE — 77014 PR  CT GUIDANCE PLACEMENT RAD THERAPY FIELDS: CPT | Mod: 26,,, | Performed by: STUDENT IN AN ORGANIZED HEALTH CARE EDUCATION/TRAINING PROGRAM

## 2023-07-18 PROCEDURE — 77385 HC IMRT, SIMPLE: CPT | Performed by: STUDENT IN AN ORGANIZED HEALTH CARE EDUCATION/TRAINING PROGRAM

## 2023-07-18 PROCEDURE — 77014 HC CT GUIDANCE RADIATION THERAPY FLDS PLACEMENT: CPT | Mod: TC | Performed by: STUDENT IN AN ORGANIZED HEALTH CARE EDUCATION/TRAINING PROGRAM

## 2023-07-18 PROCEDURE — 77014 PR  CT GUIDANCE PLACEMENT RAD THERAPY FIELDS: ICD-10-PCS | Mod: 26,,, | Performed by: STUDENT IN AN ORGANIZED HEALTH CARE EDUCATION/TRAINING PROGRAM

## 2023-07-19 ENCOUNTER — CLINICAL SUPPORT (OUTPATIENT)
Dept: REHABILITATION | Facility: HOSPITAL | Age: 63
End: 2023-07-19
Payer: COMMERCIAL

## 2023-07-19 DIAGNOSIS — F43.29 STRESS AND ADJUSTMENT REACTION: Primary | ICD-10-CM

## 2023-07-19 DIAGNOSIS — Z78.9 ALTERATION IN INSTRUMENTAL ACTIVITIES OF DAILY LIVING (IADL): ICD-10-CM

## 2023-07-19 DIAGNOSIS — R26.89 DECREASED FUNCTIONAL MOBILITY: ICD-10-CM

## 2023-07-19 PROCEDURE — 77014 PR  CT GUIDANCE PLACEMENT RAD THERAPY FIELDS: ICD-10-PCS | Mod: 26,,, | Performed by: STUDENT IN AN ORGANIZED HEALTH CARE EDUCATION/TRAINING PROGRAM

## 2023-07-19 PROCEDURE — 77014 PR  CT GUIDANCE PLACEMENT RAD THERAPY FIELDS: CPT | Mod: 26,,, | Performed by: STUDENT IN AN ORGANIZED HEALTH CARE EDUCATION/TRAINING PROGRAM

## 2023-07-19 PROCEDURE — 97112 NEUROMUSCULAR REEDUCATION: CPT

## 2023-07-19 PROCEDURE — 97110 THERAPEUTIC EXERCISES: CPT

## 2023-07-19 PROCEDURE — 77014 HC CT GUIDANCE RADIATION THERAPY FLDS PLACEMENT: CPT | Mod: TC | Performed by: STUDENT IN AN ORGANIZED HEALTH CARE EDUCATION/TRAINING PROGRAM

## 2023-07-19 PROCEDURE — 97535 SELF CARE MNGMENT TRAINING: CPT

## 2023-07-19 PROCEDURE — 77385 HC IMRT, SIMPLE: CPT | Performed by: STUDENT IN AN ORGANIZED HEALTH CARE EDUCATION/TRAINING PROGRAM

## 2023-07-19 PROCEDURE — 77336 RADIATION PHYSICS CONSULT: CPT | Performed by: STUDENT IN AN ORGANIZED HEALTH CARE EDUCATION/TRAINING PROGRAM

## 2023-07-20 ENCOUNTER — CLINICAL SUPPORT (OUTPATIENT)
Dept: REHABILITATION | Facility: HOSPITAL | Age: 63
End: 2023-07-20
Payer: COMMERCIAL

## 2023-07-20 ENCOUNTER — PATIENT MESSAGE (OUTPATIENT)
Dept: HEMATOLOGY/ONCOLOGY | Facility: CLINIC | Age: 63
End: 2023-07-20

## 2023-07-20 DIAGNOSIS — C61 PROSTATE CANCER: ICD-10-CM

## 2023-07-20 DIAGNOSIS — G43.909 MIGRAINE WITHOUT STATUS MIGRAINOSUS, NOT INTRACTABLE, UNSPECIFIED MIGRAINE TYPE: ICD-10-CM

## 2023-07-20 DIAGNOSIS — Z79.818 ANDROGEN DEPRIVATION THERAPY: Primary | ICD-10-CM

## 2023-07-20 DIAGNOSIS — F32.A DEPRESSION, UNSPECIFIED DEPRESSION TYPE: ICD-10-CM

## 2023-07-20 DIAGNOSIS — R61 NIGHT SWEATS: ICD-10-CM

## 2023-07-20 PROCEDURE — 77014 HC CT GUIDANCE RADIATION THERAPY FLDS PLACEMENT: CPT | Mod: TC | Performed by: STUDENT IN AN ORGANIZED HEALTH CARE EDUCATION/TRAINING PROGRAM

## 2023-07-20 PROCEDURE — 77014 PR  CT GUIDANCE PLACEMENT RAD THERAPY FIELDS: CPT | Mod: 26,,, | Performed by: STUDENT IN AN ORGANIZED HEALTH CARE EDUCATION/TRAINING PROGRAM

## 2023-07-20 PROCEDURE — 77014 PR  CT GUIDANCE PLACEMENT RAD THERAPY FIELDS: ICD-10-PCS | Mod: 26,,, | Performed by: STUDENT IN AN ORGANIZED HEALTH CARE EDUCATION/TRAINING PROGRAM

## 2023-07-20 PROCEDURE — 77385 HC IMRT, SIMPLE: CPT | Performed by: STUDENT IN AN ORGANIZED HEALTH CARE EDUCATION/TRAINING PROGRAM

## 2023-07-20 PROCEDURE — 97814 ACUP 1/> W/ESTIM EA ADDL 15: CPT | Performed by: ACUPUNCTURIST

## 2023-07-20 PROCEDURE — 97813 ACUP 1/> W/ESTIM 1ST 15 MIN: CPT | Performed by: ACUPUNCTURIST

## 2023-07-21 ENCOUNTER — DOCUMENTATION ONLY (OUTPATIENT)
Dept: RADIATION ONCOLOGY | Facility: CLINIC | Age: 63
End: 2023-07-21
Payer: COMMERCIAL

## 2023-07-21 ENCOUNTER — OFFICE VISIT (OUTPATIENT)
Dept: HEMATOLOGY/ONCOLOGY | Facility: CLINIC | Age: 63
End: 2023-07-21
Payer: COMMERCIAL

## 2023-07-21 VITALS
SYSTOLIC BLOOD PRESSURE: 107 MMHG | HEIGHT: 72 IN | WEIGHT: 145.94 LBS | OXYGEN SATURATION: 98 % | HEART RATE: 56 BPM | BODY MASS INDEX: 19.77 KG/M2 | DIASTOLIC BLOOD PRESSURE: 74 MMHG

## 2023-07-21 DIAGNOSIS — F43.29 STRESS AND ADJUSTMENT REACTION: ICD-10-CM

## 2023-07-21 DIAGNOSIS — C61 PROSTATE CANCER: Primary | ICD-10-CM

## 2023-07-21 PROCEDURE — 99999 PR PBB SHADOW E&M-EST. PATIENT-LVL III: ICD-10-PCS | Mod: PBBFAC,,, | Performed by: OBSTETRICS & GYNECOLOGY

## 2023-07-21 PROCEDURE — 3078F DIAST BP <80 MM HG: CPT | Mod: CPTII,S$GLB,, | Performed by: OBSTETRICS & GYNECOLOGY

## 2023-07-21 PROCEDURE — 99214 PR OFFICE/OUTPT VISIT, EST, LEVL IV, 30-39 MIN: ICD-10-PCS | Mod: S$GLB,,, | Performed by: OBSTETRICS & GYNECOLOGY

## 2023-07-21 PROCEDURE — 77385 HC IMRT, SIMPLE: CPT | Performed by: STUDENT IN AN ORGANIZED HEALTH CARE EDUCATION/TRAINING PROGRAM

## 2023-07-21 PROCEDURE — 99999 PR PBB SHADOW E&M-EST. PATIENT-LVL III: CPT | Mod: PBBFAC,,, | Performed by: OBSTETRICS & GYNECOLOGY

## 2023-07-21 PROCEDURE — 3008F PR BODY MASS INDEX (BMI) DOCUMENTED: ICD-10-PCS | Mod: CPTII,S$GLB,, | Performed by: OBSTETRICS & GYNECOLOGY

## 2023-07-21 PROCEDURE — 77014 HC CT GUIDANCE RADIATION THERAPY FLDS PLACEMENT: CPT | Mod: TC | Performed by: STUDENT IN AN ORGANIZED HEALTH CARE EDUCATION/TRAINING PROGRAM

## 2023-07-21 PROCEDURE — 99214 OFFICE O/P EST MOD 30 MIN: CPT | Mod: S$GLB,,, | Performed by: OBSTETRICS & GYNECOLOGY

## 2023-07-21 PROCEDURE — 3008F BODY MASS INDEX DOCD: CPT | Mod: CPTII,S$GLB,, | Performed by: OBSTETRICS & GYNECOLOGY

## 2023-07-21 PROCEDURE — 77014 PR  CT GUIDANCE PLACEMENT RAD THERAPY FIELDS: ICD-10-PCS | Mod: 26,,, | Performed by: STUDENT IN AN ORGANIZED HEALTH CARE EDUCATION/TRAINING PROGRAM

## 2023-07-21 PROCEDURE — 3074F PR MOST RECENT SYSTOLIC BLOOD PRESSURE < 130 MM HG: ICD-10-PCS | Mod: CPTII,S$GLB,, | Performed by: OBSTETRICS & GYNECOLOGY

## 2023-07-21 PROCEDURE — 1159F PR MEDICATION LIST DOCUMENTED IN MEDICAL RECORD: ICD-10-PCS | Mod: CPTII,S$GLB,, | Performed by: OBSTETRICS & GYNECOLOGY

## 2023-07-21 PROCEDURE — 3078F PR MOST RECENT DIASTOLIC BLOOD PRESSURE < 80 MM HG: ICD-10-PCS | Mod: CPTII,S$GLB,, | Performed by: OBSTETRICS & GYNECOLOGY

## 2023-07-21 PROCEDURE — 1159F MED LIST DOCD IN RCRD: CPT | Mod: CPTII,S$GLB,, | Performed by: OBSTETRICS & GYNECOLOGY

## 2023-07-21 PROCEDURE — 77014 PR  CT GUIDANCE PLACEMENT RAD THERAPY FIELDS: CPT | Mod: 26,,, | Performed by: STUDENT IN AN ORGANIZED HEALTH CARE EDUCATION/TRAINING PROGRAM

## 2023-07-21 PROCEDURE — 3074F SYST BP LT 130 MM HG: CPT | Mod: CPTII,S$GLB,, | Performed by: OBSTETRICS & GYNECOLOGY

## 2023-07-21 RX ORDER — TAMSULOSIN HYDROCHLORIDE 0.4 MG/1
0.4 CAPSULE ORAL NIGHTLY
Qty: 90 CAPSULE | Refills: 3 | Status: SHIPPED | OUTPATIENT
Start: 2023-07-21 | End: 2024-07-20

## 2023-07-21 NOTE — PROGRESS NOTES
Integrative Health and Medicine Initial Visit      Chief Complaint:  ADT is difficult    Patient is a 62 yo  male with Prostate Cancer diagnosed March 14, 2023. He is actively receiving concurrent radiation with ADT.     Patient notes depression, cognitive decline and frequent, sporadic crying spells. He is taking Wellbutrin. He notes no specific triggers for crying spells. Uses exercise to cope with his emotions. 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 Struggles with mental sharpness, interrogation and word finding.     Recently started OT Yoga and acupuncture. [employer] Program for daily exercise while in the [place] area for active radiation therapy. Seeing Dr. Carcamo for support with depression and coping. Acupuncture has helped with hot flashes and night sweats- less frequent and less intense    Saw Skye Chen NP for sexual wellness and counseling related to fears of ED and loss of sex life.He does not have ED    Cancer/Stage/TNM:   Cancer Staging   Prostate cancer  Staging form: Prostate, AJCC 8th Edition  - Clinical stage from 2/14/2023: Stage IIC (cT2b, cN0, cM0, PSA: 17.7, Grade Group: 3) - Signed by Patricia Willson NP on 4/28/2023       Oncology History   Prostate cancer   2/14/2023 Cancer Staged    Staging form: Prostate, AJCC 8th Edition  - Clinical stage from 2/14/2023: Stage IIC (cT2b, cN0, cM0, PSA: 17.7, Grade Group: 3)     3/14/2023 Initial Diagnosis    Prostate cancer     4/11/2023 Genetic Testing    [facility] 91-gene panel: Negative.            Past Medical History:   Diagnosis Date    Depression     Prostate cancer         Current Outpatient Medications   Medication Instructions    buPROPion (WELLBUTRIN XL) 150 mg, Oral, Daily    sildenafiL (VIAGRA) 50 mg, Oral, Daily PRN        Past Surgical History:   Procedure Laterality Date    PROSTATE BIOPSY  02/14/2023          Distress Score: ranges from 1-10       Today the patient described the following:    Exercise: more than 30  minutes per day  Sleep: less than 6 hours           Exercise  How would you describe your physical activity level? 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 Daily exercise, walk, ride bikes      Physical Exam   /74 weight 145 pounds height 6 feet  Wt Readings from Last 3 Encounters:   07/06/23 65.8 kg (145 lb)   04/28/23 71.5 kg (157 lb 10.1 oz)   04/11/23 69.1 kg (152 lb 4.8 oz)     Temp Readings from Last 3 Encounters:   04/28/23 97.9 °F (36.6 °C) (Oral)   03/14/23 97.7 °F (36.5 °C) (Oral)   09/30/16 98.2 °F (36.8 °C) (Oral)     BP Readings from Last 3 Encounters:   07/06/23 108/62   04/28/23 102/62   04/11/23 (!) 101/58     Pulse Readings from Last 3 Encounters:   07/06/23 60   04/28/23 (!) 57   04/11/23 (!) 49      ./74 (BP Location: Right arm, Patient Position: Sitting)   Pulse (!) 56   Ht 6' (1.829 m)   Wt 66.2 kg (145 lb 15.1 oz)   SpO2 98%   BMI 19.79 kg/m²       Vitals reviewed.     Constitutional:       General: Patient is not in acute distress.     Appearance: Normal appearance.     Psychiatric:         Mood and Affect: Mood normal.         Behavior: Behavior normal.         Thought Content: Thought content normal.         Judgment: Judgment normal.      Review of Systems:   Cardiac:           No SOB, chest pain with exertion,edema, orthopnea  Distress:          No excessive sadness, no hopelessness, no anhedonia, no excessive worry or nervousness  Cognitive:        No trouble with memory,+ difficulty paying attention, no brain fog, + trouble functioning with work or home life  Fatigue:           Energy level adequate, performing ADL's, no morning fatigue                           Fatigue  3 / 10  ( Scale 0 - 10)   Hormonal:       No hot flashes, + night sweats  Pain:                Has no pain,  location:                          Pain 0   / 10 (Scale 0 - 10)    Neuropathy:    No numbness, no tingling, no paresthesia   Sleep:              +  difficulty falling asleep, + waking up in night, no daytime sleepiness, no snoring  Altered function:          No problems with money management,  no problems with daily organization & planning  Weight:           No concerns with weight, wants to lose a little and maintain healthy        Labs:   Lab Results   Component Value Date    WBC 5.71 04/28/2023    HGB 13.2 (L) 04/28/2023    HCT 41.0 04/28/2023    MCV 89 04/28/2023     04/28/2023           No results found for: HGBA1C   T3,T4,T7 and TSH   Vitamin d level  Vitamin b-12       Assessment:   Prostate Cancer  Depression       Plan   Nutrition: Continue to encourage plant based diet; discussed anti-inflammatory diet as well as foods to decrease effects of diabetes and prediabetes.   Sleep: Recommend 6-8 hours of restful sleep nightly; recommend strong sleep hygiene routine one hour prior to bed. Discussed relaxation, and guided imagery prior to bed.   Mind Body Medicine: Continue Deep breathing exercise, yoga, and focus on gratitude   Exercise: Recommend 60 minutes of gentle movement/light activity per day (cleaning, walking, cooking, gardening, stationary bike). Recommend some type of cardiovascular activity daily if well.   Recommend a positive thought process through affirmations and visualizations.  Continue Acupuncture   Continue OT and MedFit Program.  Schedule with Dietitian for risk reduction, reducing Metabolic Syndrome on ADT.  Continue with Psychology for anxiety, depression, situational adjustment disorder        Follow-Up: prn    Total time 35 minutes- face to face, review of medical record and arranging follow up  Message sent to Dr. Elizabeth regarding headaches

## 2023-07-24 PROCEDURE — 77014 PR  CT GUIDANCE PLACEMENT RAD THERAPY FIELDS: CPT | Mod: 26,,, | Performed by: STUDENT IN AN ORGANIZED HEALTH CARE EDUCATION/TRAINING PROGRAM

## 2023-07-24 PROCEDURE — 77385 HC IMRT, SIMPLE: CPT | Performed by: STUDENT IN AN ORGANIZED HEALTH CARE EDUCATION/TRAINING PROGRAM

## 2023-07-24 PROCEDURE — 77014 PR  CT GUIDANCE PLACEMENT RAD THERAPY FIELDS: ICD-10-PCS | Mod: 26,,, | Performed by: STUDENT IN AN ORGANIZED HEALTH CARE EDUCATION/TRAINING PROGRAM

## 2023-07-24 NOTE — PROGRESS NOTES
The patient location is: [place]   The chief complaint leading to consultation is: new prostate cancer     Visit type: audiovisual    Face to Face time with patient: 25 minutes   40 minutes of total time spent on the encounter, which includes face to face time and non-face to face time preparing to see the patient (eg, review of tests), Obtaining and/or reviewing separately obtained history, Documenting clinical information in the electronic or other health record, Independently interpreting results (not separately reported) and communicating results to the patient/family/caregiver, or Care coordination (not separately reported).     Each patient to whom he or she provides medical services by telemedicine is:  (1) informed of the relationship between the physician and patient and the respective role of any other health care provider with respect to management of the patient; and (2) notified that he or she may decline to receive medical services by telemedicine and may withdraw from such care at any time.    Oncology Nutrition Assessment for Medical Nutrition Therapy  Initial Visit    Jose Fischer   1960    Referring Provider:  Mikaela Aiken, *      Reason for Visit: Pt in for education and nutrition counseling     PMHx:   Past Medical History:   Diagnosis Date    Depression     Prostate cancer        Nutrition Assessment    This is a 63 y.o.male with a recent diagnosis of prostate cancer. He is actively receiving concurrent radiation with ADT. Referred to nutrition as part of integrative program.   He has been following a healthy diet prior to diagnosis. Avoids pork due to Anab[employer]ism heritage and this has kept processed meats out of his diet. He eats fish, tofu, beans, nuts for protein. Drinks water and coffee. No alcohol. He started running when he was diagnosed with cancer and lost about 10lb from the increased activity. Has not noticed any muscle losses. He continues to brisk walk/jog 7 miles per  day or bikes up to 15 miles. Has had hot flashes with ADT. Acupuncture is helping.     Weight:66.2 kg (146 lb)  Height:6' (1.829 m)  BMI:Body mass index is 19.8 kg/m².   IBW: Ideal body weight: 77.6 kg (171 lb 1.2 oz)    Usual BW: 155lb  Weight Change:about 10lb loss from increased physical activity     Allergies: Patient has no known allergies.    Current Medications:    Current Outpatient Medications:     buPROPion (WELLBUTRIN XL) 150 MG TB24 tablet, Take 1 tablet (150 mg total) by mouth once daily., Disp: 30 tablet, Rfl: 11    sildenafiL (VIAGRA) 50 MG tablet, Take 50 mg by mouth daily as needed for Erectile Dysfunction., Disp: , Rfl:     tamsulosin (FLOMAX) 0.4 mg Cap, Take 1 capsule (0.4 mg total) by mouth nightly., Disp: 90 capsule, Rfl: 3    Vitamins/Supplements: vitamin B3 (Niacin)     Labs: Reviewed from 4/28    Nutrition Diagnosis    Problem: nutrition related knowledge deficit   Etiology (related to): lack of prior need for nutrition education  Signs/Symptoms (as evidenced by):  new cancer diagnosis     Nutrition Intervention    Nutrition Prescription   8574-2308 Kcals (25-30kcal/kg)  79 g protein (1.2g/kg)   2000 mL fluid (30mL/kg)    Recommendations:  Reviewed diet components that may help prostate cancer survival   -continue to eat soy- can help with hot flashes as well  -omega 3 supplement or fatty fish 2-3 days per week  -limit eggs, especially egg yolks   -eat dark orange and dark red fruits and vegetables for lycopene   -focus on plant fats/oils like olives, nuts, seeds, olive oils, avocado   Avoid excess calcium intake   Continue to avoid alcohol   Avoid added sugars as much as possible   Continue acupuncture     Materials Provided/Reviewed   Nutrition for Prostate Cancer ([facility])    Nutrition Monitoring and Evaluation    Monitor: energy intake, diet tolerance , and diet education needs     Goals: continue to focus on high quality plant-based diet     Follow up PRN- follow up through portal or email  with questions     Communication to referring provider/care team: note available in chart     Counseling time: 30 Minutes    Olga Henry, MPH, RD, , LDN, FAND   [phone]

## 2023-07-25 ENCOUNTER — TELEPHONE (OUTPATIENT)
Dept: HEMATOLOGY/ONCOLOGY | Facility: CLINIC | Age: 63
End: 2023-07-25

## 2023-07-25 ENCOUNTER — PATIENT MESSAGE (OUTPATIENT)
Dept: HEMATOLOGY/ONCOLOGY | Facility: CLINIC | Age: 63
End: 2023-07-25

## 2023-07-25 ENCOUNTER — CLINICAL SUPPORT (OUTPATIENT)
Dept: HEMATOLOGY/ONCOLOGY | Facility: CLINIC | Age: 63
End: 2023-07-25
Payer: COMMERCIAL

## 2023-07-25 ENCOUNTER — DOCUMENTATION ONLY (OUTPATIENT)
Dept: HEMATOLOGY/ONCOLOGY | Facility: CLINIC | Age: 63
End: 2023-07-25
Payer: COMMERCIAL

## 2023-07-25 VITALS — BODY MASS INDEX: 19.77 KG/M2 | WEIGHT: 146 LBS | HEIGHT: 72 IN

## 2023-07-25 DIAGNOSIS — Z79.818 ANDROGEN DEPRIVATION THERAPY: ICD-10-CM

## 2023-07-25 DIAGNOSIS — Z71.3 NUTRITIONAL COUNSELING: Primary | ICD-10-CM

## 2023-07-25 DIAGNOSIS — C61 PROSTATE CANCER: ICD-10-CM

## 2023-07-25 PROCEDURE — 77014 PR  CT GUIDANCE PLACEMENT RAD THERAPY FIELDS: ICD-10-PCS | Mod: 26,,, | Performed by: STUDENT IN AN ORGANIZED HEALTH CARE EDUCATION/TRAINING PROGRAM

## 2023-07-25 PROCEDURE — 97802 PR MED NUTR THER, 1ST, INDIV, EA 15 MIN: ICD-10-PCS | Mod: 95,,, | Performed by: DIETITIAN, REGISTERED

## 2023-07-25 PROCEDURE — 97802 MEDICAL NUTRITION INDIV IN: CPT | Mod: 95,,, | Performed by: DIETITIAN, REGISTERED

## 2023-07-25 PROCEDURE — 77014 PR  CT GUIDANCE PLACEMENT RAD THERAPY FIELDS: CPT | Mod: 26,,, | Performed by: STUDENT IN AN ORGANIZED HEALTH CARE EDUCATION/TRAINING PROGRAM

## 2023-07-25 PROCEDURE — 77385 HC IMRT, SIMPLE: CPT | Performed by: STUDENT IN AN ORGANIZED HEALTH CARE EDUCATION/TRAINING PROGRAM

## 2023-07-25 NOTE — TELEPHONE ENCOUNTER
----- Message from Eleanor Stauffer LCSW sent at 7/25/2023  3:58 PM CDT -----  Regarding: Patient Requests to Attend Tues. evening Yoga  Patient is interested in attending yoga class Tues evenings, starting today. Please call him as soon as possible at [phone].

## 2023-07-25 NOTE — PROGRESS NOTES
Received call this afternoon from patient ([phone] cell.) who is inquiring who to contact about the yoga classes as he would like to attend the Tuesday evening classes starting today. Confirmed that the  is Ruthy Hanna MA. Sent an In Basket message to her in Epic regarding this and asked her to call patient as soon as possible. Called patient back and left a detailed voice mail message for him.    Will continue to follow and assist as needs are identified.

## 2023-07-26 PROCEDURE — 77014 PR  CT GUIDANCE PLACEMENT RAD THERAPY FIELDS: ICD-10-PCS | Mod: 26,,, | Performed by: STUDENT IN AN ORGANIZED HEALTH CARE EDUCATION/TRAINING PROGRAM

## 2023-07-26 PROCEDURE — 77336 RADIATION PHYSICS CONSULT: CPT | Performed by: STUDENT IN AN ORGANIZED HEALTH CARE EDUCATION/TRAINING PROGRAM

## 2023-07-26 PROCEDURE — 77385 HC IMRT, SIMPLE: CPT | Performed by: STUDENT IN AN ORGANIZED HEALTH CARE EDUCATION/TRAINING PROGRAM

## 2023-07-26 PROCEDURE — 77014 PR  CT GUIDANCE PLACEMENT RAD THERAPY FIELDS: CPT | Mod: 26,,, | Performed by: STUDENT IN AN ORGANIZED HEALTH CARE EDUCATION/TRAINING PROGRAM

## 2023-07-27 ENCOUNTER — CLINICAL SUPPORT (OUTPATIENT)
Dept: REHABILITATION | Facility: HOSPITAL | Age: 63
End: 2023-07-27
Payer: COMMERCIAL

## 2023-07-27 ENCOUNTER — OFFICE VISIT (OUTPATIENT)
Dept: PSYCHIATRY | Facility: CLINIC | Age: 63
End: 2023-07-27
Payer: COMMERCIAL

## 2023-07-27 DIAGNOSIS — F43.23 ADJUSTMENT DISORDER WITH MIXED ANXIETY AND DEPRESSED MOOD: Primary | ICD-10-CM

## 2023-07-27 DIAGNOSIS — R61 NIGHT SWEATS: ICD-10-CM

## 2023-07-27 DIAGNOSIS — M54.50 LOW BACK PAIN, UNSPECIFIED BACK PAIN LATERALITY, UNSPECIFIED CHRONICITY, UNSPECIFIED WHETHER SCIATICA PRESENT: Primary | ICD-10-CM

## 2023-07-27 DIAGNOSIS — F43.29 STRESS AND ADJUSTMENT REACTION: ICD-10-CM

## 2023-07-27 PROCEDURE — 77385 HC IMRT, SIMPLE: CPT | Performed by: STUDENT IN AN ORGANIZED HEALTH CARE EDUCATION/TRAINING PROGRAM

## 2023-07-27 PROCEDURE — 97813 ACUP 1/> W/ESTIM 1ST 15 MIN: CPT | Performed by: ACUPUNCTURIST

## 2023-07-27 PROCEDURE — 77427 RADIATION TX MANAGEMENT X5: CPT | Mod: ,,, | Performed by: STUDENT IN AN ORGANIZED HEALTH CARE EDUCATION/TRAINING PROGRAM

## 2023-07-27 PROCEDURE — 77427 PR CHG RADIATION,MANGEMENT,5 TX'S: ICD-10-PCS | Mod: ,,, | Performed by: STUDENT IN AN ORGANIZED HEALTH CARE EDUCATION/TRAINING PROGRAM

## 2023-07-27 PROCEDURE — 90834 PR PSYCHOTHERAPY W/PATIENT, 45 MIN: ICD-10-PCS | Mod: S$GLB,,, | Performed by: CASE MANAGER/CARE COORDINATOR

## 2023-07-27 PROCEDURE — 90834 PSYTX W PT 45 MINUTES: CPT | Mod: S$GLB,,, | Performed by: CASE MANAGER/CARE COORDINATOR

## 2023-07-27 PROCEDURE — 97814 ACUP 1/> W/ESTIM EA ADDL 15: CPT | Performed by: ACUPUNCTURIST

## 2023-07-27 PROCEDURE — 77014 PR  CT GUIDANCE PLACEMENT RAD THERAPY FIELDS: CPT | Mod: 26,,, | Performed by: STUDENT IN AN ORGANIZED HEALTH CARE EDUCATION/TRAINING PROGRAM

## 2023-07-27 PROCEDURE — 77014 PR  CT GUIDANCE PLACEMENT RAD THERAPY FIELDS: ICD-10-PCS | Mod: 26,,, | Performed by: STUDENT IN AN ORGANIZED HEALTH CARE EDUCATION/TRAINING PROGRAM

## 2023-07-27 NOTE — PROGRESS NOTES
PSYCHO-ONCOLOGY NOTE/ Individual Psychotherapy     Date: 7/27/2023   Site:  [facility]    Name: Jose Fischer     Therapeutic Intervention: Met with patient.  Outpatient - Insight oriented psychotherapy 45 min - CPT code 91685, Outpatient - Behavior modifying psychotherapy 45 min - CPT code 58636, and Outpatient - Supportive psychotherapy 45 min - CPT Code 12128    This includes face to face time and non-face to face time preparing to see the patient, obtaining and/or reviewing separately obtained history, documenting clinical information in the electronic or other health record, independently interpreting results and communicating results to the patient/family/caregiver, or care coordinator.      Jose Fischer is a 63 y.o. White male who was last seen by me on 7/10/2023.  INFORMED CONSENT: Patient was identified using two patient-identifiers. The patient has been informed of the risks and benefits associated with engaging in psychotherapy, the handling of protected health information, the rights of privacy and the limits of confidentiality. The patient has also been informed of the importance of reporting any suicidal or homicidal ideation to this or any provider to ensure safety of all parties, and the Jose Fischer expressed understanding. The patient was agreeable to these terms and freely participates in individual psychotherapy.    Problem list  Patient Active Problem List   Diagnosis    Elevated prostate specific antigen (PSA)    BPH with urinary obstruction    Prostate cancer    Stress and adjustment reaction    Decreased functional mobility    Alteration in instrumental activities of daily living (IADL)       Chief complaint/reason for encounter: depression, anger, and anxiety   Met with patient to evaluate psychosocial adaptation to diagnosis and treatment course of prostate cancer.    Current Medications  Current Outpatient Medications   Medication    buPROPion (WELLBUTRIN XL) 150 MG TB24  tablet    sildenafiL (VIAGRA) 50 MG tablet    tamsulosin (FLOMAX) 0.4 mg Cap     No current facility-administered medications for this visit.       ONCOLOGY HISTORY  Oncology History   Prostate cancer   2/14/2023 Cancer Staged    Staging form: Prostate, AJCC 8th Edition  - Clinical stage from 2/14/2023: Stage IIC (cT2b, cN0, cM0, PSA: 17.7, Grade Group: 3)     3/14/2023 Initial Diagnosis    Prostate cancer     4/11/2023 Genetic Testing    [facility] 91-gene panel: Negative.          Objective:  Jose Fischer arrived  promptly for the session.   Mr. Fischer was independently ambulatory at the time of session. The patient was fully cooperative throughout the session.  Appearance: age appropriate, appropriately  dressed, well groomed  Behavior/Cooperation: friendly and cooperative  Speech: normal in rate, volume, and tone and appropriate quality, quantity and organization of sentences  Mood:  some anxiousness  Affect: mood congruent  Thought Process: goal-directed, logical  Thought Content: normal,  No delusions or paranoia; did not appear to be responding to internal stimuli during the session  Orientation: grossly intact  Memory: grossly intact  Attention Span/Concentration: Attends to session without distraction  Fund of Knowledge: above average  Estimate of Intelligence: above average from verbal skills and history  Cognition: grossly intact  Insight: patient has awareness of illness; good insight into own behavior and behavior of others  Judgment: the patient's behavior is adequate to circumstances      Interval history and content of current session: Patient discussed events and activities since the time of last visit.  Patient stated that he has been staying active. He also noted that he worked a few days recently and found focusing on work to be relaxing for him.  Discussed current adaptation to disease and treatment status. Reports to be coping  with some difficulty . Evaluated cognitive response, paying  "particular attention to negative intrusive thoughts of a persistent and detrimental nature. Patient noted worried thoughts at times about his treatment course. Patient also reported crying spells daily, typically in the morning, and irritability. Patient noted things that he would consider not important make him feel irritable. Thoughts of this type are in evidence with moderate distress. Provided cognitive behavioral therapy to address negative cognitions. Patient stated he hopes treatment is working but worries that it is not. Patient was able to acknowledge that there have been no signs at this time that suggest negative outcome and that worrying about it will help his treatment at this time. Patient stated that he did eventually leave situation and went for a walk after being frustrated with his phone and that it reduced frustration. Discussed with patient changing environment when he starts to notice signs of frustration and patient agreed that this would be helpful. Identified and evaluated psychosocial and environmental stressors secondary to diagnosis and treatment. Patient noted sweating at night has decreased particularly on days following acupuncture. Examined proactive behaviors that may be implemented to minimize or ameliorate psychosocial stressors secondary to diagnosis and treatment. Patient noted physical activity has been a good coping skill for him. Patient did noted crying spells that typically occur in AM. Discussed ways to occupy his mind during these times to help increase his mood. Patient noted that in [facility] he tends to study Torah.     Risk parameters:   Patient reports no suicidal ideation  Patient reports no homicidal ideation  Patient reports no self-injurious behavior  Patient reports no violent behavior  Patient noted he marked several days for question 9 of PHQ-9 due to "fleeting thoughts" about not wanting to do ADT in future if it is recommended for further use.     Safety needs: "  None at this time      Verbal deficits: None     Patient's response to intervention:The patient's response to intervention is accepting, motivated.     Progress toward goals and other mental status changes:  The patient's progress toward goals is fair .      Progress to date:Progress - Ongoing, but Slow      Goals from last visit: Attempted, partially met        Patient Strengths: verbal, intelligent, successful, good social support, good insight, commitment to wellness, and strong cultural traditions    Patient reported outcomes:      [facility] Distress thermometer:   DISTRESS SCREENING 7/27/2023 7/27/2023 7/27/2023 7/21/2023 7/10/2023 6/30/2023 6/16/2023   Distress Score 8 6 6 1 10 - Extreme Distress 10 - Extreme Distress 8   Practical Problems Insurance/Financial Insurance/Financial;Work/School Insurance/Financial;Work/School - Treatment Decisions Work/School Work/School   Family Problems None of these None of these None of these - None of these None of these None of these   Emotional Problems Fears;Sadness;Worry Fears;Nervousness Fears;Nervousness - Depression;Fears;Nervousness;Sadness;Worry Depression;Fears;Sadness Depression   Spiritual / Mandaeism Concerns No No No - No No No   Physical Problems Fatigue;Sleep Fatigue Fatigue - Memory/Concentration;Pain;Sleep Memory/Concentration;Sexual;Sleep Sleep   Other Problems - - - - Any little difficulty or decision overwhelms me. - -           PHQ-9= Initial visit: 11    PHQ ANSWERS    Over the last 2 weeks, how often have you been bothered by any of the following problems?  Little interest or pleasure in doing things: Not at all  Feeling down, depressed, or hopeless: Nearly every day  Trouble falling or staying asleep, or sleeping too much: Nearly every day  Feeling tired or having little energy: More than half the days  Poor appetite or overeating: Not at all  Feeling bad about yourself - or that you are a failure or have let yourself or your family down: More than half  the days  Trouble concentrating on things, such as reading the newspaper or watching television: Several days  Moving or speaking so slowly that other people could have noticed. Or the opposite - being so fidgety or restless that you have been moving around a lot more than usual: Not at all  Thoughts that you would be better off dead, or of hurting yourself in some way: Several days  PHQ-9 Total Score: 12  If you checked off any problems, how difficult have these problems made it for you to do your work, take care of things at home, or get along with other people?: Very difficult    PHQ8 Score : 11 (07/27/23 0951)  PHQ-9 Total Score: 12 (07/27/23 0951)      PIPE-7= Initial visit: 6   GAD7 7/27/2023 7/10/2023 6/30/2023   1. Feeling nervous, anxious, or on edge? 3 3 1   2. Not being able to stop or control worrying? 1 1 1   3. Worrying too much about different things? 0 1 0   4. Trouble relaxing? 0 0 0   5. Being so restless that it is hard to sit still? 0 1 0   6. Becoming easily annoyed or irritable? 3 3 3   7. Feeling afraid as if something awful might happen? 3 3 1   PIPE-7 Score 10 12 6        Treatment Plan:individual psychotherapy  Target symptoms: depression, anxiety , adjustment, irritability  Why chosen therapy is appropriate versus another modality: relevant to diagnosis, patient responds to this modality, evidence based practice  Outcome monitoring methods: self-report, observation, checklist/rating scale  Therapeutic intervention type: insight oriented psychotherapy, behavior modifying psychotherapy, supportive psychotherapy  Prognosis: Good      Behavioral goals:    Exercise: Continue exercise as is physically appropriate   Stress management: Practice coping skills including changing environments when starting to feel signs of frustration   Therapy: Practice restructuring unhelpful thoughts    Return to clinic:  Two to three weeks     Length of Service (minutes direct face-to-face contact):  45    Diagnosis:     ICD-10-CM ICD-9-CM   1. Adjustment disorder with mixed anxiety and depressed mood  F43.23 309.28           Ritchie Luevano Psy.D.  Clinical Psychologist  [place] License #[id number]

## 2023-07-28 ENCOUNTER — OFFICE VISIT (OUTPATIENT)
Dept: HEMATOLOGY/ONCOLOGY | Facility: CLINIC | Age: 63
End: 2023-07-28
Payer: COMMERCIAL

## 2023-07-28 ENCOUNTER — LAB VISIT (OUTPATIENT)
Dept: LAB | Facility: HOSPITAL | Age: 63
End: 2023-07-28
Payer: COMMERCIAL

## 2023-07-28 ENCOUNTER — DOCUMENTATION ONLY (OUTPATIENT)
Dept: RADIATION ONCOLOGY | Facility: CLINIC | Age: 63
End: 2023-07-28
Payer: COMMERCIAL

## 2023-07-28 ENCOUNTER — PATIENT MESSAGE (OUTPATIENT)
Dept: REHABILITATION | Facility: HOSPITAL | Age: 63
End: 2023-07-28
Payer: COMMERCIAL

## 2023-07-28 VITALS
OXYGEN SATURATION: 98 % | HEIGHT: 72 IN | DIASTOLIC BLOOD PRESSURE: 76 MMHG | HEART RATE: 66 BPM | BODY MASS INDEX: 19.06 KG/M2 | RESPIRATION RATE: 20 BRPM | SYSTOLIC BLOOD PRESSURE: 126 MMHG | WEIGHT: 140.75 LBS

## 2023-07-28 DIAGNOSIS — R61 NIGHT SWEATS: ICD-10-CM

## 2023-07-28 DIAGNOSIS — C61 PROSTATE CANCER: ICD-10-CM

## 2023-07-28 DIAGNOSIS — F32.A DEPRESSION, UNSPECIFIED DEPRESSION TYPE: ICD-10-CM

## 2023-07-28 DIAGNOSIS — C61 PROSTATE CANCER: Primary | ICD-10-CM

## 2023-07-28 DIAGNOSIS — Z79.818 ENCOUNTER FOR MONITORING ANDROGEN DEPRIVATION THERAPY: ICD-10-CM

## 2023-07-28 DIAGNOSIS — M25.50 ARTHRALGIA, UNSPECIFIED JOINT: ICD-10-CM

## 2023-07-28 LAB
ALBUMIN SERPL BCP-MCNC: 3.7 G/DL (ref 3.5–5.2)
ALP SERPL-CCNC: 54 U/L (ref 55–135)
ALT SERPL W/O P-5'-P-CCNC: 26 U/L (ref 10–44)
ANION GAP SERPL CALC-SCNC: 10 MMOL/L (ref 8–16)
AST SERPL-CCNC: 24 U/L (ref 10–40)
BILIRUB SERPL-MCNC: 0.4 MG/DL (ref 0.1–1)
BUN SERPL-MCNC: 12 MG/DL (ref 8–23)
CALCIUM SERPL-MCNC: 9.5 MG/DL (ref 8.7–10.5)
CHLORIDE SERPL-SCNC: 104 MMOL/L (ref 95–110)
CO2 SERPL-SCNC: 26 MMOL/L (ref 23–29)
COMPLEXED PSA SERPL-MCNC: 0.06 NG/ML (ref 0–4)
CREAT SERPL-MCNC: 0.7 MG/DL (ref 0.5–1.4)
ERYTHROCYTE [DISTWIDTH] IN BLOOD BY AUTOMATED COUNT: 12.6 % (ref 11.5–14.5)
EST. GFR  (NO RACE VARIABLE): >60 ML/MIN/1.73 M^2
GLUCOSE SERPL-MCNC: 120 MG/DL (ref 70–110)
HCT VFR BLD AUTO: 38.1 % (ref 40–54)
HGB BLD-MCNC: 13.2 G/DL (ref 14–18)
IMM GRANULOCYTES # BLD AUTO: 0.02 K/UL (ref 0–0.04)
MCH RBC QN AUTO: 29.2 PG (ref 27–31)
MCHC RBC AUTO-ENTMCNC: 34.6 G/DL (ref 32–36)
MCV RBC AUTO: 84 FL (ref 82–98)
NEUTROPHILS # BLD AUTO: 2.3 K/UL (ref 1.8–7.7)
PLATELET # BLD AUTO: 157 K/UL (ref 150–450)
PMV BLD AUTO: 9.8 FL (ref 9.2–12.9)
POTASSIUM SERPL-SCNC: 3.8 MMOL/L (ref 3.5–5.1)
PROT SERPL-MCNC: 6.5 G/DL (ref 6–8.4)
RBC # BLD AUTO: 4.52 M/UL (ref 4.6–6.2)
SODIUM SERPL-SCNC: 140 MMOL/L (ref 136–145)
TESTOST SERPL-MCNC: 13 NG/DL (ref 304–1227)
WBC # BLD AUTO: 3.98 K/UL (ref 3.9–12.7)

## 2023-07-28 PROCEDURE — 3078F PR MOST RECENT DIASTOLIC BLOOD PRESSURE < 80 MM HG: ICD-10-PCS | Mod: CPTII,S$GLB,, | Performed by: NURSE PRACTITIONER

## 2023-07-28 PROCEDURE — 77014 PR  CT GUIDANCE PLACEMENT RAD THERAPY FIELDS: ICD-10-PCS | Mod: 26,,, | Performed by: STUDENT IN AN ORGANIZED HEALTH CARE EDUCATION/TRAINING PROGRAM

## 2023-07-28 PROCEDURE — 3074F PR MOST RECENT SYSTOLIC BLOOD PRESSURE < 130 MM HG: ICD-10-PCS | Mod: CPTII,S$GLB,, | Performed by: NURSE PRACTITIONER

## 2023-07-28 PROCEDURE — 84403 ASSAY OF TOTAL TESTOSTERONE: CPT | Performed by: NURSE PRACTITIONER

## 2023-07-28 PROCEDURE — 99214 PR OFFICE/OUTPT VISIT, EST, LEVL IV, 30-39 MIN: ICD-10-PCS | Mod: S$GLB,,, | Performed by: NURSE PRACTITIONER

## 2023-07-28 PROCEDURE — 77014 PR  CT GUIDANCE PLACEMENT RAD THERAPY FIELDS: CPT | Mod: 26,,, | Performed by: STUDENT IN AN ORGANIZED HEALTH CARE EDUCATION/TRAINING PROGRAM

## 2023-07-28 PROCEDURE — 84153 ASSAY OF PSA TOTAL: CPT | Performed by: NURSE PRACTITIONER

## 2023-07-28 PROCEDURE — 3074F SYST BP LT 130 MM HG: CPT | Mod: CPTII,S$GLB,, | Performed by: NURSE PRACTITIONER

## 2023-07-28 PROCEDURE — 85027 COMPLETE CBC AUTOMATED: CPT | Performed by: NURSE PRACTITIONER

## 2023-07-28 PROCEDURE — 3078F DIAST BP <80 MM HG: CPT | Mod: CPTII,S$GLB,, | Performed by: NURSE PRACTITIONER

## 2023-07-28 PROCEDURE — 99214 OFFICE O/P EST MOD 30 MIN: CPT | Mod: S$GLB,,, | Performed by: NURSE PRACTITIONER

## 2023-07-28 PROCEDURE — 36415 COLL VENOUS BLD VENIPUNCTURE: CPT | Performed by: NURSE PRACTITIONER

## 2023-07-28 PROCEDURE — 99999 PR PBB SHADOW E&M-EST. PATIENT-LVL III: ICD-10-PCS | Mod: PBBFAC,,, | Performed by: NURSE PRACTITIONER

## 2023-07-28 PROCEDURE — 99999 PR PBB SHADOW E&M-EST. PATIENT-LVL III: CPT | Mod: PBBFAC,,, | Performed by: NURSE PRACTITIONER

## 2023-07-28 PROCEDURE — 3008F BODY MASS INDEX DOCD: CPT | Mod: CPTII,S$GLB,, | Performed by: NURSE PRACTITIONER

## 2023-07-28 PROCEDURE — 80053 COMPREHEN METABOLIC PANEL: CPT | Performed by: NURSE PRACTITIONER

## 2023-07-28 PROCEDURE — 3008F PR BODY MASS INDEX (BMI) DOCUMENTED: ICD-10-PCS | Mod: CPTII,S$GLB,, | Performed by: NURSE PRACTITIONER

## 2023-07-28 PROCEDURE — 77385 HC IMRT, SIMPLE: CPT | Performed by: STUDENT IN AN ORGANIZED HEALTH CARE EDUCATION/TRAINING PROGRAM

## 2023-07-28 NOTE — PROGRESS NOTES
ADVANCED PROSTATE [facility]CER CLINIC - PATIENT VISIT     Best Contact Phone Number(s): [phone] (home)       Cancer/Stage/TNM:    Cancer Staging   Prostate cancer  Staging form: Prostate, AJCC 8th Edition  - Clinical stage from 2/14/2023: Stage IIC (cT2b, cN0, cM0, PSA: 17.7, Grade Group: 3) - Signed by Patricia Willson NP on 4/28/2023        Reason for visit:   Chief Complaint   Patient presents with    Follow-up     results        HPI:    Jose Fischer is a 63 y.o. male, patient of Dr. Elizabeth, [facility] Since last visit, he has started acupuncture, yoga, and seen nutrition. Feels like acupuncture has helped significantly with night sweats. Now on Wellbutrin. He still has anxiety, irritation and crying spells. Denies hematuria, diarrhea or skin irritation. Seems to tolerate XRT well. Has 16 sessions left.      History has been obtained by chart review and discussion with the patient.     Oncology History   Prostate cancer   2/14/2023 Cancer Staged    Staging form: Prostate, AJCC 8th Edition  - Clinical stage from 2/14/2023: Stage IIC (cT2b, cN0, cM0, PSA: 17.7, Grade Group: 3)     3/14/2023 Initial Diagnosis    Prostate cancer     4/11/2023 Genetic Testing    [facility] 91-gene panel: Negative.            Past Medical History:   Diagnosis Date    Depression     Prostate cancer          Past Surgical History:   Procedure Laterality Date    PROSTATE BIOPSY  02/14/2023         I have reviewed and updated the patient's past medical, surgical, family and social histories.     Review of patient's allergies indicates:  No Known Allergies      Current Outpatient Medications   Medication Sig Dispense Refill    buPROPion (WELLBUTRIN XL) 150 MG TB24 tablet Take 1 tablet (150 mg total) by mouth once daily. 30 tablet 11    sildenafiL (VIAGRA) 50 MG tablet Take 50 mg by mouth daily as needed for Erectile Dysfunction.      tamsulosin (FLOMAX) 0.4 mg Cap Take 1 capsule (0.4 mg total) by mouth nightly. 90  capsule 3     No current facility-administered medications for this visit.        Objective:      Physical Exam:   /76   Pulse 66   Resp 20   Ht 6' (1.829 m)   Wt 63.9 kg (140 lb 12.2 oz)   SpO2 98%   BMI 19.09 kg/m²       ECOG Performance status: (0) Fully active, able to carry on all predisease performance without restriction     Physical Exam  Constitutional:       Appearance: Normal appearance.      Comments: Thin appearance   HENT:      Head: Normocephalic and atraumatic.   Pulmonary:      Effort: Pulmonary effort is normal.   Skin:     General: Skin is warm and dry.   Neurological:      Mental Status: He is alert.   Psychiatric:         Mood and Affect: Mood normal.         Behavior: Behavior normal.         Thought Content: Thought content normal.         Judgment: Judgment normal.        Recent Labs:   Lab Results   Component Value Date    WBC 3.98 07/28/2023    RBC 4.52 (L) 07/28/2023    HGB 13.2 (L) 07/28/2023    HCT 38.1 (L) 07/28/2023    MCV 84 07/28/2023    MCH 29.2 07/28/2023    MCHC 34.6 07/28/2023    RDW 12.6 07/28/2023     07/28/2023    MPV 9.8 07/28/2023    GRAN 2.3 07/28/2023    IGABS 0.02 07/28/2023       Lab Results   Component Value Date     07/28/2023    K 3.8 07/28/2023     07/28/2023    CO2 26 07/28/2023     (H) 07/28/2023    BUN 12 07/28/2023    CREATININE 0.7 07/28/2023    CALCIUM 9.5 07/28/2023    PROT 6.5 07/28/2023    ALBUMIN 3.7 07/28/2023    BILITOT 0.4 07/28/2023    ALKPHOS 54 (L) 07/28/2023    AST 24 07/28/2023    ALT 26 07/28/2023    ANIONGAP 10 07/28/2023    EGFRNORACEVR >60.0 07/28/2023        Lab Results   Component Value Date    PSADIAG 0.06 07/28/2023    PSADIAG 18.8 (H) 04/28/2023    PSADIAG 22.1 (H) 04/04/2023        Cardiovascular Screening:  Primary care physician: Annabelle Garcia MD      The ASCVD Risk score (Boris DK, et al., 2019) failed to calculate for the following reasons:    Cannot find a previous HDL lab    Cannot find a  previous total cholesterol lab    ASCVD Risk Level: N/A    EKG: No results found for this or any previous visit.    High blood pressure = No  Antihypertensive agents: This patient does not have an active medication from one of the medication groupers.   Comments:     DM2: No  Antidiabetic agents: This patient does not have an active medication from one of the medication groupers.   Comments:     Hyperlipidemia: No  Lipid lowering agents: This patient does not have an active medication from one of the medication groupers.   Comments:     Antiplatelet therapy: No  Agent: This patient does not have an active medication from one of the medication groupers.   Comment:     Body mass index is 19.09 kg/m².       Bone Health    No results found for: [id number]     No results found for this or any previous visit.       Vitamin D: 1000 IU daily  Calcium: Recommend 4 servings of dairy daily     Osteopenia/Osteoporosis: Unknown    Bone strengthening agent: None     Staging Imaging     Results for orders placed during the hospital encounter of 03/31/23    NM PET CT F 18 PYL PSMA, Midthigh to Vertex    Impression  Focal prostatic radiotracer avid lesion correlating with known malignancy.    No evidence of regional or distant radiotracer avid metastatic disease.    I, Sourav Vargas MD, attest that I reviewed and interpreted the images.    Electronically signed by resident: Cortes Sen  Date:    03/31/2023  Time:    15:23    Electronically signed by: Sourav Vargas  Date:    03/31/2023  Time:    15:42       No results found for this or any previous visit.      No results found for this or any previous visit.       No results found for this or any previous visit.       I have personally reviewed the above imaging.     Path:   Reviewed pathology as documented above.    Genomic testing:     Germline genetic testing  Results for orders placed or performed in visit on 04/11/23   Genetic Misc Sendout Test, Blood   Result Value Ref Range     Miscellaneous Genetic Test Name Moses BRCA + CustomNext-Cancer + RNA     Genso Specimen Type Blood     Genetic counseling? Yes     Parental or Sibling Testing? No     Test Result See result image under hyperlink     Reference Lab SEE COMMENT         Somatic tumor genotyping:      ctDNA genotyping:       Diagnoses:     1. Prostate cancer    2. Encounter for monitoring androgen deprivation therapy    3. Arthralgia, unspecified joint    4. Depression, unspecified depression type    5. Night sweats          Assessment and Plan:      Cancer Staging   Prostate cancer  Staging form: Prostate, AJCC 8th Edition  - Clinical stage from 2/14/2023: Stage IIC (cT2b, cN0, cM0, PSA: 17.7, Grade Group: 3) - Signed by Patricia Willson NP on 4/28/2023    Jose Fischer is a 63 y.o. male, patient of Dr. Elizabeth, seen today in [facility] to discuss treatment for his recent diagnosis of prostate cancer. Long conversation with patient regarding prostate cancer disease, natural history of the disease, treatment options per NCCN guidelines. Patient has decided to pursue XRT and ADT. Discussed he will receive ADT for 6 months and will do PSA surveillance every 6 months for 5 years following definitive treatment and then yearly to monitor for recurrence.         Discussed severity of symptoms with Dr. Elizabeth and Dr. Liu. Given the degree of interference with his daily life and emotional distress, Dr. Elizabeth is okay with holding final ADT that is scheduled for today. He will complete XRT and will plan to see him back on 9/14/23. He will continue to follow with Dr. Dan and Dr. Luevano.    Case discussed with Dr. Elizabeth. Patient is in agreement with the proposed treatment plan. All questions were answered to the patient's satisfaction. Pt knows to call clinic if anything is needed before the next clinic visit.    Patricia Willson, MSN, APRN, FNP-C  Hematology and Medical Oncology  Nurse Practitioner to Dr. Cardenas  Clara  Nurse Practitioner, [facility]        Follow up:   Route Chart for Scheduling    Med Onc Chart Routing      Follow up with physician . Rtc 9/14 with labs (CBC,CMP,PSA,testosterone) and to see Dr. Elizabeth.   Follow up with BLAISE    Infusion scheduling note    Injection scheduling note    Labs    Imaging    Pharmacy appointment    Other referrals               Supportive Plan Information  OP PROSTATE LEUPROLIDE Q3MO   Ronald Elizabeth MD   Upcoming Treatment Dates - OP PROSTATE LEUPROLIDE Q3MO    7/21/2023       Chemotherapy       leuprolide (LUPRON) injection 22.5 mg  10/13/2023       Chemotherapy       leuprolide (LUPRON) injection 22.5 mg  1/5/2024       Chemotherapy       leuprolide (LUPRON) injection 22.5 mg  3/29/2024       Chemotherapy       leuprolide (LUPRON) injection 22.5 mg         The above information has been reviewed with the patient and all questions have been answered to their apparent satisfaction.  They understand that they can call the clinic with any questions.    Patricia Willson

## 2023-07-28 NOTE — PLAN OF CARE
Day 12 of outpatient radiation to the prostate. Overall doing well. Mild dysuria, no hematuria. Increased urgency. Using miralax.

## 2023-07-31 PROCEDURE — 77014 PR  CT GUIDANCE PLACEMENT RAD THERAPY FIELDS: CPT | Mod: 26,,, | Performed by: STUDENT IN AN ORGANIZED HEALTH CARE EDUCATION/TRAINING PROGRAM

## 2023-07-31 PROCEDURE — 77014 PR  CT GUIDANCE PLACEMENT RAD THERAPY FIELDS: ICD-10-PCS | Mod: 26,,, | Performed by: STUDENT IN AN ORGANIZED HEALTH CARE EDUCATION/TRAINING PROGRAM

## 2023-07-31 PROCEDURE — 77385 HC IMRT, SIMPLE: CPT | Performed by: STUDENT IN AN ORGANIZED HEALTH CARE EDUCATION/TRAINING PROGRAM

## 2023-07-31 NOTE — PROGRESS NOTES
"[facility]  Occupational Therapy Treatment Note - Therapeutic Yoga Progam    Date: 8/2/2023  Name: Jose Fischer  Clinic Number: [id number]    Therapy Diagnosis:   Encounter Diagnoses   Name Primary?    Stress and adjustment reaction Yes    Decreased functional mobility     Alteration in instrumental activities of daily living (IADL)        Physician: Mikaela Aiken, *    Physician Orders: Eval and Treat   Medical Diagnosis from Referral: Joint pain [M25.50], Depression [F32.A], Low back pain [M54.50]  Evaluation Date: 7/13/2023  Authorization Period Expiration: 12/31/2023  Plan of Care Expiration: 11/10/2023  Progress Note Due: 9/1/2023  Visit # / Visits authorized: 2/ 20 (+eval)     Precautions:  Standard and cancer    Time In: 9:00 AM  Time Out: 10:00 AM  Total Billable Time: 60 minutes    SUBJECTIVE     Pt reports: "I am good. I went to the yoga class on Tuesday night and that was really helpful"    He was compliant with home exercise program given last session. "It was hard for me to remember everything but I did try and practice and do things to open up and breathe slow"  Response to previous treatment: good  Functional change: TBE    Pain: 0/10  Location: na    OBJECTIVE     Objective Measures updated at progress report unless specified.    Patient Specific Functional Scale:           Activity 7/13/23           Emotional control  3           2. Improved concentration for job tasks 4           3. Improve mobility/strength  6           4. Stress management  2           5.             6.             SCORE  3.75              Total Score = Sum of activity scores / number of activities  Minimum Detectable Change (90% CII) for average score = 2 points  Minimum Detectable Change (90% CI) for single activity score = 3 points    Treatment     Jose received the treatments listed below:     Date 7/19/23 8/2/23       Therapeutic Yoga Exercises / Neuromuscular Re-education 45 minutes  " 45minutes  minutes  minutes  minutes  minutes   Seated Yoga   Chair yoga: side bends, shoulder mobility, cat/cow, fold, backbend, twists, single knee to chest, twist, single hamstring stretch with strap and hands on asst, protraction/retraction   chair yoga: side bends, shoulder mobility, cat/cow, fold, backbend, twists, single knee to chest, twist, single hamstring stretch with strap and hands on asst, protraction/retraction       Quadruped         Supine Hip flexor stretch with strap + quad stretch at edge of mat,  On floor:  Windshield wipers, twist, B knees to chest, bound angle, mod happy baby Pelvic tilts x12, Windshield wipers, twist, B knees to chest, wide B knees to chest, mod/half happy baby, supine twist with asst       Prone         standing Chair down dog x2 with asst Chair down dog x2 with asst, chair pose with block x3 (int/ext rotation), wall squats x5                         Self-Care/Home Management  / Therapeutic Activities 15 minutes  15minutes  minutes  minutes  minutes  minutes            Relaxation techniques DB with tactile cues, guided 3 part breath meditation Guided DB with music, body scan       Restorative  Z-lie on chair Supine bolster under knees       Activity Pacing One breath/one action                          Stress Management/Education  Physiology of yoga and immune health                      Patient Education and Home Exercises      Education provided:   - physiology of yoga and immune health  - Progress towards goals     Written Home Exercises Provided: yes.  Exercises were reviewed and Jose was able to demonstrate them prior to the end of the session.  Jose demonstrated good  understanding of the HEP provided. See EMR under Patient Instructions for exercises provided during therapy sessions.       Assessment     Jose presents to session with good energy and with report of good effort made to use yoga HEP despite leaving it at home. Good response and tolerance to session today  "with added focus on proprioception and lower body strengthening. Good tolerance to standing and supine poses and with improved body awareness by end of session. Continues with difficulty with proprio/interoception but with good response and adjustments made with hands on asst and physical demonstration. Good response to restorative practice and at end of session noted "I feel good good good".   Jose is progressing well towards his goals and there are no updates to goals at this time. Pt prognosis is Good.     Pt will continue to benefit from skilled outpatient occupational therapy to address the deficits listed in the problem list on initial evaluation provide pt/family education and to maximize pt's level of independence in the home and community environment. Pt's spiritual, cultural and educational needs considered and pt agreeable to plan of care and goals.    Anticipated barriers to occupational therapy: none    Goals:  Short Term Goals: 6 weeks      Goal # Goal Status   1 Patient will be independent with Home Exercise Program to increase endurance and manage stress. Progressing   2 Patient will demonstrate independence with diaphragmatic breathing in sit and supine. Progressing   3 Patient will identify 2 new stress coping skills for stress management/immune health. Progressing   4 Patient will identify activity pacing problems.  Patient will then implement new plan for daily activity to increase endurance for ADL's. Progressing      Long Term Goals: 12 weeks      Goal # Goal Status   1 Patient will demonstrate independence with yoga Home Exercise Program to increase strength and endurance for ADL's. Progressing   2 Patient will demonstrate independence with relaxation techniques to manage stress for immune health. Progressing   3 Patient will verbalize good understanding of stress/immune health relationship.  Progressing   4            PLAN     Plan of care Certification: 7/13/2023 to 11/10/2023.   "   Outpatient Occupational Therapy 1 times weekly for 12 weeks to include the following interventions: Manual Therapy, Neuromuscular Re-ed, Patient Education, Self Care, Therapeutic Activities, and Therapeutic Exercise.     Diane Johnson, OT

## 2023-08-01 ENCOUNTER — HOSPITAL ENCOUNTER (OUTPATIENT)
Dept: RADIATION THERAPY | Facility: HOSPITAL | Age: 63
Discharge: HOME | End: 2023-08-01
Attending: STUDENT IN AN ORGANIZED HEALTH CARE EDUCATION/TRAINING PROGRAM
Payer: COMMERCIAL

## 2023-08-01 PROCEDURE — 77014 PR  CT GUIDANCE PLACEMENT RAD THERAPY FIELDS: ICD-10-PCS | Mod: 26,,, | Performed by: STUDENT IN AN ORGANIZED HEALTH CARE EDUCATION/TRAINING PROGRAM

## 2023-08-01 PROCEDURE — 77385 HC IMRT, SIMPLE: CPT | Performed by: STUDENT IN AN ORGANIZED HEALTH CARE EDUCATION/TRAINING PROGRAM

## 2023-08-01 PROCEDURE — 77014 HC CT GUIDANCE RADIATION THERAPY FLDS PLACEMENT: CPT | Mod: TC | Performed by: STUDENT IN AN ORGANIZED HEALTH CARE EDUCATION/TRAINING PROGRAM

## 2023-08-01 PROCEDURE — 77014 PR  CT GUIDANCE PLACEMENT RAD THERAPY FIELDS: CPT | Mod: 26,,, | Performed by: STUDENT IN AN ORGANIZED HEALTH CARE EDUCATION/TRAINING PROGRAM

## 2023-08-02 ENCOUNTER — CLINICAL SUPPORT (OUTPATIENT)
Dept: REHABILITATION | Facility: HOSPITAL | Age: 63
End: 2023-08-02
Payer: COMMERCIAL

## 2023-08-02 DIAGNOSIS — Z78.9 ALTERATION IN INSTRUMENTAL ACTIVITIES OF DAILY LIVING (IADL): ICD-10-CM

## 2023-08-02 DIAGNOSIS — F43.29 STRESS AND ADJUSTMENT REACTION: Primary | ICD-10-CM

## 2023-08-02 DIAGNOSIS — R26.89 DECREASED FUNCTIONAL MOBILITY: ICD-10-CM

## 2023-08-02 PROCEDURE — 77014 PR  CT GUIDANCE PLACEMENT RAD THERAPY FIELDS: CPT | Mod: 26,,, | Performed by: STUDENT IN AN ORGANIZED HEALTH CARE EDUCATION/TRAINING PROGRAM

## 2023-08-02 PROCEDURE — 97535 SELF CARE MNGMENT TRAINING: CPT

## 2023-08-02 PROCEDURE — 77336 RADIATION PHYSICS CONSULT: CPT | Performed by: STUDENT IN AN ORGANIZED HEALTH CARE EDUCATION/TRAINING PROGRAM

## 2023-08-02 PROCEDURE — 77014 HC CT GUIDANCE RADIATION THERAPY FLDS PLACEMENT: CPT | Mod: TC | Performed by: STUDENT IN AN ORGANIZED HEALTH CARE EDUCATION/TRAINING PROGRAM

## 2023-08-02 PROCEDURE — 97112 NEUROMUSCULAR REEDUCATION: CPT

## 2023-08-02 PROCEDURE — 77385 HC IMRT, SIMPLE: CPT | Performed by: STUDENT IN AN ORGANIZED HEALTH CARE EDUCATION/TRAINING PROGRAM

## 2023-08-02 PROCEDURE — 97110 THERAPEUTIC EXERCISES: CPT

## 2023-08-02 PROCEDURE — 77014 PR  CT GUIDANCE PLACEMENT RAD THERAPY FIELDS: ICD-10-PCS | Mod: 26,,, | Performed by: STUDENT IN AN ORGANIZED HEALTH CARE EDUCATION/TRAINING PROGRAM

## 2023-08-03 PROCEDURE — 77427 RADIATION TX MANAGEMENT X5: CPT | Mod: ,,, | Performed by: STUDENT IN AN ORGANIZED HEALTH CARE EDUCATION/TRAINING PROGRAM

## 2023-08-03 PROCEDURE — 77014 HC CT GUIDANCE RADIATION THERAPY FLDS PLACEMENT: CPT | Mod: TC | Performed by: STUDENT IN AN ORGANIZED HEALTH CARE EDUCATION/TRAINING PROGRAM

## 2023-08-03 PROCEDURE — 77014 PR  CT GUIDANCE PLACEMENT RAD THERAPY FIELDS: CPT | Mod: 26,,, | Performed by: STUDENT IN AN ORGANIZED HEALTH CARE EDUCATION/TRAINING PROGRAM

## 2023-08-03 PROCEDURE — 77427 PR CHG RADIATION,MANGEMENT,5 TX'S: ICD-10-PCS | Mod: ,,, | Performed by: STUDENT IN AN ORGANIZED HEALTH CARE EDUCATION/TRAINING PROGRAM

## 2023-08-03 PROCEDURE — 77014 PR  CT GUIDANCE PLACEMENT RAD THERAPY FIELDS: ICD-10-PCS | Mod: 26,,, | Performed by: STUDENT IN AN ORGANIZED HEALTH CARE EDUCATION/TRAINING PROGRAM

## 2023-08-03 PROCEDURE — 77385 HC IMRT, SIMPLE: CPT | Performed by: STUDENT IN AN ORGANIZED HEALTH CARE EDUCATION/TRAINING PROGRAM

## 2023-08-04 ENCOUNTER — PATIENT MESSAGE (OUTPATIENT)
Dept: HEMATOLOGY/ONCOLOGY | Facility: CLINIC | Age: 63
End: 2023-08-04
Payer: COMMERCIAL

## 2023-08-04 ENCOUNTER — DOCUMENTATION ONLY (OUTPATIENT)
Dept: RADIATION ONCOLOGY | Facility: CLINIC | Age: 63
End: 2023-08-04
Payer: COMMERCIAL

## 2023-08-04 PROCEDURE — 77385 HC IMRT, SIMPLE: CPT | Performed by: STUDENT IN AN ORGANIZED HEALTH CARE EDUCATION/TRAINING PROGRAM

## 2023-08-04 PROCEDURE — 77014 PR  CT GUIDANCE PLACEMENT RAD THERAPY FIELDS: ICD-10-PCS | Mod: 26,,, | Performed by: STUDENT IN AN ORGANIZED HEALTH CARE EDUCATION/TRAINING PROGRAM

## 2023-08-04 PROCEDURE — 77014 PR  CT GUIDANCE PLACEMENT RAD THERAPY FIELDS: CPT | Mod: 26,,, | Performed by: STUDENT IN AN ORGANIZED HEALTH CARE EDUCATION/TRAINING PROGRAM

## 2023-08-07 PROCEDURE — 77014 PR  CT GUIDANCE PLACEMENT RAD THERAPY FIELDS: ICD-10-PCS | Mod: 26,,, | Performed by: STUDENT IN AN ORGANIZED HEALTH CARE EDUCATION/TRAINING PROGRAM

## 2023-08-07 PROCEDURE — 77385 HC IMRT, SIMPLE: CPT | Performed by: STUDENT IN AN ORGANIZED HEALTH CARE EDUCATION/TRAINING PROGRAM

## 2023-08-07 PROCEDURE — 77014 PR  CT GUIDANCE PLACEMENT RAD THERAPY FIELDS: CPT | Mod: 26,,, | Performed by: STUDENT IN AN ORGANIZED HEALTH CARE EDUCATION/TRAINING PROGRAM

## 2023-08-08 PROCEDURE — 77385 HC IMRT, SIMPLE: CPT | Performed by: STUDENT IN AN ORGANIZED HEALTH CARE EDUCATION/TRAINING PROGRAM

## 2023-08-08 PROCEDURE — 77014 PR  CT GUIDANCE PLACEMENT RAD THERAPY FIELDS: CPT | Mod: 26,,, | Performed by: STUDENT IN AN ORGANIZED HEALTH CARE EDUCATION/TRAINING PROGRAM

## 2023-08-08 PROCEDURE — 77014 PR  CT GUIDANCE PLACEMENT RAD THERAPY FIELDS: ICD-10-PCS | Mod: 26,,, | Performed by: STUDENT IN AN ORGANIZED HEALTH CARE EDUCATION/TRAINING PROGRAM

## 2023-08-08 NOTE — PLAN OF CARE
Day 17 of outpatient radiation to the prostate. Mild burning upon urination. Nocturia x 6-7. No diarrhea.

## 2023-08-09 PROCEDURE — 77014 PR  CT GUIDANCE PLACEMENT RAD THERAPY FIELDS: CPT | Mod: 26,,, | Performed by: STUDENT IN AN ORGANIZED HEALTH CARE EDUCATION/TRAINING PROGRAM

## 2023-08-09 PROCEDURE — 77385 HC IMRT, SIMPLE: CPT | Performed by: STUDENT IN AN ORGANIZED HEALTH CARE EDUCATION/TRAINING PROGRAM

## 2023-08-09 PROCEDURE — 77336 RADIATION PHYSICS CONSULT: CPT | Performed by: STUDENT IN AN ORGANIZED HEALTH CARE EDUCATION/TRAINING PROGRAM

## 2023-08-09 PROCEDURE — 77014 PR  CT GUIDANCE PLACEMENT RAD THERAPY FIELDS: ICD-10-PCS | Mod: 26,,, | Performed by: STUDENT IN AN ORGANIZED HEALTH CARE EDUCATION/TRAINING PROGRAM

## 2023-08-09 NOTE — PROGRESS NOTES
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    Low back pain, unspecified back pain laterality, unspecified chronicity, unspecified whether sciatica present Yes    Night sweats     Stress and adjustment reaction         Evaluation Date: 8/9/2023    Visit #/Visits authorized: 12    Precautions: Standard and cancer    Subjective     Chief Concern: cLBP, hot flashes/night sweats, anxiety and depression    Medical necessity is demonstrated by the following IMPAIRMENTS: Medical Necessity: Decreased mobility limits day to day activities, social, and emergent situations and Decreased quality of life              Aggravating Factors:  movement, heat, and stress   Relieving Factors:  rest    Symptom Description:     Quality:  Hot and Variable  Severity:  N/A  Frequency:  continuously    Treatment     Treatment Principles:  move qi and blood, tonify yin    Acupuncture points used:  4 MENSAH, Du20, ER SHMEAR, Ht7, Pc6, Lu9 , Kd10, Ki3, Li11, Lu7, REN4, REN6, DICKERSON MEN, Sp6, Sp9, SSC, St36, and YIN PAIZ        Needles In: 28  Needles Out: 28  [place] W/ STIM placed: 11:15 AM  Needles W/ STIM removed: 11:45 AM        Assessment     After treatment, patient felt well, noticing a great reduction and management of symptoms     Patient prognosis is Good.     Patient will continue to benefit from acupuncture treatment to address the deficits listed in the problem list box on initial evaluation, provide patient family education and to maximize pt's level of independence in the home and community environment.     Patient's spiritual, cultural and educational needs considered and pt agreeable to plan of care and goals.     Anticipated barriers to treatment: none / commuting    Plan     Recommend 1 /week for 2-3 more sessions then re-assess.       Education:  Patient is aware of cumulative benefit of acupuncture

## 2023-08-09 NOTE — PROGRESS NOTES
"[facility]  Occupational Therapy Treatment Note - Therapeutic Yoga Progam    Date: 8/10/2023  Name: Jose Fischer  Clinic Number: [id number]    Therapy Diagnosis:   Encounter Diagnoses   Name Primary?    Stress and adjustment reaction Yes    Decreased functional mobility     Alteration in instrumental activities of daily living (IADL)          Physician: Mikaela Aiken, *    Physician Orders: Eval and Treat   Medical Diagnosis from Referral: Joint pain [M25.50], Depression [F32.A], Low back pain [M54.50]  Evaluation Date: 7/13/2023  Authorization Period Expiration: 12/31/2023  Plan of Care Expiration: 11/10/2023  Progress Note Due: 9/1/2023  Visit # / Visits authorized: 3 / 20 (+eval)     Precautions:  Standard and cancer    Time In: 2:30 PM  Time Out: 3:25 PM  Total Billable Time: 55 minutes    SUBJECTIVE     Pt reports: "I'm feeling pretty good these days, tired all the time but otherwise alright"    He was compliant with home exercise program given last session. "Some yea, I'm never sure I'm doing the right way but jodie been doing It about every other day"  Response to previous treatment: good  Functional change: TBE    Pain: 0/10  Location: na    OBJECTIVE     Objective Measures updated at progress report unless specified.    Patient Specific Functional Scale:           Activity 7/13/23           Emotional control  3           2. Improved concentration for job tasks 4           3. Improve mobility/strength  6           4. Stress management  2           5.             6.             SCORE  3.75              Total Score = Sum of activity scores / number of activities  Minimum Detectable Change (90% CII) for average score = 2 points  Minimum Detectable Change (90% CI) for single activity score = 3 points    Treatment     Jose received the treatments listed below:     Date 7/19/23 8/2/23 8/10/23      Therapeutic Yoga Exercises / Neuromuscular Re-education 45 minutes  45minutes  30 " minutes  minutes  minutes  minutes   Seated Yoga   Chair yoga: side bends, shoulder mobility, cat/cow, fold, backbend, twists, single knee to chest, twist, single hamstring stretch with strap and hands on asst, protraction/retraction   chair yoga: side bends, shoulder mobility, cat/cow, fold, backbend, twists, single knee to chest, twist, single hamstring stretch with strap and hands on asst, protraction/retraction Chair yoga: cat/cow, fold, backbend, twists, single knee to chest, twist, chicken wing, single hamstring stretch with hands on asst, forward fold legs together, shoulder mobility flow      Quadruped   [place] pose      Supine Hip flexor stretch with strap + quad stretch at edge of mat,  On floor:  Windshield wipers, twist, B knees to chest, bound angle, mod happy baby Pelvic tilts x12, Windshield wipers, twist, B knees to chest, wide B knees to chest, mod/half happy baby, supine twist with asst Windshield wipers, B knees to chest, twist, butterfly      Prone   cobra x3, mod superman x3, prone chest opener with stacked hips      standing Chair down dog x2 with asst Chair down dog x2 with asst, chair pose with block x3 (int/ext rotation), wall squats x5 Chair down dog x2 with asst, wall sits 2x4                        Self-Care/Home Management  / Therapeutic Activities 15 minutes  15minutes  25 minutes  minutes  minutes  minutes            Relaxation techniques DB with tactile cues, guided 3 part breath meditation Guided DB with music, body scan DB, pursed lip breathing, sigh breathing      Restorative  Z-lie on chair Supine bolster under knees Knocked knees supine      Activity Pacing One breath/one action  Discussed pacing and signs of fatigue, discussed boom/bust cycle                         Stress Management/Education  Physiology of yoga and immune health   Physiology of yoga and immune health                    Patient Education and Home Exercises      Education provided:   - physiology of yoga and  "immune health  - Progress towards goals     Written Home Exercises Provided: yes.  Exercises were reviewed and Jose was able to demonstrate them prior to the end of the session.  Jose demonstrated good  understanding of the HEP provided. See EMR under Patient Instructions for exercises provided during therapy sessions.       Assessment     Jose presents to session with report of lower energy levels and increased fatigue but feeling good overall and with no pain. Noted good adherence to yoga HEP. Good response and tolerance to session today with added focus on prone strengthening. Some dizziness throughout session and at end of session which dissipated with rest and hydration. Good discussion re: boom/bust cycle, activity pacing, and mind/body connection. Continues with difficulty with proprio/interoception but with good response and adjustments made with hands on asst and physical demonstration. Good response to restorative practice and at end of session noted "I think this is so great, I have been telling everyone about it".   Jose is progressing well towards his goals and there are no updates to goals at this time. Pt prognosis is Good.     Pt will continue to benefit from skilled outpatient occupational therapy to address the deficits listed in the problem list on initial evaluation provide pt/family education and to maximize pt's level of independence in the home and community environment. Pt's spiritual, cultural and educational needs considered and pt agreeable to plan of care and goals.    Anticipated barriers to occupational therapy: none    Goals:  Short Term Goals: 6 weeks      Goal # Goal Status   1 Patient will be independent with Home Exercise Program to increase endurance and manage stress. Progressing   2 Patient will demonstrate independence with diaphragmatic breathing in sit and supine. Progressing   3 Patient will identify 2 new stress coping skills for stress management/immune health. Progressing "   4 Patient will identify activity pacing problems.  Patient will then implement new plan for daily activity to increase endurance for ADL's. Progressing      Long Term Goals: 12 weeks      Goal # Goal Status   1 Patient will demonstrate independence with yoga Home Exercise Program to increase strength and endurance for ADL's. Progressing   2 Patient will demonstrate independence with relaxation techniques to manage stress for immune health. Progressing   3 Patient will verbalize good understanding of stress/immune health relationship.  Progressing   4            PLAN     Plan of care Certification: 7/13/2023 to 11/10/2023.     Outpatient Occupational Therapy 1 times weekly for 12 weeks to include the following interventions: Manual Therapy, Neuromuscular Re-ed, Patient Education, Self Care, Therapeutic Activities, and Therapeutic Exercise.     Diane Johnson, OT       no

## 2023-08-10 ENCOUNTER — CLINICAL SUPPORT (OUTPATIENT)
Dept: REHABILITATION | Facility: HOSPITAL | Age: 63
End: 2023-08-10
Payer: COMMERCIAL

## 2023-08-10 DIAGNOSIS — R26.89 DECREASED FUNCTIONAL MOBILITY: ICD-10-CM

## 2023-08-10 DIAGNOSIS — R61 NIGHT SWEATS: Primary | ICD-10-CM

## 2023-08-10 DIAGNOSIS — F43.29 STRESS AND ADJUSTMENT REACTION: Primary | ICD-10-CM

## 2023-08-10 DIAGNOSIS — Z78.9 ALTERATION IN INSTRUMENTAL ACTIVITIES OF DAILY LIVING (IADL): ICD-10-CM

## 2023-08-10 PROCEDURE — 97813 ACUP 1/> W/ESTIM 1ST 15 MIN: CPT | Performed by: ACUPUNCTURIST

## 2023-08-10 PROCEDURE — 97112 NEUROMUSCULAR REEDUCATION: CPT

## 2023-08-10 PROCEDURE — 97110 THERAPEUTIC EXERCISES: CPT

## 2023-08-10 PROCEDURE — 77014 PR  CT GUIDANCE PLACEMENT RAD THERAPY FIELDS: CPT | Mod: 26,,, | Performed by: STUDENT IN AN ORGANIZED HEALTH CARE EDUCATION/TRAINING PROGRAM

## 2023-08-10 PROCEDURE — 97530 THERAPEUTIC ACTIVITIES: CPT

## 2023-08-10 PROCEDURE — 97535 SELF CARE MNGMENT TRAINING: CPT

## 2023-08-10 PROCEDURE — 77427 PR CHG RADIATION,MANGEMENT,5 TX'S: ICD-10-PCS | Mod: ,,, | Performed by: STUDENT IN AN ORGANIZED HEALTH CARE EDUCATION/TRAINING PROGRAM

## 2023-08-10 PROCEDURE — 77014 PR  CT GUIDANCE PLACEMENT RAD THERAPY FIELDS: ICD-10-PCS | Mod: 26,,, | Performed by: STUDENT IN AN ORGANIZED HEALTH CARE EDUCATION/TRAINING PROGRAM

## 2023-08-10 PROCEDURE — 97814 ACUP 1/> W/ESTIM EA ADDL 15: CPT | Performed by: ACUPUNCTURIST

## 2023-08-10 PROCEDURE — 77385 HC IMRT, SIMPLE: CPT | Performed by: STUDENT IN AN ORGANIZED HEALTH CARE EDUCATION/TRAINING PROGRAM

## 2023-08-10 PROCEDURE — 77427 RADIATION TX MANAGEMENT X5: CPT | Mod: ,,, | Performed by: STUDENT IN AN ORGANIZED HEALTH CARE EDUCATION/TRAINING PROGRAM

## 2023-08-10 NOTE — PROGRESS NOTES
Acupuncture Evaluation Note     Name: Jose Fischer  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    Night sweats Yes        Evaluation Date: 8/10/2023    Visit #/Visits authorized: self pay    Precautions: Standard and cancer    Subjective     Chief Concern: hot flashes / night sweats, anxiety/depression    Medical necessity is demonstrated by the following IMPAIRMENTS: Medical Necessity: Decreased quality of life              Aggravating Factors:  heat and stress   Relieving Factors:  rest    Symptom Description:     Quality:  Hot  Severity:  6  Frequency:  every evening    Treatment     Treatment Principles:  tonify yin, calm dickerson    Acupuncture points used:  4 MENSAH, Du20, ER SHEMAR, Gb34, Ht7, Pc6, Lu9 , Kd10, Li11, DICKERSON MEN, Sp6, St36, and YIN PAIZ    Needles In: 21  Needles Out: 21  [place] W/ STIM placed: 8:40 AM  [place] W/ STIM removed: 9:10 AM        Assessment     After treatment, patient felt good, noticing a good decrease in emotional symptoms but night sweats were bad last night. Heat doesn't help, continue with  care     Patient prognosis is Good.     Patient will continue to benefit from acupuncture treatment to address the deficits listed in the problem list box on initial evaluation, provide patient family education and to maximize pt's level of independence in the home and community environment.     Patient's spiritual, cultural and educational needs considered and pt agreeable to plan of care and goals.     Anticipated barriers to treatment: none / radiation    Plan     Recommend 1 /week for 3 more sessions then re-assess.      Education:  Patient is aware of cumulative benefit of acupuncture

## 2023-08-11 ENCOUNTER — DOCUMENTATION ONLY (OUTPATIENT)
Dept: RADIATION ONCOLOGY | Facility: CLINIC | Age: 63
End: 2023-08-11
Payer: COMMERCIAL

## 2023-08-11 PROCEDURE — 77014 PR  CT GUIDANCE PLACEMENT RAD THERAPY FIELDS: CPT | Mod: 26,,, | Performed by: STUDENT IN AN ORGANIZED HEALTH CARE EDUCATION/TRAINING PROGRAM

## 2023-08-11 PROCEDURE — 77014 PR  CT GUIDANCE PLACEMENT RAD THERAPY FIELDS: ICD-10-PCS | Mod: 26,,, | Performed by: STUDENT IN AN ORGANIZED HEALTH CARE EDUCATION/TRAINING PROGRAM

## 2023-08-11 PROCEDURE — 77385 HC IMRT, SIMPLE: CPT | Performed by: STUDENT IN AN ORGANIZED HEALTH CARE EDUCATION/TRAINING PROGRAM

## 2023-08-11 NOTE — PLAN OF CARE
Day 22 of outpatient radiation to the prostate. Doing well.  Still with mild burning upon urination. Frequent urination improving with Ibuprofen.

## 2023-08-14 ENCOUNTER — PATIENT MESSAGE (OUTPATIENT)
Dept: HEMATOLOGY/ONCOLOGY | Facility: CLINIC | Age: 63
End: 2023-08-14
Payer: COMMERCIAL

## 2023-08-14 PROCEDURE — 77014 PR  CT GUIDANCE PLACEMENT RAD THERAPY FIELDS: CPT | Mod: 26,,, | Performed by: STUDENT IN AN ORGANIZED HEALTH CARE EDUCATION/TRAINING PROGRAM

## 2023-08-14 PROCEDURE — 77385 HC IMRT, SIMPLE: CPT | Performed by: STUDENT IN AN ORGANIZED HEALTH CARE EDUCATION/TRAINING PROGRAM

## 2023-08-14 PROCEDURE — 77014 PR  CT GUIDANCE PLACEMENT RAD THERAPY FIELDS: ICD-10-PCS | Mod: 26,,, | Performed by: STUDENT IN AN ORGANIZED HEALTH CARE EDUCATION/TRAINING PROGRAM

## 2023-08-14 NOTE — PROGRESS NOTES
Acupuncture Evaluation Note     Name: Jose Fischer  [facility] Number: [id number]    Traditional Chinese Medicine (TCM) Diagnosis: Qi Stagnation, Blood Stasis, Liver Yang Rising, and Yin Deficiency  Medical Diagnosis:   Encounter Diagnoses   Name Primary?    Androgen deprivation therapy Yes    Migraine without status migrainosus, not intractable, unspecified migraine type     Depression, unspecified depression type     Night sweats     Prostate cancer         Evaluation Date: 07/20/2023    Visit #/Visits authorized: 12    Precautions: Standard and cancer    Subjective     Chief Concern: joint pain, hot flashes / night sweats, anxiety/depression    Medical necessity is demonstrated by the following IMPAIRMENTS: Medical Necessity: Decreased mobility limits day to day activities, social, and emergent situations and Decreased quality of life              Aggravating Factors:  heat and stress   Relieving Factors:  rest    Symptom Description:     Quality:  Hot  Severity:  5  Frequency:  continuously    Treatment     Treatment Principles:  tonify yin, move qi and blood, calm dickerson    Acupuncture points used:  4 MENSAH, Du20, ER SHEMAR, Gb34, Ht7, Pc6, Lu9 , Kd10, Li11, DICKERSON MEN, Sp6, St36, and YIN PAIZ    Needles In: 24  Needles Out: 24  [place] W/ STIM placed: 11:15 AM  Needles W/ STIM removed: 11:45 AM        Assessment     After treatment, patient felt better, noticing a decrease week over week with symptoms. Continue with care for 2 more visits then as needed     Patient prognosis is Good.     Patient will continue to benefit from acupuncture treatment to address the deficits listed in the problem list box on initial evaluation, provide patient family education and to maximize pt's level of independence in the home and community environment.     Patient's spiritual, cultural and educational needs considered and pt agreeable to plan of care and goals.     Anticipated barriers to treatment: none / radiation     Plan      Recommend 1 /week for 2 more sessions then re-assess.      Education:  Patient is aware of cumulative benefit of acupuncture

## 2023-08-15 ENCOUNTER — TELEPHONE (OUTPATIENT)
Dept: HEMATOLOGY/ONCOLOGY | Facility: CLINIC | Age: 63
End: 2023-08-15

## 2023-08-15 ENCOUNTER — DOCUMENTATION ONLY (OUTPATIENT)
Dept: RADIATION ONCOLOGY | Facility: CLINIC | Age: 63
End: 2023-08-15
Payer: COMMERCIAL

## 2023-08-15 PROCEDURE — 77385 HC IMRT, SIMPLE: CPT | Performed by: STUDENT IN AN ORGANIZED HEALTH CARE EDUCATION/TRAINING PROGRAM

## 2023-08-15 PROCEDURE — 77014 PR  CT GUIDANCE PLACEMENT RAD THERAPY FIELDS: ICD-10-PCS | Mod: 26,,, | Performed by: STUDENT IN AN ORGANIZED HEALTH CARE EDUCATION/TRAINING PROGRAM

## 2023-08-15 PROCEDURE — 77014 PR  CT GUIDANCE PLACEMENT RAD THERAPY FIELDS: CPT | Mod: 26,,, | Performed by: STUDENT IN AN ORGANIZED HEALTH CARE EDUCATION/TRAINING PROGRAM

## 2023-08-16 PROCEDURE — 77385 HC IMRT, SIMPLE: CPT | Performed by: STUDENT IN AN ORGANIZED HEALTH CARE EDUCATION/TRAINING PROGRAM

## 2023-08-16 PROCEDURE — 77336 RADIATION PHYSICS CONSULT: CPT | Performed by: STUDENT IN AN ORGANIZED HEALTH CARE EDUCATION/TRAINING PROGRAM

## 2023-08-16 PROCEDURE — 77014 PR  CT GUIDANCE PLACEMENT RAD THERAPY FIELDS: ICD-10-PCS | Mod: 26,,, | Performed by: STUDENT IN AN ORGANIZED HEALTH CARE EDUCATION/TRAINING PROGRAM

## 2023-08-16 PROCEDURE — 77014 PR  CT GUIDANCE PLACEMENT RAD THERAPY FIELDS: CPT | Mod: 26,,, | Performed by: STUDENT IN AN ORGANIZED HEALTH CARE EDUCATION/TRAINING PROGRAM

## 2023-08-16 NOTE — PROGRESS NOTES
"[facility]  Occupational Therapy Treatment Note - Therapeutic Yoga Progam    Date: 8/17/2023  Name: Jose Fischer  Clinic Number: [id number]    Therapy Diagnosis:   Encounter Diagnoses   Name Primary?    Stress and adjustment reaction Yes    Decreased functional mobility     Alteration in instrumental activities of daily living (IADL)        Physician: Mikaela Aiken, *    Physician Orders: Eval and Treat   Medical Diagnosis from Referral: Joint pain [M25.50], Depression [F32.A], Low back pain [M54.50]  Evaluation Date: 7/13/2023  Authorization Period Expiration: 12/31/2023  Plan of Care Expiration: 11/10/2023  Progress Note Due: 9/1/2023  Visit # / Visits authorized: 4 / 20 (+eval)     Precautions:  Standard and cancer    Time In: 8:15 AM  Time Out: 9:00 AM  Total Billable Time: 45 minutes    SUBJECTIVE     Pt reports: "I am moving back to [facility] Tuesday 8/22. Monday is my last go round. Im going to miss our time together. The radiation is starting to catch up with me physically so I'm glad to start the recovery process. I am very fatigued. But I have continued my habit of dancing on the sutton and it is so liberating. I am doing that morning and night"    He was compliant with home exercise program given last session.   Response to previous treatment: good  Functional change: TBE    Pain: 0/10  Location: na    OBJECTIVE     Objective Measures updated at progress report unless specified.    Patient Specific Functional Scale:           Activity 7/13/23           Emotional control  3           2. Improved concentration for job tasks 4           3. Improve mobility/strength  6           4. Stress management  2           5.             6.             SCORE  3.75              Total Score = Sum of activity scores / number of activities  Minimum Detectable Change (90% CII) for average score = 2 points  Minimum Detectable Change (90% CI) for single activity score = 3 points    Treatment "     Jose received the treatments listed below:     Date 7/19/23 8/2/23 8/10/23 8/17/23     Therapeutic Yoga Exercises / Neuromuscular Re-education 45 minutes  45minutes  30 minutes  30 minutes  minutes  minutes   Seated Yoga   Chair yoga: side bends, shoulder mobility, cat/cow, fold, backbend, twists, single knee to chest, twist, single hamstring stretch with strap and hands on asst, protraction/retraction   chair yoga: side bends, shoulder mobility, cat/cow, fold, backbend, twists, single knee to chest, twist, single hamstring stretch with strap and hands on asst, protraction/retraction Chair yoga: cat/cow, fold, backbend, twists, single knee to chest, twist, chicken wing, single hamstring stretch with hands on asst, forward fold legs together, shoulder mobility flow chair yoga: side bends, shoulder mobility, chicken wing, cat/cow, fold, backbend, twists, goddess with twists, fold legs together, ext side angle <> peaceful warrior, single knee to chest, fig 4>twist, single hamstring stretch     Quadruped   Israel pose      Supine Hip flexor stretch with strap + quad stretch at edge of mat,  On floor:  Windshield wipers, twist, B knees to chest, bound angle, mod happy baby Pelvic tilts x12, Windshield wipers, twist, B knees to chest, wide B knees to chest, mod/half happy baby, supine twist with asst Windshield wipers, B knees to chest, twist, butterfly Windshield wipers, B knees to chest, mod happy baby, twist, butterfly      Prone   cobra x3, mod superman x3, prone chest opener with stacked hips      standing Chair down dog x2 with asst Chair down dog x2 with asst, chair pose with block x3 (int/ext rotation), wall squats x5 Chair down dog x2 with asst, wall sits 2x4                        Self-Care/Home Management  / Therapeutic Activities 15 minutes  15minutes  25 minutes  15 minutes  minutes  minutes            Relaxation techniques DB with tactile cues, guided 3 part breath meditation Guided DB with music, body  "scan DB, pursed lip breathing, sigh breathing DB, pursed lip breathing, guided extended exhale      Restorative  Z-lie on chair Supine bolster under knees Knocked knees supine butterfly     Activity Pacing One breath/one action  Discussed pacing and signs of fatigue, discussed boom/bust cycle  Pain and safety relationship                       Stress Management/Education  Physiology of yoga and immune health   Physiology of yoga and immune health  Physiology of yoga and immune health                   Patient Education and Home Exercises      Education provided:   - physiology of yoga and immune health  - Progress towards goals     Written Home Exercises Provided: yes.  Exercises were reviewed and Jose was able to demonstrate them prior to the end of the session.  Jose demonstrated good  understanding of the HEP provided. See EMR under Patient Instructions for exercises provided during therapy sessions.       Assessment     Jose presents to session with improved energy levels and no pain. He reports he is moving back to [place] after last radiation treatment next Monday. Plan to see OT for one more visit. Continues with report of lower energy levels and increased fatigue 2/2 radiation. Good tolerance to session today. Added seated poses to yoga HEP with good independence and understanding. Continues to note good adherence to yoga HEP. Some dizziness throughout session but controlled and good response to breathing exercises. Good discussion re: pain and safety relationship and mind/body connection. Good continued response to restorative practice and at end of session noted "this is wonderful".   Jose is progressing well towards his goals and there are no updates to goals at this time. Pt prognosis is Good.     Pt will continue to benefit from skilled outpatient occupational therapy to address the deficits listed in the problem list on initial evaluation provide pt/family education and to maximize pt's level of " independence in the home and community environment. Pt's spiritual, cultural and educational needs considered and pt agreeable to plan of care and goals.    Anticipated barriers to occupational therapy: none    Goals:  Short Term Goals: 6 weeks      Goal # Goal Status   1 Patient will be independent with Home Exercise Program to increase endurance and manage stress. Progressing   2 Patient will demonstrate independence with diaphragmatic breathing in sit and supine. Progressing   3 Patient will identify 2 new stress coping skills for stress management/immune health. Progressing   4 Patient will identify activity pacing problems.  Patient will then implement new plan for daily activity to increase endurance for ADL's. Progressing      Long Term Goals: 12 weeks      Goal # Goal Status   1 Patient will demonstrate independence with yoga Home Exercise Program to increase strength and endurance for ADL's. Progressing   2 Patient will demonstrate independence with relaxation techniques to manage stress for immune health. Progressing   3 Patient will verbalize good understanding of stress/immune health relationship.  Progressing   4            PLAN     Plan of care Certification: 7/13/2023 to 11/10/2023.     Outpatient Occupational Therapy 1 times weekly for 12 weeks to include the following interventions: Manual Therapy, Neuromuscular Re-ed, Patient Education, Self Care, Therapeutic Activities, and Therapeutic Exercise.     Diane Johnson, OT

## 2023-08-17 ENCOUNTER — CLINICAL SUPPORT (OUTPATIENT)
Dept: REHABILITATION | Facility: HOSPITAL | Age: 63
End: 2023-08-17
Payer: COMMERCIAL

## 2023-08-17 DIAGNOSIS — F43.29 STRESS AND ADJUSTMENT REACTION: Primary | ICD-10-CM

## 2023-08-17 DIAGNOSIS — R26.89 DECREASED FUNCTIONAL MOBILITY: ICD-10-CM

## 2023-08-17 DIAGNOSIS — C61 PROSTATE CANCER: ICD-10-CM

## 2023-08-17 DIAGNOSIS — Z78.9 ALTERATION IN INSTRUMENTAL ACTIVITIES OF DAILY LIVING (IADL): ICD-10-CM

## 2023-08-17 DIAGNOSIS — R61 NIGHT SWEATS: ICD-10-CM

## 2023-08-17 PROCEDURE — 77385 HC IMRT, SIMPLE: CPT | Performed by: STUDENT IN AN ORGANIZED HEALTH CARE EDUCATION/TRAINING PROGRAM

## 2023-08-17 PROCEDURE — 97110 THERAPEUTIC EXERCISES: CPT

## 2023-08-17 PROCEDURE — 77014 PR  CT GUIDANCE PLACEMENT RAD THERAPY FIELDS: CPT | Mod: 26,,, | Performed by: STUDENT IN AN ORGANIZED HEALTH CARE EDUCATION/TRAINING PROGRAM

## 2023-08-17 PROCEDURE — 77014 PR  CT GUIDANCE PLACEMENT RAD THERAPY FIELDS: ICD-10-PCS | Mod: 26,,, | Performed by: STUDENT IN AN ORGANIZED HEALTH CARE EDUCATION/TRAINING PROGRAM

## 2023-08-17 PROCEDURE — 97814 ACUP 1/> W/ESTIM EA ADDL 15: CPT | Performed by: ACUPUNCTURIST

## 2023-08-17 PROCEDURE — 97112 NEUROMUSCULAR REEDUCATION: CPT

## 2023-08-17 PROCEDURE — 97813 ACUP 1/> W/ESTIM 1ST 15 MIN: CPT | Performed by: ACUPUNCTURIST

## 2023-08-17 PROCEDURE — 97535 SELF CARE MNGMENT TRAINING: CPT

## 2023-08-17 NOTE — PROGRESS NOTES
Acupuncture Evaluation Note     Name: Jose Fischer  [facility] Number: [id number]    Traditional Chinese Medicine (TCM) Diagnosis: Qi Stagnation, Blood Stasis, and Yin Deficiency  Medical Diagnosis:   Encounter Diagnoses   Name Primary?    Stress and adjustment reaction Yes    Night sweats     Prostate cancer         Evaluation Date: 8/17/2023    Visit #/Visits authorized: self pay    Precautions: Standard    Subjective     Chief Concern: hot flashes, anxiety/depression    Medical necessity is demonstrated by the following IMPAIRMENTS: Medical Necessity: Decreased mobility limits day to day activities, social, and emergent situations and Decreased quality of life              Aggravating Factors:  stress   Relieving Factors:  rest    Symptom Description:     Quality:  Variable  Severity:  1  Frequency:  continuously    Treatment     Treatment Principles:  calm dickerson, tonify yin     Acupuncture points used:  4 MENSAH, Du20, Gb34, Ht7, Pc6, Lu9 , Kd10, Ki3, Li11, DICKERSON MEN, Sp10, Sp6, Sp9, SSC, St25, St36, and YIN PAIZ    Needles In: 26  Needles Out: 26  [place] W/ STIM placed: 9:35 AM  Needles W/ STIM removed: 10:05 AM        Assessment     After treatment, patient felt well ,did great with acupuncture and symptoms have been managed.      Patient prognosis is Good.     Patient will continue to benefit from acupuncture treatment to address the deficits listed in the problem list box on initial evaluation, provide patient family education and to maximize pt's level of independence in the home and community environment.     Patient's spiritual, cultural and educational needs considered and pt agreeable to plan of care and goals.     Anticipated barriers to treatment: none / radiation     Plan     Recommend as needed. Patient completing care and moving back to [facility]    Education:  Patient is aware of cumulative benefit of acupuncture

## 2023-08-18 PROCEDURE — 77014 PR  CT GUIDANCE PLACEMENT RAD THERAPY FIELDS: CPT | Mod: 26,,, | Performed by: STUDENT IN AN ORGANIZED HEALTH CARE EDUCATION/TRAINING PROGRAM

## 2023-08-18 PROCEDURE — 77385 HC IMRT, SIMPLE: CPT | Performed by: STUDENT IN AN ORGANIZED HEALTH CARE EDUCATION/TRAINING PROGRAM

## 2023-08-18 PROCEDURE — 77014 PR  CT GUIDANCE PLACEMENT RAD THERAPY FIELDS: ICD-10-PCS | Mod: 26,,, | Performed by: STUDENT IN AN ORGANIZED HEALTH CARE EDUCATION/TRAINING PROGRAM

## 2023-08-21 PROCEDURE — 77336 RADIATION PHYSICS CONSULT: CPT | Performed by: STUDENT IN AN ORGANIZED HEALTH CARE EDUCATION/TRAINING PROGRAM

## 2023-08-21 PROCEDURE — 77014 PR  CT GUIDANCE PLACEMENT RAD THERAPY FIELDS: CPT | Mod: 26,,, | Performed by: STUDENT IN AN ORGANIZED HEALTH CARE EDUCATION/TRAINING PROGRAM

## 2023-08-21 PROCEDURE — 77014 PR  CT GUIDANCE PLACEMENT RAD THERAPY FIELDS: ICD-10-PCS | Mod: 26,,, | Performed by: STUDENT IN AN ORGANIZED HEALTH CARE EDUCATION/TRAINING PROGRAM

## 2023-08-21 PROCEDURE — 77385 HC IMRT, SIMPLE: CPT | Performed by: STUDENT IN AN ORGANIZED HEALTH CARE EDUCATION/TRAINING PROGRAM

## 2023-08-22 ENCOUNTER — OFFICE VISIT (OUTPATIENT)
Dept: PSYCHIATRY | Facility: CLINIC | Age: 63
End: 2023-08-22
Payer: COMMERCIAL

## 2023-08-22 DIAGNOSIS — F43.23 ADJUSTMENT DISORDER WITH MIXED ANXIETY AND DEPRESSED MOOD: Primary | ICD-10-CM

## 2023-08-22 PROCEDURE — 90832 PSYTX W PT 30 MINUTES: CPT | Mod: S$GLB,,, | Performed by: CASE MANAGER/CARE COORDINATOR

## 2023-08-22 PROCEDURE — 90832 PR PSYCHOTHERAPY W/PATIENT, 30 MIN: ICD-10-PCS | Mod: S$GLB,,, | Performed by: CASE MANAGER/CARE COORDINATOR

## 2023-08-22 NOTE — PROGRESS NOTES
PSYCHO-ONCOLOGY NOTE/ Individual Psychotherapy     Date: 8/22/2023   Site:  [facility]    Name: Jose Fischer     Therapeutic Intervention: Met with patient.  Outpatient - Insight oriented psychotherapy 30 min - CPT code 05683, Outpatient - Behavior modifying psychotherapy 30 min - CPT code 68060, and Outpatient - Supportive psychotherapy 30 min - CPT Code 97434    This includes face to face time and non-face to face time preparing to see the patient, obtaining and/or reviewing separately obtained history, documenting clinical information in the electronic or other health record, independently interpreting results and communicating results to the patient/family/caregiver, or care coordinator.      Jose Fischer is a 63 y.o. White male who was last seen by me on 7/27/2023.  INFORMED CONSENT: Patient was identified using two patient-identifiers. The patient has been informed of the risks and benefits associated with engaging in psychotherapy, the handling of protected health information, the rights of privacy and the limits of confidentiality. The patient has also been informed of the importance of reporting any suicidal or homicidal ideation to this or any provider to ensure safety of all parties, and the Jose Fischer expressed understanding. The patient was agreeable to these terms and freely participates in individual psychotherapy.    Problem list  Patient Active Problem List   Diagnosis    Elevated prostate specific antigen (PSA)    BPH with urinary obstruction    Prostate cancer    Stress and adjustment reaction    Decreased functional mobility    Alteration in instrumental activities of daily living (IADL)       Chief complaint/reason for encounter: depression and anxiety   Met with patient to evaluate psychosocial adaptation to diagnosis/treatment/survivorship of prostate cancer    Current Medications  Current Outpatient Medications   Medication    buPROPion (WELLBUTRIN XL) 150 MG TB24 tablet     "sildenafiL (VIAGRA) 50 MG tablet    tamsulosin (FLOMAX) 0.4 mg Cap     No current facility-administered medications for this visit.       ONCOLOGY HISTORY  Oncology History   Prostate cancer   2/14/2023 Cancer Staged    Staging form: Prostate, AJCC 8th Edition  - Clinical stage from 2/14/2023: Stage IIC (cT2b, cN0, cM0, PSA: 17.7, Grade Group: 3)     3/14/2023 Initial Diagnosis    Prostate cancer     4/11/2023 Genetic Testing    [facility] 91-gene panel: Negative.          Objective:  Jose Fischer arrived promptly for the session.  Mr. Fischer was independently ambulatory at the time of session. The patient was fully cooperative throughout the session.  Appearance: age appropriate, appropriately  dressed, well groomed  Behavior/Cooperation: friendly and cooperative  Speech: normal in rate, volume, and tone and appropriate quality, quantity and organization of sentences  Mood: steady, calm  Affect: mood congruent and appropriate  Thought Process: goal-directed, logical  Thought Content: normal,  No delusions or paranoia; did not appear to be responding to internal stimuli during the session  Orientation: grossly intact  Memory: grossly intact  Attention Span/Concentration: Attends to session without distraction; reports no difficulty  Fund of Knowledge: above average  Estimate of Intelligence: above average from verbal skills and history  Cognition: grossly intact  Insight: patient has awareness of illness; good insight into own behavior and behavior of others  Judgment: the patient's behavior is adequate to circumstances      Interval history and content of current session: Patient discussed events and activities since the time of last visit.  Patient noted he has been feeling "much better" recently.  Discussed  treatment status . Reports to be coping  better at this time . Evaluated cognitive response, paying particular attention to negative intrusive thoughts of a persistent and detrimental nature. Patient noted he " completed radiation yesterday and will not have another dose of Lupron at this time. Patient noted decreased feelings of anxiety and that he has been feeling better overall. Patient did note that he has noticed poor appetite and decreased energy. Patient also stated difficulties with sleep as he wakes up during the night to go to restroom but reported that he is able to fall back to sleep. Patient noted that he is hopeful this will improve. Discussed coping skills with patient. Patient stated he has not been riding bike as much but noted that he plans on starting to walk in AM with a friend. Patient reported that he does find that he obsess over thoughts at times including ones from the past. Provided cognitive behavioral therapy to address negative cognitions. Discussed with patient assertive communication skills may be helpful for patient to express his thoughts in feelings more effectively in the moment. Patient requested follow-up for in about a month.     Risk parameters:   Patient reports no suicidal ideation  Patient reports no homicidal ideation  Patient reports no self-injurious behavior  Patient reports no violent behavior     Safety needs:  None at this time      Verbal deficits: None     Patient's response to intervention:The patient's response to intervention is accepting, motivated.     Progress toward goals and other mental status changes:  The patient's progress toward goals is good.      Progress to date:Progress as Expected      Goals from last visit: Attempted, partially met        Patient Strengths: verbal, intelligent, successful, good social support, good insight, and commitment to wellness    Patient reported outcomes:      NCCN Distress thermometer:   DISTRESS SCREENING 8/22/2023 7/28/2023 7/28/2023 7/28/2023 7/28/2023 7/27/2023 7/27/2023   Distress Score 0 - No Distress 5 6 6 6 8 6   Practical Problems None of these - Insurance/Financial;Work/School Insurance/Financial;Work/School  Insurance/Financial;Work/School Insurance/Financial Insurance/Financial;Work/School   Family Problems None of these - None of these None of these None of these None of these None of these   Emotional Problems None of these - Fears;Nervousness Fears;Nervousness Fears;Nervousness Fears;Sadness;Worry Fears;Nervousness   Spiritual / Religion Concerns No - No No No No No   Physical Problems Changes in urination;Diarrhea;Eating;Fatigue;Nausea;Skin Dry/Itchy;Sleep - Fatigue Fatigue Fatigue Fatigue;Sleep Fatigue   Other Problems - - - - - - -           PHQ-9= Initial visit: 11    PHQ ANSWERS    Over the last 2 weeks, how often have you been bothered by any of the following problems?  Little interest or pleasure in doing things: Not at all  Feeling down, depressed, or hopeless: Several days  Trouble falling or staying asleep, or sleeping too much: Nearly every day  Feeling tired or having little energy: Nearly every day  Poor appetite or overeating: Nearly every day  Feeling bad about yourself - or that you are a failure or have let yourself or your family down: Several days  Trouble concentrating on things, such as reading the newspaper or watching television: More than half the days  Moving or speaking so slowly that other people could have noticed. Or the opposite - being so fidgety or restless that you have been moving around a lot more than usual: Not at all  Thoughts that you would be better off dead, or of hurting yourself in some way: Not at all  PHQ-9 Total Score: 13  If you checked off any problems, how difficult have these problems made it for you to do your work, take care of things at home, or get along with other people?: Somewhat difficult    PHQ8 Score : 13 (08/22/23 0843)  PHQ-9 Total Score: 13 (08/22/23 0843)      PIPE-7= Initial visit: 6   GAD7 8/22/2023 7/27/2023 7/10/2023   1. Feeling nervous, anxious, or on edge? 1 3 3   2. Not being able to stop or control worrying? 0 1 1   3. Worrying too much about  different things? 0 0 1   4. Trouble relaxing? 0 0 0   5. Being so restless that it is hard to sit still? 0 0 1   6. Becoming easily annoyed or irritable? 1 3 3   7. Feeling afraid as if something awful might happen? 1 3 3   PIPE-7 Score 3 10 12        Treatment Plan:individual psychotherapy  Target symptoms: depression, anxiety , adjustment  Why chosen therapy is appropriate versus another modality: relevant to diagnosis, patient responds to this modality, evidence based practice  Outcome monitoring methods: self-report, checklist/rating scale  Therapeutic intervention type: insight oriented psychotherapy, behavior modifying psychotherapy, supportive psychotherapy  Prognosis: Good      Behavioral goals:    Exercise: Patient plans on walking in mornings   Stress management: Continue practicing coping skills   Therapy: Continue practicing restructuring unhelpful thoughts    Return to clinic: 1 month     Length of Service (minutes direct face-to-face contact): 30    Diagnosis:     ICD-10-CM ICD-9-CM   1. Adjustment disorder with mixed anxiety and depressed mood  F43.23 309.28           Ritchie Luevano Psy.D.  Clinical Psychologist  [place] License #[id number]  [place] License #[id number]

## 2023-08-25 NOTE — PLAN OF CARE
Day 24 of outpatient radiation to prostate. Tolerating treatment well. Denies hematuria and nocturia.

## 2023-09-07 ENCOUNTER — DOCUMENTATION ONLY (OUTPATIENT)
Dept: RADIATION ONCOLOGY | Facility: CLINIC | Age: 63
End: 2023-09-07
Payer: COMMERCIAL

## 2023-09-07 NOTE — PLAN OF CARE
Completed 28/28 of outpatient radiation to the prostate, on August 21st. Tolerated therapy well. RTC in 3 mos with a PSA, unless symptoms warrant otherwise.

## 2023-09-08 ENCOUNTER — PATIENT MESSAGE (OUTPATIENT)
Dept: HEMATOLOGY/ONCOLOGY | Facility: CLINIC | Age: 63
End: 2023-09-08
Payer: COMMERCIAL

## 2023-09-08 DIAGNOSIS — N52.9 ERECTILE DYSFUNCTION, UNSPECIFIED ERECTILE DYSFUNCTION TYPE: Primary | ICD-10-CM

## 2023-09-08 RX ORDER — SILDENAFIL 50 MG/1
50 TABLET, FILM COATED ORAL
Qty: 20 TABLET | Refills: 1 | Status: SHIPPED | OUTPATIENT
Start: 2023-09-08 | End: 2023-09-14
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
## 2023-09-14 ENCOUNTER — CLINICAL SUPPORT (OUTPATIENT)
Dept: REHABILITATION | Facility: HOSPITAL | Age: 63
End: 2023-09-14
Payer: COMMERCIAL

## 2023-09-14 ENCOUNTER — OFFICE VISIT (OUTPATIENT)
Dept: HEMATOLOGY/ONCOLOGY | Facility: CLINIC | Age: 63
End: 2023-09-14
Payer: COMMERCIAL

## 2023-09-14 ENCOUNTER — PATIENT MESSAGE (OUTPATIENT)
Dept: REHABILITATION | Facility: HOSPITAL | Age: 63
End: 2023-09-14
Payer: COMMERCIAL

## 2023-09-14 ENCOUNTER — LAB VISIT (OUTPATIENT)
Dept: LAB | Facility: HOSPITAL | Age: 63
End: 2023-09-14
Payer: COMMERCIAL

## 2023-09-14 VITALS
BODY MASS INDEX: 19.08 KG/M2 | SYSTOLIC BLOOD PRESSURE: 98 MMHG | RESPIRATION RATE: 16 BRPM | TEMPERATURE: 98 F | WEIGHT: 140.88 LBS | HEART RATE: 69 BPM | DIASTOLIC BLOOD PRESSURE: 62 MMHG | OXYGEN SATURATION: 98 % | HEIGHT: 72 IN

## 2023-09-14 DIAGNOSIS — Z78.9 ALTERATION IN INSTRUMENTAL ACTIVITIES OF DAILY LIVING (IADL): ICD-10-CM

## 2023-09-14 DIAGNOSIS — C61 PROSTATE CANCER: ICD-10-CM

## 2023-09-14 DIAGNOSIS — F43.29 STRESS AND ADJUSTMENT REACTION: Primary | ICD-10-CM

## 2023-09-14 DIAGNOSIS — C61 PROSTATE CANCER: Primary | ICD-10-CM

## 2023-09-14 DIAGNOSIS — R26.89 DECREASED FUNCTIONAL MOBILITY: ICD-10-CM

## 2023-09-14 LAB
ALBUMIN SERPL BCP-MCNC: 4 G/DL (ref 3.5–5.2)
ALP SERPL-CCNC: 53 U/L (ref 55–135)
ALT SERPL W/O P-5'-P-CCNC: 21 U/L (ref 10–44)
ANION GAP SERPL CALC-SCNC: 9 MMOL/L (ref 8–16)
AST SERPL-CCNC: 21 U/L (ref 10–40)
BILIRUB SERPL-MCNC: 0.4 MG/DL (ref 0.1–1)
BUN SERPL-MCNC: 18 MG/DL (ref 8–23)
CALCIUM SERPL-MCNC: 9.5 MG/DL (ref 8.7–10.5)
CHLORIDE SERPL-SCNC: 104 MMOL/L (ref 95–110)
CO2 SERPL-SCNC: 25 MMOL/L (ref 23–29)
COMPLEXED PSA SERPL-MCNC: 0.03 NG/ML (ref 0–4)
CREAT SERPL-MCNC: 0.8 MG/DL (ref 0.5–1.4)
ERYTHROCYTE [DISTWIDTH] IN BLOOD BY AUTOMATED COUNT: 13.9 % (ref 11.5–14.5)
EST. GFR  (NO RACE VARIABLE): >60 ML/MIN/1.73 M^2
GLUCOSE SERPL-MCNC: 103 MG/DL (ref 70–110)
HCT VFR BLD AUTO: 35.6 % (ref 40–54)
HGB BLD-MCNC: 12.4 G/DL (ref 14–18)
IMM GRANULOCYTES # BLD AUTO: 0.01 K/UL (ref 0–0.04)
MCH RBC QN AUTO: 30.2 PG (ref 27–31)
MCHC RBC AUTO-ENTMCNC: 34.8 G/DL (ref 32–36)
MCV RBC AUTO: 87 FL (ref 82–98)
NEUTROPHILS # BLD AUTO: 2.5 K/UL (ref 1.8–7.7)
PLATELET # BLD AUTO: 187 K/UL (ref 150–450)
PMV BLD AUTO: 9.9 FL (ref 9.2–12.9)
POTASSIUM SERPL-SCNC: 4.2 MMOL/L (ref 3.5–5.1)
PROT SERPL-MCNC: 6.5 G/DL (ref 6–8.4)
RBC # BLD AUTO: 4.1 M/UL (ref 4.6–6.2)
SODIUM SERPL-SCNC: 138 MMOL/L (ref 136–145)
TESTOST SERPL-MCNC: 11 NG/DL (ref 304–1227)
WBC # BLD AUTO: 3.32 K/UL (ref 3.9–12.7)

## 2023-09-14 PROCEDURE — 3078F DIAST BP <80 MM HG: CPT | Mod: CPTII,S$GLB,, | Performed by: INTERNAL MEDICINE

## 2023-09-14 PROCEDURE — 99215 OFFICE O/P EST HI 40 MIN: CPT | Mod: S$GLB,,, | Performed by: INTERNAL MEDICINE

## 2023-09-14 PROCEDURE — 97535 SELF CARE MNGMENT TRAINING: CPT

## 2023-09-14 PROCEDURE — 84403 ASSAY OF TOTAL TESTOSTERONE: CPT | Performed by: NURSE PRACTITIONER

## 2023-09-14 PROCEDURE — 3074F SYST BP LT 130 MM HG: CPT | Mod: CPTII,S$GLB,, | Performed by: INTERNAL MEDICINE

## 2023-09-14 PROCEDURE — 99999 PR PBB SHADOW E&M-EST. PATIENT-LVL III: ICD-10-PCS | Mod: PBBFAC,,, | Performed by: INTERNAL MEDICINE

## 2023-09-14 PROCEDURE — 3008F BODY MASS INDEX DOCD: CPT | Mod: CPTII,S$GLB,, | Performed by: INTERNAL MEDICINE

## 2023-09-14 PROCEDURE — 99215 PR OFFICE/OUTPT VISIT, EST, LEVL V, 40-54 MIN: ICD-10-PCS | Mod: S$GLB,,, | Performed by: INTERNAL MEDICINE

## 2023-09-14 PROCEDURE — 36415 COLL VENOUS BLD VENIPUNCTURE: CPT | Performed by: NURSE PRACTITIONER

## 2023-09-14 PROCEDURE — 85027 COMPLETE CBC AUTOMATED: CPT | Performed by: NURSE PRACTITIONER

## 2023-09-14 PROCEDURE — 97112 NEUROMUSCULAR REEDUCATION: CPT

## 2023-09-14 PROCEDURE — 3074F PR MOST RECENT SYSTOLIC BLOOD PRESSURE < 130 MM HG: ICD-10-PCS | Mod: CPTII,S$GLB,, | Performed by: INTERNAL MEDICINE

## 2023-09-14 PROCEDURE — 84153 ASSAY OF PSA TOTAL: CPT | Performed by: NURSE PRACTITIONER

## 2023-09-14 PROCEDURE — 3008F PR BODY MASS INDEX (BMI) DOCUMENTED: ICD-10-PCS | Mod: CPTII,S$GLB,, | Performed by: INTERNAL MEDICINE

## 2023-09-14 PROCEDURE — 80053 COMPREHEN METABOLIC PANEL: CPT | Performed by: NURSE PRACTITIONER

## 2023-09-14 PROCEDURE — 1159F PR MEDICATION LIST DOCUMENTED IN MEDICAL RECORD: ICD-10-PCS | Mod: CPTII,S$GLB,, | Performed by: INTERNAL MEDICINE

## 2023-09-14 PROCEDURE — 3078F PR MOST RECENT DIASTOLIC BLOOD PRESSURE < 80 MM HG: ICD-10-PCS | Mod: CPTII,S$GLB,, | Performed by: INTERNAL MEDICINE

## 2023-09-14 PROCEDURE — 99999 PR PBB SHADOW E&M-EST. PATIENT-LVL III: CPT | Mod: PBBFAC,,, | Performed by: INTERNAL MEDICINE

## 2023-09-14 PROCEDURE — 97110 THERAPEUTIC EXERCISES: CPT

## 2023-09-14 PROCEDURE — 1159F MED LIST DOCD IN RCRD: CPT | Mod: CPTII,S$GLB,, | Performed by: INTERNAL MEDICINE

## 2023-09-14 RX ORDER — SILDENAFIL 25 MG/1
25 TABLET, FILM COATED ORAL DAILY PRN
Qty: 30 TABLET | Refills: 2 | OUTPATIENT
Start: 2023-09-14 | End: 2023-09-14 | Stop reason: SDUPTHER

## 2023-09-14 RX ORDER — SILDENAFIL 25 MG/1
25 TABLET, FILM COATED ORAL DAILY PRN
Qty: 30 TABLET | Refills: 2 | Status: SHIPPED | OUTPATIENT
Start: 2023-09-14 | End: 2023-09-14 | Stop reason: SDUPTHER

## 2023-09-14 RX ORDER — SILDENAFIL 25 MG/1
25 TABLET, FILM COATED ORAL DAILY PRN
Qty: 30 TABLET | Refills: 2 | Status: SHIPPED | OUTPATIENT
Start: 2023-09-14 | End: 2023-09-26

## 2023-09-14 RX ORDER — ACYCLOVIR 800 MG/1
800 TABLET ORAL
COMMUNITY
Start: 2023-08-30 | End: 2024-01-10

## 2023-09-14 NOTE — PROGRESS NOTES
[facility] - PATIENT VISIT     Best Contact Phone Number(s): [phone] (home)       Cancer/Stage/TNM:    Cancer Staging   Prostate cancer  Staging form: Prostate, AJCC 8th Edition  - Clinical stage from 2/14/2023: Stage IIC (cT2b, cN0, cM0, PSA: 17.7, Grade Group: 3) - Signed by Patricia Willson NP on 4/28/2023        Reason for visit:   Prostate Cancer     HPI:    Jose Fischer is a 63 y.o. male, to clinic for follow up now s/p XRT for localized prostate cancer.  Now off ADT due to severe AEs.  Feeling better, still with some hot flashes.       History has been obtained by chart review and discussion with the patient.       Oncology History   Prostate cancer   2/14/2023 Cancer Staged    Staging form: Prostate, AJCC 8th Edition  - Clinical stage from 2/14/2023: Stage IIC (cT2b, cN0, cM0, PSA: 17.7, Grade Group: 3)     3/14/2023 Initial Diagnosis    Prostate cancer     4/11/2023 Genetic Testing    [facility] 91-gene panel: Negative.            Past Medical History:   Diagnosis Date    Depression     Prostate cancer          Past Surgical History:   Procedure Laterality Date    PROSTATE BIOPSY  02/14/2023         I have reviewed and updated the patient's past medical, surgical, family and social histories.     Review of patient's allergies indicates:  No Known Allergies      Current Outpatient Medications   Medication Sig Dispense Refill    buPROPion (WELLBUTRIN XL) 150 MG TB24 tablet Take 1 tablet (150 mg total) by mouth once daily. 30 tablet 11    sildenafiL (VIAGRA) 50 MG tablet Take 1 tablet (50 mg total) by mouth as needed for Erectile Dysfunction. 20 tablet 1    tamsulosin (FLOMAX) 0.4 mg Cap Take 1 capsule (0.4 mg total) by mouth nightly. 90 capsule 3     No current facility-administered medications for this visit.        Objective:      Physical Exam:   BP 98/62 (BP Location: Left arm, Patient Position: Sitting, BP Method: Medium (Automatic))   Pulse 69   Temp 98.1 °F (36.7 °C) (Oral)    Resp 16   Ht 6' (1.829 m)   Wt 63.9 kg (140 lb 14 oz)   SpO2 98%   BMI 19.11 kg/m²       ECOG Performance status: (0) Fully active, able to carry on all predisease performance without restriction     Physical Exam  Constitutional:       Appearance: Normal appearance.      Comments: Thin appearance   HENT:      Head: Normocephalic and atraumatic.   Pulmonary:      Effort: Pulmonary effort is normal.   Skin:     General: Skin is warm and dry.   Neurological:      Mental Status: He is alert.   Psychiatric:         Mood and Affect: Mood normal.         Behavior: Behavior normal.         Thought Content: Thought content normal.         Judgment: Judgment normal.          Recent Labs:   Lab Results   Component Value Date    WBC 3.98 07/28/2023    RBC 4.52 (L) 07/28/2023    HGB 13.2 (L) 07/28/2023    HCT 38.1 (L) 07/28/2023    MCV 84 07/28/2023    MCH 29.2 07/28/2023    MCHC 34.6 07/28/2023    RDW 12.6 07/28/2023     07/28/2023    MPV 9.8 07/28/2023    GRAN 2.3 07/28/2023    IGABS 0.02 07/28/2023       Lab Results   Component Value Date     07/28/2023    K 3.8 07/28/2023     07/28/2023    CO2 26 07/28/2023     (H) 07/28/2023    BUN 12 07/28/2023    CREATININE 0.7 07/28/2023    CALCIUM 9.5 07/28/2023    PROT 6.5 07/28/2023    ALBUMIN 3.7 07/28/2023    BILITOT 0.4 07/28/2023    ALKPHOS 54 (L) 07/28/2023    AST 24 07/28/2023    ALT 26 07/28/2023    ANIONGAP 10 07/28/2023    EGFRNORACEVR >60.0 07/28/2023        Lab Results   Component Value Date    PSADIAG 0.06 07/28/2023    PSADIAG 18.8 (H) 04/28/2023    PSADIAG 22.1 (H) 04/04/2023        Cardiovascular Screening:  Primary care physician: Annabelle Garcia MD      The ASCVD Risk score (Boris BASSETT, et al., 2019) failed to calculate for the following reasons:    Cannot find a previous HDL lab    Cannot find a previous total cholesterol lab    ASCVD Risk Level: N/A    EKG: No results found for this or any previous visit.    High blood pressure =  "No  Antihypertensive agents: This patient does not have an active medication from one of the medication groupers.   Comments:     DM2: No  Antidiabetic agents: This patient does not have an active medication from one of the medication groupers.   Comments:     Hyperlipidemia: No  Lipid lowering agents: This patient does not have an active medication from one of the medication groupers.   Comments:     Antiplatelet therapy: No  Agent: This patient does not have an active medication from one of the medication groupers.   Comment:     There is no height or weight on file to calculate BMI.       Bone Health    No results found for: "[id number]"     No results found for this or any previous visit.       Vitamin D: 1000 IU daily  Calcium: Recommend 4 servings of dairy daily     Osteopenia/Osteoporosis: Unknown    Bone strengthening agent: None     Staging Imaging     Results for orders placed during the hospital encounter of 03/31/23    NM PET CT F 18 PYL PSMA, Midthigh to Vertex    Impression  Focal prostatic radiotracer avid lesion correlating with known malignancy.    No evidence of regional or distant radiotracer avid metastatic disease.    I, Sourav Vargas MD, attest that I reviewed and interpreted the images.    Electronically signed by resident: Cortes Sen  Date:    03/31/2023  Time:    15:23    Electronically signed by: Sourav Vargas  Date:    03/31/2023  Time:    15:42       No results found for this or any previous visit.      No results found for this or any previous visit.       No results found for this or any previous visit.       I have personally reviewed the above imaging.     Path:   Reviewed pathology as documented above.    Genomic testing:     Germline genetic testing  Results for orders placed or performed in visit on 04/11/23   Genetic Misc Sendout Test, Blood   Result Value Ref Range    Miscellaneous Genetic Test Name Moses BRCA + CustomNext-Cancer + RNA     Genso Specimen Type Blood     Genetic " counseling? Yes     Parental or Sibling Testing? No     Test Result See result image under hyperlink     Reference Lab SEE COMMENT         Somatic tumor genotyping:      ctDNA genotyping:       Diagnoses:     1. Prostate cancer            Assessment and Plan:      Cancer Staging   Prostate cancer  Staging form: Prostate, AJCC 8th Edition  - Clinical stage from 2/14/2023: Stage IIC (cT2b, cN0, cM0, PSA: 17.7, Grade Group: 3) - Signed by Patricia Willson NP on 4/28/2023    Jose Fischer is a 63 y.o. male, with a recent diagnosis of prostate cancer. Patient decided to pursue XRT and ADT. Now s/p XRT.    Given the degree of interference with his daily life and emotional distress with ADT, we held his last dose.   Here today for follow-up.    PSA is 0.03.  Testosterone is still castrate.  Will re-check in 3 mos.      RTC 3 mos to see me with labs (CBC, CMP, PSA, Testosterone).      Patient is in agreement with the proposed treatment plan. All questions were answered to the patient's satisfaction. Pt knows to call clinic if anything is needed before the next clinic visit.    More than 40 mins total time were spent during this encounter.    Ronald Elizabeth M.D., M.S., F.A.C.P.  Hematology and Oncology Attending  [facility]          Follow up:   Route Chart for Scheduling    Med Onc Chart Routing      Follow up with physician 3 months. RTC 3 mos to see me with labs (CBC, CMP, PSA, Testosterone).   Follow up with BLAISE    Infusion scheduling note    Injection scheduling note    Labs CBC, CMP and PSA   Scheduling:  Preferred lab:  Lab interval:  and testoserone   Imaging    Pharmacy appointment    Other referrals

## 2023-09-19 ENCOUNTER — PATIENT MESSAGE (OUTPATIENT)
Dept: HEMATOLOGY/ONCOLOGY | Facility: CLINIC | Age: 63
End: 2023-09-19
Payer: COMMERCIAL

## 2023-09-25 ENCOUNTER — PATIENT MESSAGE (OUTPATIENT)
Dept: HEMATOLOGY/ONCOLOGY | Facility: CLINIC | Age: 63
End: 2023-09-25
Payer: COMMERCIAL

## 2023-09-25 DIAGNOSIS — C61 PROSTATE CANCER: Primary | ICD-10-CM

## 2023-09-25 DIAGNOSIS — N52.9 ERECTILE DYSFUNCTION, UNSPECIFIED ERECTILE DYSFUNCTION TYPE: ICD-10-CM

## 2023-09-26 ENCOUNTER — PATIENT MESSAGE (OUTPATIENT)
Dept: HEMATOLOGY/ONCOLOGY | Facility: CLINIC | Age: 63
End: 2023-09-26
Payer: COMMERCIAL

## 2023-09-26 ENCOUNTER — DOCUMENTATION ONLY (OUTPATIENT)
Dept: HEMATOLOGY/ONCOLOGY | Facility: CLINIC | Age: 63
End: 2023-09-26
Payer: COMMERCIAL

## 2023-09-26 RX ORDER — SILDENAFIL 25 MG/1
50 TABLET, FILM COATED ORAL DAILY PRN
Qty: 60 TABLET | Refills: 3 | Status: SHIPPED | OUTPATIENT
Start: 2023-09-26 | End: 2023-09-26 | Stop reason: SDUPTHER

## 2023-09-26 RX ORDER — SILDENAFIL 25 MG/1
50 TABLET, FILM COATED ORAL DAILY PRN
Qty: 60 TABLET | Refills: 3 | Status: SHIPPED | OUTPATIENT
Start: 2023-09-26 | End: 2024-01-02 | Stop reason: SDUPTHER

## 2023-09-26 NOTE — PROGRESS NOTES
Social Work Consult    Name:Jose Fischer  : 1960  MRN: [id number]    Referral:Jaimie GLYNN received consult from Penelope Bowden RN. Patient will need a Psychiatric hospital stay for 10/16-10/17 for a Shared Services appointment.    RENARD called and left a VM with patient requesting a call back to confirm need and ask security eligibility questions. Penelope notified.

## 2023-09-27 ENCOUNTER — PATIENT MESSAGE (OUTPATIENT)
Dept: REHABILITATION | Facility: HOSPITAL | Age: 63
End: 2023-09-27
Payer: COMMERCIAL

## 2023-09-28 ENCOUNTER — DOCUMENTATION ONLY (OUTPATIENT)
Dept: HEMATOLOGY/ONCOLOGY | Facility: CLINIC | Age: 63
End: 2023-09-28
Payer: COMMERCIAL

## 2023-09-28 NOTE — PROGRESS NOTES
SW spoke to patient who confirmed Hope [facility]ota request for 10/16-10/18.    SW completed and emailed Hope Niota request and caregiver exemption form. Patient reports full independence and Dr. Elizabeth's note from 9/14 shows ECOG score of 0 and patient functioning at predisease levels.

## 2023-10-13 ENCOUNTER — PATIENT MESSAGE (OUTPATIENT)
Dept: HEMATOLOGY/ONCOLOGY | Facility: CLINIC | Age: 63
End: 2023-10-13
Payer: COMMERCIAL

## 2023-10-17 ENCOUNTER — OFFICE VISIT (OUTPATIENT)
Dept: HEMATOLOGY/ONCOLOGY | Facility: CLINIC | Age: 63
End: 2023-10-17
Payer: COMMERCIAL

## 2023-10-17 VITALS
SYSTOLIC BLOOD PRESSURE: 97 MMHG | HEIGHT: 72 IN | DIASTOLIC BLOOD PRESSURE: 53 MMHG | HEART RATE: 49 BPM | OXYGEN SATURATION: 96 % | WEIGHT: 143.94 LBS | BODY MASS INDEX: 19.5 KG/M2

## 2023-10-17 DIAGNOSIS — R26.89 DECREASED FUNCTIONAL MOBILITY: ICD-10-CM

## 2023-10-17 DIAGNOSIS — C61 PROSTATE CANCER: Primary | ICD-10-CM

## 2023-10-17 DIAGNOSIS — F43.29 STRESS AND ADJUSTMENT REACTION: ICD-10-CM

## 2023-10-17 PROCEDURE — 3078F PR MOST RECENT DIASTOLIC BLOOD PRESSURE < 80 MM HG: ICD-10-PCS | Mod: CPTII,S$GLB,, | Performed by: OBSTETRICS & GYNECOLOGY

## 2023-10-17 PROCEDURE — 1159F PR MEDICATION LIST DOCUMENTED IN MEDICAL RECORD: ICD-10-PCS | Mod: CPTII,S$GLB,, | Performed by: OBSTETRICS & GYNECOLOGY

## 2023-10-17 PROCEDURE — 1159F MED LIST DOCD IN RCRD: CPT | Mod: CPTII,S$GLB,, | Performed by: OBSTETRICS & GYNECOLOGY

## 2023-10-17 PROCEDURE — 99999 PR PBB SHADOW E&M-EST. PATIENT-LVL III: CPT | Mod: PBBFAC,,, | Performed by: OBSTETRICS & GYNECOLOGY

## 2023-10-17 PROCEDURE — 99999 PR PBB SHADOW E&M-EST. PATIENT-LVL III: ICD-10-PCS | Mod: PBBFAC,,, | Performed by: OBSTETRICS & GYNECOLOGY

## 2023-10-17 PROCEDURE — 3074F PR MOST RECENT SYSTOLIC BLOOD PRESSURE < 130 MM HG: ICD-10-PCS | Mod: CPTII,S$GLB,, | Performed by: OBSTETRICS & GYNECOLOGY

## 2023-10-17 PROCEDURE — 3008F BODY MASS INDEX DOCD: CPT | Mod: CPTII,S$GLB,, | Performed by: OBSTETRICS & GYNECOLOGY

## 2023-10-17 PROCEDURE — 99213 PR OFFICE/OUTPT VISIT, EST, LEVL III, 20-29 MIN: ICD-10-PCS | Mod: S$GLB,,, | Performed by: OBSTETRICS & GYNECOLOGY

## 2023-10-17 PROCEDURE — 3008F PR BODY MASS INDEX (BMI) DOCUMENTED: ICD-10-PCS | Mod: CPTII,S$GLB,, | Performed by: OBSTETRICS & GYNECOLOGY

## 2023-10-17 PROCEDURE — 3074F SYST BP LT 130 MM HG: CPT | Mod: CPTII,S$GLB,, | Performed by: OBSTETRICS & GYNECOLOGY

## 2023-10-17 PROCEDURE — 99213 OFFICE O/P EST LOW 20 MIN: CPT | Mod: S$GLB,,, | Performed by: OBSTETRICS & GYNECOLOGY

## 2023-10-17 PROCEDURE — 3078F DIAST BP <80 MM HG: CPT | Mod: CPTII,S$GLB,, | Performed by: OBSTETRICS & GYNECOLOGY

## 2023-11-16 ENCOUNTER — OFFICE VISIT (OUTPATIENT)
Dept: RADIATION ONCOLOGY | Facility: CLINIC | Age: 63
End: 2023-11-16
Payer: COMMERCIAL

## 2023-11-16 DIAGNOSIS — C61 PROSTATE CANCER: Primary | ICD-10-CM

## 2023-11-16 PROCEDURE — 1159F MED LIST DOCD IN RCRD: CPT | Mod: CPTII,95,, | Performed by: STUDENT IN AN ORGANIZED HEALTH CARE EDUCATION/TRAINING PROGRAM

## 2023-11-16 PROCEDURE — 1159F PR MEDICATION LIST DOCUMENTED IN MEDICAL RECORD: ICD-10-PCS | Mod: CPTII,95,, | Performed by: STUDENT IN AN ORGANIZED HEALTH CARE EDUCATION/TRAINING PROGRAM

## 2023-11-16 PROCEDURE — 1160F PR REVIEW ALL MEDS BY PRESCRIBER/CLIN PHARMACIST DOCUMENTED: ICD-10-PCS | Mod: CPTII,95,, | Performed by: STUDENT IN AN ORGANIZED HEALTH CARE EDUCATION/TRAINING PROGRAM

## 2023-11-16 PROCEDURE — 99441 PR PHYSICIAN TELEPHONE EVALUATION 5-10 MIN: CPT | Mod: 95,,, | Performed by: STUDENT IN AN ORGANIZED HEALTH CARE EDUCATION/TRAINING PROGRAM

## 2023-11-16 PROCEDURE — 99441 PR PHYSICIAN TELEPHONE EVALUATION 5-10 MIN: ICD-10-PCS | Mod: 95,,, | Performed by: STUDENT IN AN ORGANIZED HEALTH CARE EDUCATION/TRAINING PROGRAM

## 2023-11-16 PROCEDURE — 1160F RVW MEDS BY RX/DR IN RCRD: CPT | Mod: CPTII,95,, | Performed by: STUDENT IN AN ORGANIZED HEALTH CARE EDUCATION/TRAINING PROGRAM

## 2023-11-16 NOTE — PROGRESS NOTES
Established Patient - Audio Only Telehealth Visit     The patient location is: home  The chief complaint leading to consultation is: prostate cancer,  Visit type: Virtual visit with audio only (telephone)  Total time spent with patient: 9 min     The reason for the audio only service rather than synchronous audio and video virtual visit was related to technical difficulties or patient preference/necessity.     Each patient to whom I provide medical services by telemedicine is:  (1) informed of the relationship between the physician and patient and the respective role of any other health care provider with respect to management of the patient; and (2) notified that they may decline to receive medical services by telemedicine and may withdraw from such care at any time. Patient verbally consented to receive this service via voice-only telephone call.     This service was not originating from a related E/M service provided within the previous 7 days nor will  to an E/M service or procedure within the next 24 hours or my soonest available appointment.  Prevailing standard of care was able to be met in this audio-only visit.          Radiation Oncology Follow-up Note                                                                                                                                 Date of Service: 11/16/2023     Chief Complaint: prostate cancer, s/p ADT+EBRT     Reason for visit: post EBRT toxicity check     Referring Physician: self referred (last saw Dr Zane Oreilly, [facility], [place])     Implantable devices: denies     Therapy to Date:  Course: C1 Pelvis 2023    Treatment Site Ref. ID Energy Dose/Fx (Gy) #Fx Dose Correction (Gy) Total Dose (Gy) Start Date End Date Elapsed Days   IM Pelvis PTV_High 6X 2.5 28 / 28 0 70 7/13/2023 8/21/2023 39        Diagnosis/Assessment:   Jose Fischer is a 63 y.o. man with unfavorable intermediate risk prostate adenocarcinoma, Stage IIC (cT1c, cN0, cM0, PSA:  17.7, Grade Group: 3) s/p TRUS prostate biopsy (Dr Zane Oreilly, [facility], [place] on 2/14/2023) with 6/12 standard cores involved (left only) GS 9 =5+4 (GG5), of note pathology was re-read by Dr Garza at [facility] and downgraded to GS 7 = 4+3 (GG3); PSMA negative for regional and distant mets  PMH of vasectomy and ED (on Viagra)  MSK nomogram risk EPE, SVI, LNI (%,%,%): 68,22,20  On ADT with Dr Elizabeth  He is s/p 47.6Gy/28fx to the pelvic nodes with SIB of 70Gy to the prostate + SV completed 8/21/2023.     ECOG 0  Great PSA response 17.7 -> 0.03  No RT-related side effects     Plan     - continue ADT per [facility] (Dr Fournier)  - return to [facility] PRN       Interval history       7/28/2023 PSA 0.06    8/21/2023 tolerated radiation therapy well with expected toxicities: dysuria and nocturia x 6, loose stools  c/w flomax q5PM  c/w advil 400mg BID  monitor loose stools  c/w miralax+gasex  c/w accupunture for hot flahes  return in 3 months (audio OK)  Psa per Dr Elizabeth    9/14/2023 PSA 0.03    9/14/2023 last [facility] followup   off ADT due to severe AEs.  Feeling better, still with some hot flashes.     10/17/2023 Dr Aiken (integrative medicine)       Subjective:   Over the phone the patient is alone and overall is feeling very happy and grateful for his care at [facility]      He denies major bowel or sexual function issues.  BMs normal, regular, no gas issues.  He is sexually active and no issues with masturbation. Stopped having hot flashes 2 weeks ago.  He takes flomax qHS, has been off ibuprofen with normal stream. Nocturia x3-4    Has not been getting accupuncture, but continues with Yoga and daily walks in the morning    Initial visit questionnaires  AUA score 25 (2,4,4,4,4,2,5) mixed  AMALIA score 25 (5,5,5,5,5) no ED       Social history     (criminal prosecutor)  From [place]     Family history  Father CRC and pancreatic cancer     Current Outpatient Medications on File Prior to Visit    Medication Sig Dispense Refill    acyclovir (ZOVIRAX) 800 MG Tab Take 800 mg by mouth.      buPROPion (WELLBUTRIN XL) 150 MG TB24 tablet Take 1 tablet (150 mg total) by mouth once daily. 30 tablet 11    sildenafiL (VIAGRA) 25 MG tablet Take 2 tablets (50 mg total) by mouth daily as needed for Erectile Dysfunction. 60 tablet 3    tamsulosin (FLOMAX) 0.4 mg Cap Take 1 capsule (0.4 mg total) by mouth nightly. 90 capsule 3     No current facility-administered medications on file prior to visit.         Review of patient's allergies indicates:  No Known Allergies         Review of Systems   Negative unless as above     HPI:   Jose Fischer is a 62 y.o. man with recent diagnosis of prostate cancer after presenting with elevated PSA.     Oncologic history:     9/30/2016 TRUS prostate biopsy (Dr Samaniego) benign     11/21/2022 PSA 17.72     2/14/2023 TRUS prostate biopsy (Dr Zane Oreilly, [facility], [place])              6/12 standard cores involved (left only) GS 9 =5+4 (GG5)     2/28/2023 NM bone scan (outside)  No evidence of mets     2/28/2023 CT A/P              No mets   3/14/2023 initial [facility] visit: pt believe to have very high risk prostate cancer,  agree with PSMA to get more accurate hosea and metastatic staging     3/14/2023 visit with Dr Elizabeth     3/31/2023 PSMA              Focal prostatic radiotracer avid lesion SUVm 22.8              No evidence of regional or distant radiotracer avid metastatic disease.     4/4/2023 last visit with Dr Gardner         Objective:   Physical Exam  No VS or PE      Laboratory: I have personally reviewed the patient's available laboratory values and summarized pertinent findings above in HPI.      Thank you for the opportunity to care for this patient. Please do not hesitate to contact me with any questions.     Ramiro Lake MD/PhD

## 2023-11-29 ENCOUNTER — PATIENT MESSAGE (OUTPATIENT)
Dept: HEMATOLOGY/ONCOLOGY | Facility: CLINIC | Age: 63
End: 2023-11-29
Payer: COMMERCIAL

## 2023-12-11 ENCOUNTER — PATIENT MESSAGE (OUTPATIENT)
Dept: HEMATOLOGY/ONCOLOGY | Facility: CLINIC | Age: 63
End: 2023-12-11
Payer: COMMERCIAL

## 2024-01-02 DIAGNOSIS — N52.9 ERECTILE DYSFUNCTION, UNSPECIFIED ERECTILE DYSFUNCTION TYPE: ICD-10-CM

## 2024-01-02 DIAGNOSIS — C61 PROSTATE CANCER: ICD-10-CM

## 2024-01-02 RX ORDER — SILDENAFIL 25 MG/1
50 TABLET, FILM COATED ORAL DAILY PRN
Qty: 60 TABLET | Refills: 3 | Status: SHIPPED | OUTPATIENT
Start: 2024-01-02 | End: 2024-01-10 | Stop reason: SDUPTHER

## 2024-01-08 ENCOUNTER — PATIENT MESSAGE (OUTPATIENT)
Dept: HEMATOLOGY/ONCOLOGY | Facility: CLINIC | Age: 64
End: 2024-01-08
Payer: COMMERCIAL

## 2024-01-08 DIAGNOSIS — C61 PROSTATE CANCER: ICD-10-CM

## 2024-01-08 DIAGNOSIS — N52.9 ERECTILE DYSFUNCTION, UNSPECIFIED ERECTILE DYSFUNCTION TYPE: ICD-10-CM

## 2024-01-10 RX ORDER — SILDENAFIL 25 MG/1
50 TABLET, FILM COATED ORAL DAILY PRN
Qty: 60 TABLET | Refills: 3 | Status: SHIPPED | OUTPATIENT
Start: 2024-01-10 | End: 2025-01-09

## 2024-02-19 ENCOUNTER — DOCUMENTATION ONLY (OUTPATIENT)
Dept: HEMATOLOGY/ONCOLOGY | Facility: CLINIC | Age: 64
End: 2024-02-19
Payer: COMMERCIAL

## 2024-02-19 NOTE — PROGRESS NOTES
Oncology Social Worker is consulted today by the Uro Oncology Navigator Eleanor Meza RN, re: a [facility] stay request for 2/21 - 2/23 for patient's Oncology Clinic appointments on 2/22. Called patient at his cell phone number [phone] to discuss and verified the demographic information for the [facility] referral form. Sent the form via email to the [facility]. [facility] [phone] and spoke with Shanti to confirm receipt. She said that they are full with 6-7 others on the wait list already, she will add patient to the wait list and call patient. She will offer patient their hotel partners information and coupon for their discounted rate at the [facility]. Called patient to follow up and provide update. He said that Shanti had already called him and is going to send him the coupon and he'll make arrangements at the [facility]. He stated appreciation to me for assisting him. Will continue to follow and assist as needs are identified.

## 2024-02-22 ENCOUNTER — LAB VISIT (OUTPATIENT)
Dept: LAB | Facility: HOSPITAL | Age: 64
End: 2024-02-22
Attending: INTERNAL MEDICINE
Payer: COMMERCIAL

## 2024-02-22 ENCOUNTER — OFFICE VISIT (OUTPATIENT)
Dept: HEMATOLOGY/ONCOLOGY | Facility: CLINIC | Age: 64
End: 2024-02-22
Payer: COMMERCIAL

## 2024-02-22 ENCOUNTER — PATIENT MESSAGE (OUTPATIENT)
Dept: HEMATOLOGY/ONCOLOGY | Facility: CLINIC | Age: 64
End: 2024-02-22
Payer: COMMERCIAL

## 2024-02-22 VITALS
RESPIRATION RATE: 14 BRPM | SYSTOLIC BLOOD PRESSURE: 98 MMHG | WEIGHT: 144.38 LBS | HEIGHT: 72 IN | HEART RATE: 56 BPM | BODY MASS INDEX: 19.56 KG/M2 | OXYGEN SATURATION: 100 % | DIASTOLIC BLOOD PRESSURE: 63 MMHG | TEMPERATURE: 98 F

## 2024-02-22 DIAGNOSIS — C61 PROSTATE CANCER: Primary | ICD-10-CM

## 2024-02-22 DIAGNOSIS — C61 PROSTATE CANCER: ICD-10-CM

## 2024-02-22 LAB
BASOPHILS # BLD AUTO: 0.01 K/UL (ref 0–0.2)
BASOPHILS NFR BLD: 0.2 % (ref 0–1.9)
COMPLEXED PSA SERPL-MCNC: 0.59 NG/ML (ref 0–4)
DIFFERENTIAL METHOD BLD: ABNORMAL
EOSINOPHIL # BLD AUTO: 0.3 K/UL (ref 0–0.5)
EOSINOPHIL NFR BLD: 6.9 % (ref 0–8)
ERYTHROCYTE [DISTWIDTH] IN BLOOD BY AUTOMATED COUNT: 13.6 % (ref 11.5–14.5)
HCT VFR BLD AUTO: 42.3 % (ref 40–54)
HGB BLD-MCNC: 13.6 G/DL (ref 14–18)
IMM GRANULOCYTES # BLD AUTO: 0.02 K/UL (ref 0–0.04)
IMM GRANULOCYTES NFR BLD AUTO: 0.4 % (ref 0–0.5)
LYMPHOCYTES # BLD AUTO: 0.6 K/UL (ref 1–4.8)
LYMPHOCYTES NFR BLD: 13.1 % (ref 18–48)
MCH RBC QN AUTO: 30 PG (ref 27–31)
MCHC RBC AUTO-ENTMCNC: 32.2 G/DL (ref 32–36)
MCV RBC AUTO: 93 FL (ref 82–98)
MONOCYTES # BLD AUTO: 0.5 K/UL (ref 0.3–1)
MONOCYTES NFR BLD: 9.4 % (ref 4–15)
NEUTROPHILS # BLD AUTO: 3.4 K/UL (ref 1.8–7.7)
NEUTROPHILS NFR BLD: 70 % (ref 38–73)
NRBC BLD-RTO: 0 /100 WBC
PLATELET # BLD AUTO: 196 K/UL (ref 150–450)
PMV BLD AUTO: 9.9 FL (ref 9.2–12.9)
RBC # BLD AUTO: 4.54 M/UL (ref 4.6–6.2)
TESTOST SERPL-MCNC: 830 NG/DL (ref 304–1227)
WBC # BLD AUTO: 4.81 K/UL (ref 3.9–12.7)

## 2024-02-22 PROCEDURE — 3074F SYST BP LT 130 MM HG: CPT | Mod: CPTII,S$GLB,, | Performed by: INTERNAL MEDICINE

## 2024-02-22 PROCEDURE — 85025 COMPLETE CBC W/AUTO DIFF WBC: CPT | Performed by: INTERNAL MEDICINE

## 2024-02-22 PROCEDURE — 84153 ASSAY OF PSA TOTAL: CPT | Performed by: INTERNAL MEDICINE

## 2024-02-22 PROCEDURE — 99999 PR PBB SHADOW E&M-EST. PATIENT-LVL III: CPT | Mod: PBBFAC,,, | Performed by: INTERNAL MEDICINE

## 2024-02-22 PROCEDURE — 99215 OFFICE O/P EST HI 40 MIN: CPT | Mod: S$GLB,,, | Performed by: INTERNAL MEDICINE

## 2024-02-22 PROCEDURE — 3008F BODY MASS INDEX DOCD: CPT | Mod: CPTII,S$GLB,, | Performed by: INTERNAL MEDICINE

## 2024-02-22 PROCEDURE — 84403 ASSAY OF TOTAL TESTOSTERONE: CPT | Performed by: INTERNAL MEDICINE

## 2024-02-22 PROCEDURE — 3078F DIAST BP <80 MM HG: CPT | Mod: CPTII,S$GLB,, | Performed by: INTERNAL MEDICINE

## 2024-02-22 PROCEDURE — 36415 COLL VENOUS BLD VENIPUNCTURE: CPT | Performed by: INTERNAL MEDICINE

## 2024-02-22 NOTE — PROGRESS NOTES
ADVANCED PROSTATE CANCER CLINIC - PATIENT VISIT     Best Contact Phone Number(s): [phone] (home)       Cancer/Stage/TNM:    Cancer Staging   Prostate cancer  Staging form: Prostate, AJCC 8th Edition  - Clinical stage from 2/14/2023: Stage IIC (cT2b, cN0, cM0, PSA: 17.7, Grade Group: 3) - Signed by Patricia Willson NP on 4/28/2023        Reason for visit:   Prostate Cancer     HPI:    Jose Fischer is a 63 y.o. male, to clinic for follow up now s/p XRT for localized prostate cancer.  Now off ADT due to severe AEs.  Feeling better.       History has been obtained by chart review and discussion with the patient.       Oncology History   Prostate cancer   2/14/2023 Cancer Staged    Staging form: Prostate, AJCC 8th Edition  - Clinical stage from 2/14/2023: Stage IIC (cT2b, cN0, cM0, PSA: 17.7, Grade Group: 3)     3/14/2023 Initial Diagnosis    Prostate cancer     4/11/2023 Genetic Testing    [place] [address]-gene panel: Negative.      7/13/2023 - 8/21/2023 Radiation Therapy    Treating physician: nette hope    Course: C1 Pelvis 2023    Treatment Site Ref. ID Energy Dose/Fx (Gy) #Fx Dose Correction (Gy) Total Dose (Gy) Start Date End Date Elapsed Days   IM Pelvis PTV_High 6X 2.5 28 / 28 0 70 7/13/2023 8/21/2023 39            Past Medical History:   Diagnosis Date    Depression     Prostate cancer          Past Surgical History:   Procedure Laterality Date    PROSTATE BIOPSY  02/14/2023         I have reviewed and updated the patient's past medical, surgical, family and social histories.     Review of patient's allergies indicates:  No Known Allergies      Current Outpatient Medications   Medication Sig Dispense Refill    buPROPion (WELLBUTRIN XL) 150 MG TB24 tablet Take 1 tablet (150 mg total) by mouth once daily. 30 tablet 11    sildenafiL (VIAGRA) 25 MG tablet Take 2 tablets (50 mg total) by mouth daily as needed for Erectile Dysfunction. 60 tablet 3    tamsulosin (FLOMAX) 0.4 mg Cap Take 1 capsule (0.4 mg total)  by mouth nightly. 90 capsule 3     No current facility-administered medications for this visit.        Objective:      Physical Exam:   BP 98/63 (BP Location: Left arm, Patient Position: Sitting, BP Method: Medium (Automatic))   Pulse (!) 56   Temp 97.6 °F (36.4 °C) (Oral)   Resp 14   Ht 6' (1.829 m)   Wt 65.5 kg (144 lb 6.4 oz)   SpO2 100%   BMI 19.58 kg/m²       ECOG Performance status: (0) Fully active, able to carry on all predisease performance without restriction     Physical Exam  Constitutional:       Appearance: Normal appearance.      Comments: Thin appearance   HENT:      Head: Normocephalic and atraumatic.   Pulmonary:      Effort: Pulmonary effort is normal.   Skin:     General: Skin is warm and dry.   Neurological:      Mental Status: He is alert.   Psychiatric:         Mood and Affect: Mood normal.         Behavior: Behavior normal.         Thought Content: Thought content normal.         Judgment: Judgment normal.          Recent Labs:   Lab Results   Component Value Date    WBC 4.81 02/22/2024    RBC 4.54 (L) 02/22/2024    HGB 13.6 (L) 02/22/2024    HCT 42.3 02/22/2024    MCV 93 02/22/2024    MCH 30.0 02/22/2024    MCHC 32.2 02/22/2024    RDW 13.6 02/22/2024     02/22/2024    MPV 9.9 02/22/2024    IMMGR 0.4 02/22/2024    GRAN 3.4 02/22/2024    GRAN 70.0 02/22/2024    IGABS 0.02 02/22/2024    LYMPH 0.6 (L) 02/22/2024    LYMPH 13.1 (L) 02/22/2024    MONO 0.5 02/22/2024    MONO 9.4 02/22/2024    EOS 0.3 02/22/2024    BASO 0.01 02/22/2024    NRBC 0 02/22/2024    EOSINOPHIL 6.9 02/22/2024    BASOPHIL 0.2 02/22/2024    DIFFMETHOD Automated 02/22/2024       Lab Results   Component Value Date     09/14/2023    K 4.2 09/14/2023     09/14/2023    CO2 25 09/14/2023     09/14/2023    BUN 18 09/14/2023    CREATININE 0.8 09/14/2023    CALCIUM 9.5 09/14/2023    PROT 6.5 09/14/2023    ALBUMIN 4.0 09/14/2023    BILITOT 0.4 09/14/2023    ALKPHOS 53 (L) 09/14/2023    AST 21 09/14/2023  "   ALT 21 09/14/2023    ANIONGAP 9 09/14/2023    EGFRNORACEVR >60.0 09/14/2023        Lab Results   Component Value Date    PSADIAG 0.59 02/22/2024    PSADIAG 0.03 09/14/2023    PSADIAG 0.06 07/28/2023    PSADIAG 18.8 (H) 04/28/2023    PSADIAG 22.1 (H) 04/04/2023        Cardiovascular Screening:  Primary care physician: Annabelle Garcia MD      The ASCVD Risk score (Boris BASSETT, et al., 2019) failed to calculate for the following reasons:    Cannot find a previous HDL lab    Cannot find a previous total cholesterol lab    ASCVD Risk Level: N/A    EKG: No results found for this or any previous visit.    High blood pressure = No  Antihypertensive agents: This patient does not have an active medication from one of the medication groupers.   Comments:     DM2: No  Antidiabetic agents: This patient does not have an active medication from one of the medication groupers.   Comments:     Hyperlipidemia: No  Lipid lowering agents: This patient does not have an active medication from one of the medication groupers.   Comments:     Antiplatelet therapy: No  Agent: This patient does not have an active medication from one of the medication groupers.   Comment:     Body mass index is 19.58 kg/m².       Bone Health    No results found for: "[id number]"     No results found for this or any previous visit.       Vitamin D: 1000 IU daily  Calcium: Recommend 4 servings of dairy daily     Osteopenia/Osteoporosis: Unknown    Bone strengthening agent: None     Staging Imaging     Results for orders placed during the hospital encounter of 03/31/23    NM PET CT F 18 PYL PSMA, Midthigh to Vertex    Impression  Focal prostatic radiotracer avid lesion correlating with known malignancy.    No evidence of regional or distant radiotracer avid metastatic disease.    I, Sourav Vargas MD, attest that I reviewed and interpreted the images.    Electronically signed by resident: Cortes Sen  Date:    03/31/2023  Time:    15:23    Electronically signed " by: Sourav Vargas  Date:    03/31/2023  Time:    15:42       No results found for this or any previous visit.      No results found for this or any previous visit.       No results found for this or any previous visit.       I have personally reviewed the above imaging.     Path:   Reviewed pathology as documented above.    Genomic testing:     Germline genetic testing  Results for orders placed or performed in visit on 04/11/23   Genetic Misc Sendout Test, Blood   Result Value Ref Range    Miscellaneous Genetic Test Name Moses BRCA + CustomNext-Cancer + RNA     Genso Specimen Type Blood     Genetic counseling? Yes     Parental or Sibling Testing? No     Test Result See result image under hyperlink     Reference Lab SEE COMMENT         Somatic tumor genotyping:      ctDNA genotyping:       Diagnoses:     1. Prostate cancer        Assessment and Plan:      Cancer Staging   Prostate cancer  Staging form: Prostate, AJCC 8th Edition  - Clinical stage from 2/14/2023: Stage IIC (cT2b, cN0, cM0, PSA: 17.7, Grade Group: 3) - Signed by Patricia Willson NP on 4/28/2023    Jose Fischer is a 63 y.o. male, with a recent diagnosis of prostate cancer. Patient decided to pursue XRT and ADT. Now s/p XRT.      Given the degree of interference with his daily life and emotional distress with ADT, we held his last dose.   Here today for follow-up.    PSA is now 0.69 up from 0.03.  Testosterone is now normal (previously castrate).      Will repeat PSA in 6 wks to trend.  If rising, may consider oral ADT (cannot tolerate ADT injections), casodex, observation, and/or tumor board discussion to consider XRT boost, etc.      RTC 6 wks to see me.  Please draw labs the afternoon before (CBC, CMP, PSA, Testosterone).      Patient is in agreement with the proposed treatment plan. All questions were answered to the patient's satisfaction. Pt knows to call clinic if anything is needed before the next clinic visit.    More than 40 mins total time  were spent during this encounter.    Ronald Elizabeth M.D., M.S., F.A.C.P.  Hematology and Oncology Attending  [facility]          Follow up:   Route Chart for Scheduling    Med Onc Chart Routing      Follow up with physician 6 weeks. RTC 6 wks to see me.  Please draw labs the afternoon before (CBC, CMP, PSA, Testosterone).   Follow up with BLAISE    Infusion scheduling note    Injection scheduling note    Labs CBC, CMP and TSH   Scheduling:  Preferred lab:  Lab interval:  Labs to be drawn the afternoon BEFORE patient's clinic visit with me.   Imaging    Pharmacy appointment    Other referrals

## 2024-02-23 ENCOUNTER — PATIENT MESSAGE (OUTPATIENT)
Dept: HEMATOLOGY/ONCOLOGY | Facility: CLINIC | Age: 64
End: 2024-02-23

## 2024-02-23 ENCOUNTER — PATIENT MESSAGE (OUTPATIENT)
Dept: HEMATOLOGY/ONCOLOGY | Facility: CLINIC | Age: 64
End: 2024-02-23
Payer: COMMERCIAL

## 2024-02-23 ENCOUNTER — OFFICE VISIT (OUTPATIENT)
Dept: HEMATOLOGY/ONCOLOGY | Facility: CLINIC | Age: 64
End: 2024-02-23
Payer: COMMERCIAL

## 2024-02-23 VITALS
DIASTOLIC BLOOD PRESSURE: 71 MMHG | BODY MASS INDEX: 19.74 KG/M2 | TEMPERATURE: 98 F | HEART RATE: 57 BPM | RESPIRATION RATE: 16 BRPM | WEIGHT: 145.75 LBS | OXYGEN SATURATION: 98 % | SYSTOLIC BLOOD PRESSURE: 108 MMHG | HEIGHT: 72 IN

## 2024-02-23 DIAGNOSIS — C61 PROSTATE CANCER: Primary | ICD-10-CM

## 2024-02-23 PROCEDURE — 3074F SYST BP LT 130 MM HG: CPT | Mod: CPTII,S$GLB,, | Performed by: INTERNAL MEDICINE

## 2024-02-23 PROCEDURE — 3008F BODY MASS INDEX DOCD: CPT | Mod: CPTII,S$GLB,, | Performed by: INTERNAL MEDICINE

## 2024-02-23 PROCEDURE — 1159F MED LIST DOCD IN RCRD: CPT | Mod: CPTII,S$GLB,, | Performed by: INTERNAL MEDICINE

## 2024-02-23 PROCEDURE — 3078F DIAST BP <80 MM HG: CPT | Mod: CPTII,S$GLB,, | Performed by: INTERNAL MEDICINE

## 2024-02-23 PROCEDURE — 99215 OFFICE O/P EST HI 40 MIN: CPT | Mod: S$GLB,,, | Performed by: INTERNAL MEDICINE

## 2024-02-23 PROCEDURE — 99999 PR PBB SHADOW E&M-EST. PATIENT-LVL III: CPT | Mod: PBBFAC,,, | Performed by: INTERNAL MEDICINE

## 2024-02-23 NOTE — PROGRESS NOTES
Saw patient in clinic again this morning to review PSA results and answer questions.  Wife attended by phone.  PSA was mildly elevated at 0.6.  We extensively discussed the concept of biochemical recurrence and what that might mean.  I want to trend his PSA before we make any decisions, as I don't think we can determine much based on one single, slightly elevated PSA.  Pt had horrible tolerance of ADT in the past.  He was visibly upset by the news that his PSA was slightly up.  We discussed trending the PSA.  We will repeat it in 6 weeks.  If PSA continues to rise, we could consider oral ADT, Casodex, or observation.  May also consider presenting his case at tumor board as well, to see if a XRT boost might be useful IF PSA would trend up.  I will also discuss with Dr. Quan from psychiatry and try to get him back in with him ASAP.       Pt will return in 6 wks with repeat labs.      More than 40 mins total time were spent during this encounter.    Ronald Elizabeth M.D., M.S., F.A.C.P.  Hematology and Oncology Attending  [facility]

## 2024-02-26 ENCOUNTER — TELEPHONE (OUTPATIENT)
Dept: HEMATOLOGY/ONCOLOGY | Facility: CLINIC | Age: 64
End: 2024-02-26
Payer: COMMERCIAL

## 2024-02-26 NOTE — TELEPHONE ENCOUNTER
LVM in regards to scheduling f/u appt with Dr. Luevano. MA instructed pt. in voicemail to call the office number for scheduling.        IRA YI ext [phone]

## 2024-03-01 ENCOUNTER — PATIENT MESSAGE (OUTPATIENT)
Dept: HEMATOLOGY/ONCOLOGY | Facility: CLINIC | Age: 64
End: 2024-03-01
Payer: COMMERCIAL

## 2024-03-01 DIAGNOSIS — F43.29 STRESS AND ADJUSTMENT REACTION: Primary | ICD-10-CM

## 2024-03-01 RX ORDER — BUPROPION HYDROCHLORIDE 300 MG/1
300 TABLET ORAL DAILY
Qty: 30 TABLET | Refills: 6 | Status: SHIPPED | OUTPATIENT
Start: 2024-03-01 | End: 2025-03-01

## 2024-03-05 ENCOUNTER — TELEPHONE (OUTPATIENT)
Dept: PSYCHIATRY | Facility: CLINIC | Age: 64
End: 2024-03-05
Payer: COMMERCIAL

## 2024-03-19 ENCOUNTER — PATIENT MESSAGE (OUTPATIENT)
Dept: PSYCHIATRY | Facility: CLINIC | Age: 64
End: 2024-03-19

## 2024-03-19 ENCOUNTER — PATIENT MESSAGE (OUTPATIENT)
Dept: HEMATOLOGY/ONCOLOGY | Facility: CLINIC | Age: 64
End: 2024-03-19
Payer: COMMERCIAL

## 2024-03-19 ENCOUNTER — OFFICE VISIT (OUTPATIENT)
Dept: PSYCHIATRY | Facility: CLINIC | Age: 64
End: 2024-03-19
Payer: COMMERCIAL

## 2024-03-19 DIAGNOSIS — F43.23 ADJUSTMENT DISORDER WITH MIXED ANXIETY AND DEPRESSED MOOD: Primary | ICD-10-CM

## 2024-03-19 PROCEDURE — 90834 PSYTX W PT 45 MINUTES: CPT | Mod: 95,,, | Performed by: CASE MANAGER/CARE COORDINATOR

## 2024-03-19 NOTE — PROGRESS NOTES
The patient location is:  at home at [address] [place] [address] [place] [address]  (Provider licensed in [place] and [place])  The patient location County is: [place]  The patient phone number is: [phone]   Visit type: Virtual visit with synchronous audio and video  Each patient to whom he or she provides medical services by telemedicine is:  (1) informed of the relationship between the provider and patient and the respective role of any other health care provider with respect to management of the patient; and (2) notified that he or she may decline to receive medical services by telemedicine and may withdraw from such care at any time.    Crisis Disclaimer: Patient was informed that due to the virtual nature of the visit, that if a crisis develops, protocols will be implemented to ensure patient safety, including but not limited to: 1) Initiating a welfare check with local Law Enforcement, 2) Calling 1/National Crisis Hotline, and/or 3) Initiating PEC/CEC procedures.    Time (Face-to-face): 45 minutes synchronous audio and video and a few minutes over the phone after patient disconnected from virtual visit.    Time (Non-face-to-face): 15 minutes     PSYCHO-ONCOLOGY NOTE/ Individual Psychotherapy     Date: 3/19/2024   Site:  [facility]    Name: Jose Fischer     Therapeutic Intervention: Met with patient.  Outpatient - Insight oriented psychotherapy 45 min - CPT code 39192, Outpatient - Behavior modifying psychotherapy 45 min - CPT code 23607, and Outpatient - Supportive psychotherapy 45 min - CPT Code 34829    Jose Fischer is a 63 y.o. White male who was last seen by me on 8/22/2023.  INFORMED CONSENT: Patient was identified using two patient-identifiers. The patient has been informed of the risks and benefits associated with engaging in psychotherapy, the handling of protected health information, the rights of privacy and the limits of confidentiality. The patient has also been informed of the importance of  reporting any suicidal or homicidal ideation to this or any provider to ensure safety of all parties, and the Jose KATIE Fischer expressed understanding. The patient was agreeable to these terms and freely participates in individual psychotherapy.    Problem list  Patient Active Problem List   Diagnosis    Elevated prostate specific antigen (PSA)    BPH with urinary obstruction    Prostate cancer    Stress and adjustment reaction    Decreased functional mobility    Alteration in instrumental activities of daily living (IADL)       Chief complaint/reason for encounter: depression, anxiety, and frustration   Met with patient to evaluate psychosocial adaptation to diagnosis, treatment course, and treatment effects of prostate cancer.    Current Medications  Current Outpatient Medications   Medication    buPROPion (WELLBUTRIN XL) 300 MG 24 hr tablet    sildenafiL (VIAGRA) 25 MG tablet    tamsulosin (FLOMAX) 0.4 mg Cap     No current facility-administered medications for this visit.       ONCOLOGY HISTORY  Oncology History   Prostate cancer   2/14/2023 Cancer Staged    Staging form: Prostate, AJCC 8th Edition  - Clinical stage from 2/14/2023: Stage IIC (cT2b, cN0, cM0, PSA: 17.7, Grade Group: 3)     3/14/2023 Initial Diagnosis    Prostate cancer     4/11/2023 Genetic Testing    [facility] 91-gene panel: Negative.      7/13/2023 - 8/21/2023 Radiation Therapy    Treating physician: nette hope    Course: C1 Pelvis 2023    Treatment Site Ref. ID Energy Dose/Fx (Gy) #Fx Dose Correction (Gy) Total Dose (Gy) Start Date End Date Elapsed Days   IM Pelvis PTV_High 6X 2.5 28 / 28 0 70 7/13/2023 8/21/2023 39          Objective:  Jose Baptistevan arrived promptly for the session. . The patient was cooperative for most of the session and then decided to end session. Session was concluded at this point.  Appearance: age appropriate, appropriately  dressed, well groomed  Behavior/Cooperation: friendly and cooperative  Speech: Pressured  speech and louder at times during session when appearing to have increased frustration  Mood: irritable  Affect: mood congruent; patient did appear to be somewhat calmer after practicing visual imagery exercise for relaxation during session, however frustration appeared to increase again in the session  Thought Process: goal-directed, logical  Thought Content: normal,  No delusions or paranoia; did not appear to be responding to internal stimuli during the session  Orientation: grossly intact  Memory: grossly intact  Attention Span/Concentration: Attends to session without distraction  Fund of Knowledge: average  Estimate of Intelligence: average from verbal skills and history  Cognition: grossly intact  Insight: patient has awareness of illness; good insight into own behavior and behavior of others  Judgment: the patient's behavior is adequate to circumstances      Interval history and content of current session: Patient discussed events and activities since the time of last visit.  Discussed current adaptation to disease and treatment status. Patient shared that he has obtained bad medical test results and may be starting hormone treatment again.  Reports to be coping with significant difficulty. Evaluated cognitive response, paying particular attention to negative intrusive thoughts of a persistent and detrimental nature. Patient noted increased frustration and anxiety. He noted worries about starting hormone treatment again and of the possible adverse reactions. He also noted worry about his health. Thoughts of this type are in evidence with significance distress. Provided cognitive behavioral therapy to address negative cognitions. Reviewed with patient his coping skills. Patient noted he has been doing Splice study still. Patient shared that he has not been walking but discussed with patient engaging in this again and he noted he plans to. Practiced with patient deep breathing and visual imagery exercise for  relaxation during session. Patient noted he imagined himself in [facility] where he visited recently. He noted he found the exercise helpful and appeared somewhat calmer after the exercise. Started discussing restructuring unhelpful thoughts with patient and his frustration appeared to increase again. Patient noted that he feels like his best days of his life are behind him. He did acknowledge that he will have good days in the future. Patient noted he has been in a relationship for 7 or 8 years and that they have a good relationship. He noted concerns about side effects of treatment that will affect different areas of his life. Patient disconnected from virtual session while appearing frustrated. I called him and we talked for a few minutes while I attempted to focus conversation on coping skills. Patient noted that he had to go and concluded session.     Risk parameters:   Patient reports no suicidal ideation  Patient reports no homicidal ideation  Patient reports no self-injurious behavior  Patient reports no violent behavior  Patient noted he marked nearly everyday for question 9 on PHQ-9 due him not happy that he may be starting hormone treatment again and that he does have thoughts that he would be better off dead but he denied SI multiple times during the session.     Safety needs:  None at this time      Verbal deficits: None     Patient's response to intervention:The patient's response to intervention is  guarded .     Progress toward goals and other mental status changes:  The patient's progress toward goals is  limited as patient has seen increased anxiety and depressive symptoms since last session .      Progress to date: Increased anxiety and depressive symptoms since last session as patient noted what he sees as bad medical results recently      Goals from last visit: Attempted, partially met        Patient Strengths: verbal, intelligent, successful, good social support, good insight, and commitment to  wellness    Patient reported outcomes:      NCCN Distress thermometer:       3/18/2024     1:35 PM 2/23/2024     9:49 AM 2/19/2024     8:35 AM 10/17/2023     9:07 AM 9/14/2023     9:49 AM 9/14/2023     3:18 AM 8/22/2023     8:43 AM   DISTRESS SCREENING   Distress Score 8 9 0 - No Distress 2 8 1 0 - No Distress   Practical Concerns Treatment decisions  None of these  None of these None of these None of these   Social Concerns Relationship with children  None of these  None of these None of these None of these   Emotional Concerns Worry or anxiety;Sadness or depression;Fear;Feelings of worthlessness or being a burden  None of these  Nervousness Fears None of these   Retire Spiritual or Anabaptist Concerns     No No No   Spiritual or Anabaptist Concerns None of these  None of these       Physical Concerns Sexual health;Loss or change of physical abilities  None of these  None of these Changes in urination Changes in urination;Diarrhea;Eating;Fatigue;Nausea;Skin Dry/Itchy;Sleep   Other Problems  Concerned about the Cancer  life              PHQ-9= Initial visit: 11    PHQ ANSWERS    Over the last 2 weeks, how often have you been bothered by any of the following problems?  Little interest or pleasure in doing things: Several days  Feeling down, depressed, or hopeless: Nearly every day  Trouble falling or staying asleep, or sleeping too much: Several days  Feeling tired or having little energy: Several days  Poor appetite or overeating: More than half the days  Feeling bad about yourself - or that you are a failure or have let yourself or your family down: Nearly every day  Trouble concentrating on things, such as reading the newspaper or watching television: Several days  Moving or speaking so slowly that other people could have noticed. Or the opposite - being so fidgety or restless that you have been moving around a lot more than usual: Several days  Thoughts that you would be better off dead, or of hurting yourself in  some way: Nearly every day  PHQ-9 Total Score: 16    PHQ8 Score : 13 (03/19/24 0809)  PHQ-9 Total Score: 16 (03/19/24 0809)      PIPE-7= Initial visit: 6       3/19/2024     8:07 AM 8/22/2023     8:42 AM 7/27/2023     9:50 AM   GAD7   1. Feeling nervous, anxious, or on edge? 3 1 3   2. Not being able to stop or control worrying? 3 0 1   3. Worrying too much about different things? 0 0 0   4. Trouble relaxing? 3 0 0   5. Being so restless that it is hard to sit still? 1 0 0   6. Becoming easily annoyed or irritable? 3 1 3   7. Feeling afraid as if something awful might happen? 3 1 3   PIPE-7 Score 16 3 10        Treatment Plan: Patient concluded session before scheduling next appointment. Patient will be offered to schedule follow-up with me.  Target symptoms: depression, anxiety , adjustment  Why chosen therapy is appropriate versus another modality: relevant to diagnosis, patient responds to this modality, evidence based practice  Outcome monitoring methods: self-report, observation, checklist/rating scale  Therapeutic intervention type: insight oriented psychotherapy, behavior modifying psychotherapy, supportive psychotherapy  Prognosis: Fair to Good      Behavioral goals:      Stress management: Practice deep breathing and visual imagery exercise for relaxation; engage in coping skills      Return to clinic: as scheduled     Length of Service (minutes direct face-to-face contact): 45    Diagnosis:     ICD-10-CM ICD-9-CM   1. Adjustment disorder with mixed anxiety and depressed mood  F43.23 309.28           Ritchie Luevano Psy.D.  Clinical Psychologist  [place] License #[id number]  [place] License #[id number]

## 2024-03-20 ENCOUNTER — TUMOR BOARD CONFERENCE (OUTPATIENT)
Dept: RESEARCH | Facility: HOSPITAL | Age: 64
End: 2024-03-20
Payer: COMMERCIAL

## 2024-03-20 NOTE — PROGRESS NOTES
[facility] Tumor Board    Date: 3/20/2024    Patient Name: Jose Fischer    MRN: [id number]    Diagnosis: Stage II Prostate Cancer - Diagnosed in 3/2023. PSMA at this negative for metastatic disease. Patient treated with definitive XRT and ADT for stage IIC prostate cancer. PSA responded from 18-->0.03 but never undetectable. Patient tolerated ADT very poorly. Now PSA up to 0.59.    Referring Provider: Dr. Elizabeth - sent by Patricia Santiago PCTP Providers:     Dr. Sourav Nolasco, Patricia Willson NP, Dyana Gallagher NP, Dr. Ronald Elizabeth    Patient Summary:  Pathology:    Has failed       Current treatment(s):    ECOG: Fully active, able to carry on all pre-disease performance without restriction    Molecular Workup:            Board Recommendations:    Standard of care recommendations:     Trial recommendations: Late phase: DNQ for PRICE since he has not progressed  Early phase: no trials.

## 2024-03-22 ENCOUNTER — TELEPHONE (OUTPATIENT)
Dept: HEMATOLOGY/ONCOLOGY | Facility: CLINIC | Age: 64
End: 2024-03-22
Payer: COMMERCIAL

## 2024-03-22 NOTE — TELEPHONE ENCOUNTER
LVM in regards to scheduling f/u appt with Dr. Luevano. MA instructed pt. in voicemail to call the office number for scheduling.       IRA YI ext [phone]

## 2024-03-26 ENCOUNTER — TELEPHONE (OUTPATIENT)
Dept: HEMATOLOGY/ONCOLOGY | Facility: CLINIC | Age: 64
End: 2024-03-26
Payer: COMMERCIAL

## 2024-03-26 NOTE — TELEPHONE ENCOUNTER
Spoke w/ pt to schedule f/u with Dr. Luevano. Pt approved to schedule virtual appt for Thursday 4/11 @ 8AM.     [place], [place] ext [phone]

## 2024-04-03 ENCOUNTER — LAB VISIT (OUTPATIENT)
Dept: LAB | Facility: HOSPITAL | Age: 64
End: 2024-04-03
Attending: INTERNAL MEDICINE
Payer: COMMERCIAL

## 2024-04-03 DIAGNOSIS — C61 PROSTATE CANCER: ICD-10-CM

## 2024-04-03 LAB
ALBUMIN SERPL BCP-MCNC: 3.8 G/DL (ref 3.5–5.2)
ALP SERPL-CCNC: 56 U/L (ref 55–135)
ALT SERPL W/O P-5'-P-CCNC: 13 U/L (ref 10–44)
ANION GAP SERPL CALC-SCNC: 6 MMOL/L (ref 8–16)
AST SERPL-CCNC: 17 U/L (ref 10–40)
BASOPHILS # BLD AUTO: 0.02 K/UL (ref 0–0.2)
BASOPHILS NFR BLD: 0.5 % (ref 0–1.9)
BILIRUB SERPL-MCNC: 0.3 MG/DL (ref 0.1–1)
BUN SERPL-MCNC: 14 MG/DL (ref 8–23)
CALCIUM SERPL-MCNC: 9.4 MG/DL (ref 8.7–10.5)
CHLORIDE SERPL-SCNC: 104 MMOL/L (ref 95–110)
CO2 SERPL-SCNC: 30 MMOL/L (ref 23–29)
COMPLEXED PSA SERPL-MCNC: 0.47 NG/ML (ref 0–4)
CREAT SERPL-MCNC: 1.1 MG/DL (ref 0.5–1.4)
DIFFERENTIAL METHOD BLD: ABNORMAL
EOSINOPHIL # BLD AUTO: 0.2 K/UL (ref 0–0.5)
EOSINOPHIL NFR BLD: 4.7 % (ref 0–8)
ERYTHROCYTE [DISTWIDTH] IN BLOOD BY AUTOMATED COUNT: 12.5 % (ref 11.5–14.5)
EST. GFR  (NO RACE VARIABLE): >60 ML/MIN/1.73 M^2
GLUCOSE SERPL-MCNC: 89 MG/DL (ref 70–110)
HCT VFR BLD AUTO: 39.1 % (ref 40–54)
HGB BLD-MCNC: 13.2 G/DL (ref 14–18)
IMM GRANULOCYTES # BLD AUTO: 0.01 K/UL (ref 0–0.04)
IMM GRANULOCYTES NFR BLD AUTO: 0.2 % (ref 0–0.5)
LYMPHOCYTES # BLD AUTO: 0.7 K/UL (ref 1–4.8)
LYMPHOCYTES NFR BLD: 14.9 % (ref 18–48)
MCH RBC QN AUTO: 30.9 PG (ref 27–31)
MCHC RBC AUTO-ENTMCNC: 33.8 G/DL (ref 32–36)
MCV RBC AUTO: 92 FL (ref 82–98)
MONOCYTES # BLD AUTO: 0.4 K/UL (ref 0.3–1)
MONOCYTES NFR BLD: 8.8 % (ref 4–15)
NEUTROPHILS # BLD AUTO: 3.2 K/UL (ref 1.8–7.7)
NEUTROPHILS NFR BLD: 70.9 % (ref 38–73)
NRBC BLD-RTO: 0 /100 WBC
PLATELET # BLD AUTO: 231 K/UL (ref 150–450)
PMV BLD AUTO: 9.6 FL (ref 9.2–12.9)
POTASSIUM SERPL-SCNC: 4.2 MMOL/L (ref 3.5–5.1)
PROT SERPL-MCNC: 6.3 G/DL (ref 6–8.4)
RBC # BLD AUTO: 4.27 M/UL (ref 4.6–6.2)
SODIUM SERPL-SCNC: 140 MMOL/L (ref 136–145)
TESTOST SERPL-MCNC: 603 NG/DL (ref 304–1227)
WBC # BLD AUTO: 4.44 K/UL (ref 3.9–12.7)

## 2024-04-03 PROCEDURE — 84153 ASSAY OF PSA TOTAL: CPT | Performed by: INTERNAL MEDICINE

## 2024-04-03 PROCEDURE — 85025 COMPLETE CBC W/AUTO DIFF WBC: CPT | Performed by: INTERNAL MEDICINE

## 2024-04-03 PROCEDURE — 80053 COMPREHEN METABOLIC PANEL: CPT | Performed by: INTERNAL MEDICINE

## 2024-04-03 PROCEDURE — 36415 COLL VENOUS BLD VENIPUNCTURE: CPT | Performed by: INTERNAL MEDICINE

## 2024-04-03 PROCEDURE — 84403 ASSAY OF TOTAL TESTOSTERONE: CPT | Performed by: INTERNAL MEDICINE

## 2024-04-04 ENCOUNTER — OFFICE VISIT (OUTPATIENT)
Dept: HEMATOLOGY/ONCOLOGY | Facility: CLINIC | Age: 64
End: 2024-04-04
Payer: COMMERCIAL

## 2024-04-04 VITALS
HEIGHT: 72 IN | TEMPERATURE: 98 F | WEIGHT: 143.94 LBS | SYSTOLIC BLOOD PRESSURE: 96 MMHG | HEART RATE: 63 BPM | RESPIRATION RATE: 14 BRPM | OXYGEN SATURATION: 98 % | DIASTOLIC BLOOD PRESSURE: 61 MMHG | BODY MASS INDEX: 19.5 KG/M2

## 2024-04-04 DIAGNOSIS — C61 PROSTATE CANCER: Primary | ICD-10-CM

## 2024-04-04 PROCEDURE — G2211 COMPLEX E/M VISIT ADD ON: HCPCS | Mod: S$GLB,,, | Performed by: INTERNAL MEDICINE

## 2024-04-04 PROCEDURE — 99214 OFFICE O/P EST MOD 30 MIN: CPT | Mod: S$GLB,,, | Performed by: INTERNAL MEDICINE

## 2024-04-04 PROCEDURE — 1159F MED LIST DOCD IN RCRD: CPT | Mod: CPTII,S$GLB,, | Performed by: INTERNAL MEDICINE

## 2024-04-04 PROCEDURE — 99999 PR PBB SHADOW E&M-EST. PATIENT-LVL III: CPT | Mod: PBBFAC,,, | Performed by: INTERNAL MEDICINE

## 2024-04-04 PROCEDURE — 3074F SYST BP LT 130 MM HG: CPT | Mod: CPTII,S$GLB,, | Performed by: INTERNAL MEDICINE

## 2024-04-04 PROCEDURE — 3078F DIAST BP <80 MM HG: CPT | Mod: CPTII,S$GLB,, | Performed by: INTERNAL MEDICINE

## 2024-04-04 PROCEDURE — 3008F BODY MASS INDEX DOCD: CPT | Mod: CPTII,S$GLB,, | Performed by: INTERNAL MEDICINE

## 2024-04-04 NOTE — PROGRESS NOTES
[facility] - PATIENT VISIT     Best Contact Phone Number(s): [phone] (home)       Cancer/Stage/TNM:    Cancer Staging   Prostate cancer  Staging form: Prostate, AJCC 8th Edition  - Clinical stage from 2/14/2023: Stage IIC (cT2b, cN0, cM0, PSA: 17.7, Grade Group: 3) - Signed by Patricia Willson NP on 4/28/2023        Reason for visit:   Prostate Cancer     HPI:    Jose Fischer is a 63 y.o. male, to clinic for follow up now s/p XRT for localized prostate cancer.  Now off ADT due to severe AEs.  Feeling better.       History has been obtained by chart review and discussion with the patient.       Oncology History   Prostate cancer   2/14/2023 Cancer Staged    Staging form: Prostate, AJCC 8th Edition  - Clinical stage from 2/14/2023: Stage IIC (cT2b, cN0, cM0, PSA: 17.7, Grade Group: 3)     3/14/2023 Initial Diagnosis    Prostate cancer     4/11/2023 Genetic Testing    [place] [address]-gene panel: Negative.      7/13/2023 - 8/21/2023 Radiation Therapy    Treating physician: nette hope    Course: C1 Pelvis 2023    Treatment Site Ref. ID Energy Dose/Fx (Gy) #Fx Dose Correction (Gy) Total Dose (Gy) Start Date End Date Elapsed Days   IM Pelvis PTV_High 6X 2.5 28 / 28 0 70 7/13/2023 8/21/2023 39            Past Medical History:   Diagnosis Date    Depression     Prostate cancer          Past Surgical History:   Procedure Laterality Date    PROSTATE BIOPSY  02/14/2023         I have reviewed and updated the patient's past medical, surgical, family and social histories.     Review of patient's allergies indicates:  No Known Allergies      Current Outpatient Medications   Medication Sig Dispense Refill    buPROPion (WELLBUTRIN XL) 300 MG 24 hr tablet Take 1 tablet (300 mg total) by mouth once daily. 30 tablet 6    sildenafiL (VIAGRA) 25 MG tablet Take 2 tablets (50 mg total) by mouth daily as needed for Erectile Dysfunction. 60 tablet 3    tamsulosin (FLOMAX) 0.4 mg Cap Take 1 capsule (0.4 mg total)  by mouth nightly. 90 capsule 3     No current facility-administered medications for this visit.        Objective:      Physical Exam:   BP 96/61 (BP Location: Left arm, Patient Position: Sitting, BP Method: Medium (Automatic))   Pulse 63   Temp 97.5 °F (36.4 °C) (Oral)   Resp 14   Ht 6' (1.829 m)   Wt 65.3 kg (143 lb 15.4 oz)   SpO2 98%   BMI 19.52 kg/m²       ECOG Performance status: (0) Fully active, able to carry on all predisease performance without restriction     Physical Exam  Constitutional:       Appearance: Normal appearance.      Comments: Thin appearance   HENT:      Head: Normocephalic and atraumatic.   Pulmonary:      Effort: Pulmonary effort is normal.   Skin:     General: Skin is warm and dry.   Neurological:      Mental Status: He is alert.   Psychiatric:         Mood and Affect: Mood normal.         Behavior: Behavior normal.         Thought Content: Thought content normal.         Judgment: Judgment normal.          Recent Labs:   Lab Results   Component Value Date    WBC 4.44 04/03/2024    RBC 4.27 (L) 04/03/2024    HGB 13.2 (L) 04/03/2024    HCT 39.1 (L) 04/03/2024    MCV 92 04/03/2024    MCH 30.9 04/03/2024    MCHC 33.8 04/03/2024    RDW 12.5 04/03/2024     04/03/2024    MPV 9.6 04/03/2024    IMMGR 0.2 04/03/2024    GRAN 3.2 04/03/2024    GRAN 70.9 04/03/2024    IGABS 0.01 04/03/2024    LYMPH 0.7 (L) 04/03/2024    LYMPH 14.9 (L) 04/03/2024    MONO 0.4 04/03/2024    MONO 8.8 04/03/2024    EOS 0.2 04/03/2024    BASO 0.02 04/03/2024    NRBC 0 04/03/2024    EOSINOPHIL 4.7 04/03/2024    BASOPHIL 0.5 04/03/2024    DIFFMETHOD Automated 04/03/2024       Lab Results   Component Value Date     04/03/2024    K 4.2 04/03/2024     04/03/2024    CO2 30 (H) 04/03/2024    GLU 89 04/03/2024    BUN 14 04/03/2024    CREATININE 1.1 04/03/2024    CALCIUM 9.4 04/03/2024    PROT 6.3 04/03/2024    ALBUMIN 3.8 04/03/2024    BILITOT 0.3 04/03/2024    ALKPHOS 56 04/03/2024    AST 17 04/03/2024  "   ALT 13 04/03/2024    ANIONGAP 6 (L) 04/03/2024    EGFRNORACEVR >60.0 04/03/2024        Lab Results   Component Value Date    PSADIAG 0.47 04/03/2024    PSADIAG 0.59 02/22/2024    PSADIAG 0.03 09/14/2023    PSADIAG 0.06 07/28/2023    PSADIAG 18.8 (H) 04/28/2023    PSADIAG 22.1 (H) 04/04/2023        Cardiovascular Screening:  Primary care physician: Annabelle Garcia MD      The ASCVD Risk score (Boris DK, et al., 2019) failed to calculate for the following reasons:    Cannot find a previous HDL lab    Cannot find a previous total cholesterol lab    ASCVD Risk Level: N/A    EKG: No results found for this or any previous visit.    High blood pressure = No  Antihypertensive agents: This patient does not have an active medication from one of the medication groupers.   Comments:     DM2: No  Antidiabetic agents: This patient does not have an active medication from one of the medication groupers.   Comments:     Hyperlipidemia: No  Lipid lowering agents: This patient does not have an active medication from one of the medication groupers.   Comments:     Antiplatelet therapy: No  Agent: This patient does not have an active medication from one of the medication groupers.   Comment:     There is no height or weight on file to calculate BMI.       Bone Health    No results found for: "[id number]"     No results found for this or any previous visit.       Vitamin D: 1000 IU daily  Calcium: Recommend 4 servings of dairy daily     Osteopenia/Osteoporosis: Unknown    Bone strengthening agent: None     Staging Imaging     Results for orders placed during the hospital encounter of 03/31/23    NM PET CT F 18 PYL PSMA, Midthigh to Vertex    Impression  Focal prostatic radiotracer avid lesion correlating with known malignancy.    No evidence of regional or distant radiotracer avid metastatic disease.    I, Sourav Vargas MD, attest that I reviewed and interpreted the images.    Electronically signed by resident: Cortes " Mckinley  Date:    03/31/2023  Time:    15:23    Electronically signed by: Sourav Vargas  Date:    03/31/2023  Time:    15:42       No results found for this or any previous visit.      No results found for this or any previous visit.       No results found for this or any previous visit.       I have personally reviewed the above imaging.     Path:   Reviewed pathology as documented above.    Genomic testing:     Germline genetic testing  Results for orders placed or performed in visit on 04/11/23   Genetic Misc Sendout Test, Blood   Result Value Ref Range    Miscellaneous Genetic Test Name Moses BRCA + CustomNext-Cancer + RNA     Genso Specimen Type Blood     Genetic counseling? Yes     Parental or Sibling Testing? No     Test Result See result image under hyperlink     Reference Lab SEE COMMENT         Somatic tumor genotyping:      ctDNA genotyping:       Diagnoses:     1. Prostate cancer          Assessment and Plan:      Cancer Staging   Prostate cancer  Staging form: Prostate, AJCC 8th Edition  - Clinical stage from 2/14/2023: Stage IIC (cT2b, cN0, cM0, PSA: 17.7, Grade Group: 3) - Signed by Patricia Willson NP on 4/28/2023    Jose Fischer is a 63 y.o. male, with a recent diagnosis of prostate cancer. Patient decided to pursue XRT and ADT. Now s/p XRT.      Given the degree of interference with his daily life and emotional distress with ADT, we held his last dose.   Here today for follow-up.    PSA previously trended up to 0.69 from 0.03.  Testosterone is now normal (previously castrate).  Today it is stable to slightly improved at 0.47.    Will repeat PSA in 3 mos to continue to trend.  If rising, may consider oral ADT (cannot tolerate ADT injections), casodex, observation, and/or tumor board discussion to consider XRT boost, etc.      RTC 3 mos to see me.  Please draw labs the afternoon before (CBC, CMP, PSA, Testosterone).      Patient is in agreement with the proposed treatment plan. All questions were  answered to the patient's satisfaction. Pt knows to call clinic if anything is needed before the next clinic visit.    More than 30 mins total time were spent during this encounter.    Ronald Elizabeth M.D., M.S., F.A.C.P.  Hematology and Oncology Attending  [facility]          Follow up:   Route Chart for Scheduling    Med Onc Chart Routing      Follow up with physician 3 months. RTC 3 mos to see me.  Please draw labs the afternoon before (CBC, CMP, PSA, Testosterone).   Follow up with BLAISE    Infusion scheduling note    Injection scheduling note    Labs CBC, CMP and PSA   Scheduling:  Preferred lab:  Lab interval:  testosterone   Imaging    Pharmacy appointment    Other referrals

## 2024-04-11 ENCOUNTER — OFFICE VISIT (OUTPATIENT)
Dept: PSYCHIATRY | Facility: CLINIC | Age: 64
End: 2024-04-11
Payer: COMMERCIAL

## 2024-04-11 DIAGNOSIS — F43.23 ADJUSTMENT DISORDER WITH MIXED ANXIETY AND DEPRESSED MOOD: Primary | ICD-10-CM

## 2024-04-11 PROCEDURE — 90832 PSYTX W PT 30 MINUTES: CPT | Mod: 95,,, | Performed by: CASE MANAGER/CARE COORDINATOR

## 2024-04-11 NOTE — PROGRESS NOTES
The patient location is:  at home at [address] [place] [address] [place] [id number]  (Provider licensed in [place] and [place])  The patient location County is: [place]  The patient phone number is: [phone]   Visit type: Virtual visit with synchronous audio and video  Each patient to whom he or she provides medical services by telemedicine is:  (1) informed of the relationship between the provider and patient and the respective role of any other health care provider with respect to management of the patient; and (2) notified that he or she may decline to receive medical services by telemedicine and may withdraw from such care at any time.    Crisis Disclaimer: Patient was informed that due to the virtual nature of the visit, that if a crisis develops, protocols will be implemented to ensure patient safety, including but not limited to: 1) Initiating a welfare check with local Law Enforcement, 2) Calling [facility]/National Crisis Hotline, and/or 3) Initiating PEC/CEC procedures.    Time (Face-to-face): 20 minutes    Time (Non-face-to-face): 10 minutes     PSYCHO-ONCOLOGY NOTE/ Individual Psychotherapy     Date: 4/11/2024   Site:  [facility]    Name: Jose Fischer     Therapeutic Intervention: Met with patient.  Outpatient - Insight oriented psychotherapy 30 min - CPT code 23157, Outpatient - Behavior modifying psychotherapy 30 min - CPT code 23850, and Outpatient - Supportive psychotherapy 30 min - CPT Code 12098    Jose Fischer is a 63 y.o. White male who was last seen by me on 3/19/2024.  INFORMED CONSENT: Patient was identified using two patient-identifiers. The patient has been informed of the risks and benefits associated with engaging in psychotherapy, the handling of protected health information, the rights of privacy and the limits of confidentiality. The patient has also been informed of the importance of reporting any suicidal or homicidal ideation to this or any provider to ensure safety of all parties, and the Jose  KATIE Fischer expressed understanding. The patient was agreeable to these terms and freely participates in individual psychotherapy.    Problem list  Patient Active Problem List   Diagnosis    Elevated prostate specific antigen (PSA)    BPH with urinary obstruction    Prostate cancer    Stress and adjustment reaction    Decreased functional mobility    Alteration in instrumental activities of daily living (IADL)       Chief complaint/reason for encounter: depression and anxiety   Met with patient to evaluate psychosocial adaptation to diagnosis/treatment/survivorship of prostate cancer.    Current Medications  Current Outpatient Medications   Medication    buPROPion (WELLBUTRIN XL) 300 MG 24 hr tablet    sildenafiL (VIAGRA) 25 MG tablet    tamsulosin (FLOMAX) 0.4 mg Cap     No current facility-administered medications for this visit.       ONCOLOGY HISTORY  Oncology History   Prostate cancer   2/14/2023 Cancer Staged    Staging form: Prostate, AJCC 8th Edition  - Clinical stage from 2/14/2023: Stage IIC (cT2b, cN0, cM0, PSA: 17.7, Grade Group: 3)     3/14/2023 Initial Diagnosis    Prostate cancer     4/11/2023 Genetic Testing    [facility]-gene panel: Negative.      7/13/2023 - 8/21/2023 Radiation Therapy    Treating physician: nette hope    Course: C1 Pelvis 2023    Treatment Site Ref. ID Energy Dose/Fx (Gy) #Fx Dose Correction (Gy) Total Dose (Gy) Start Date End Date Elapsed Days   IM Pelvis PTV_High 6X 2.5 28 / 28 0 70 7/13/2023 8/21/2023 39          Objective:  Jose Fischer arrived promptly for the session.  The patient was fully cooperative throughout the session.  Appearance: age appropriate, appropriately  dressed, well groomed  Behavior/Cooperation: friendly and cooperative  Speech: normal in rate, volume, and tone and appropriate quality, quantity and organization of sentences  Mood: euthymic  Affect: mood congruent and appropriate  Thought Process: goal-directed, logical  Thought Content: normal,  No  delusions or paranoia; did not appear to be responding to internal stimuli during the session  Orientation: grossly intact  Memory: grossly intact  Attention Span/Concentration: Attends to session without distraction; reports no difficulty  Fund of Knowledge: average  Estimate of Intelligence: average from verbal skills and history  Cognition: grossly intact  Insight: patient has awareness of his history of illness; good insight into own behavior and behavior of others  Judgment: the patient's behavior is adequate to circumstances      Interval history and content of current session: Patient discussed events and activities since the time of last visit.  Patient shared that he has been practicing deep breathing exercise multiple times per day and finds it helpful.  Discussed current adaptation to disease and treatment status. Reports to be coping in an adequate manner. Patient shared that his most recent PSA level here at [facility] was lower than the one previously and he found this relieving. He also noted he went to [facility] for assessment there and felt like he got reassurance from them. Evaluated cognitive response, paying particular attention to negative intrusive thoughts of a persistent and detrimental nature. Patient noted he has been able to restructure unhelpful thoughts on his own. He stated that he feels like he would be better able to cope with future stressors. Thoughts of this type are in evidence with no distress at this time. Provided cognitive behavioral therapy to address negative cognitions.   Patient noted he is balancing work well and that he plans on engaging in yoga. Discussed coping skills and patient noted he plans on continuing to engage in them on his own. Patient appears better able to cope at this time and patient agreed to not schedule a follow-up with me at this time. Patient agreed to reach out to schedule a follow-up as needed if his needs change in the future.     Risk parameters:    Patient reports no suicidal ideation  Patient reports no homicidal ideation  Patient reports no self-injurious behavior  Patient reports no violent behavior     Safety needs:  None at this time      Verbal deficits: None     Patient's response to intervention:The patient's response to intervention is accepting, motivated.     Progress toward goals and other mental status changes:  The patient's progress toward goals is excellent.      Progress to date:Problem Resolved      Goals from last visit: Met        Patient Strengths: verbal, intelligent, successful, good social support, good insight, and commitment to wellness    Patient reported outcomes:      NCCN Distress thermometer:       4/4/2024    12:42 PM 3/18/2024     1:35 PM 2/23/2024     9:49 AM 2/19/2024     8:35 AM 10/17/2023     9:07 AM 9/14/2023     9:49 AM 9/14/2023     3:18 AM   DISTRESS SCREENING   Distress Score 0 - No Distress 8 9 0 - No Distress 2 8 1   Practical Concerns None of these Treatment decisions  None of these  None of these None of these   Social Concerns None of these Relationship with children  None of these  None of these None of these   Emotional Concerns None of these Worry or anxiety;Sadness or depression;Fear;Feelings of worthlessness or being a burden  None of these  Nervousness Fears   Retire Spiritual or Amish Concerns      No No   Spiritual or Amish Concerns None of these None of these  None of these      Physical Concerns None of these Sexual health;Loss or change of physical abilities  None of these  None of these Changes in urination   Other Problems   Concerned about the Cancer  life             PHQ-9= Initial visit: 11    PHQ ANSWERS    Over the last 2 weeks, how often have you been bothered by any of the following problems?  Little interest or pleasure in doing things: Not at all  Feeling down, depressed, or hopeless: Several days  Trouble falling or staying asleep, or sleeping too much: Not at all  Feeling tired or  having little energy: Not at all  Poor appetite or overeating: Several days  Feeling bad about yourself - or that you are a failure or have let yourself or your family down: Several days  Trouble concentrating on things, such as reading the newspaper or watching television: Not at all  Moving or speaking so slowly that other people could have noticed. Or the opposite - being so fidgety or restless that you have been moving around a lot more than usual: Not at all  Thoughts that you would be better off dead, or of hurting yourself in some way: Not at all  PHQ-9 Total Score: 3    PHQ8 Score : 3 (04/11/24 0810)  PHQ-9 Total Score: 3 (04/11/24 0810)      PIPE-7= Initial visit: 6       4/11/2024     8:09 AM 3/19/2024     8:07 AM 8/22/2023     8:42 AM   GAD7   1. Feeling nervous, anxious, or on edge? 1 3 1   2. Not being able to stop or control worrying? 0 3 0   3. Worrying too much about different things? 0 0 0   4. Trouble relaxing? 0 3 0   5. Being so restless that it is hard to sit still? 0 1 0   6. Becoming easily annoyed or irritable? 1 3 1   7. Feeling afraid as if something awful might happen? 1 3 1   PIPE-7 Score 3 16 3        Treatment Plan: Patient will continue engaging with his oncology team  Target symptoms: depression, anxiety , adjustment  Why chosen therapy is appropriate versus another modality: relevant to diagnosis, patient responds to this modality, evidence based practice  Outcome monitoring methods: self-report, observation, checklist/rating scale  Therapeutic intervention types used: insight oriented psychotherapy, behavior modifying psychotherapy, supportive psychotherapy  Prognosis: Good      Behavioral goals:      Stress management: Continue practicing coping skills    Return to clinic: as needed     Length of Service (minutes direct face-to-face contact):  20    Diagnosis:     ICD-10-CM ICD-9-CM   1. Adjustment disorder with mixed anxiety and depressed mood  F43.23 309.28           Ritchie  Milana Luevano.  Clinical Psychologist  [place] License #[id number]  [place] License #[id number]

## 2024-06-12 DIAGNOSIS — N52.9 ERECTILE DYSFUNCTION, UNSPECIFIED ERECTILE DYSFUNCTION TYPE: ICD-10-CM

## 2024-06-12 DIAGNOSIS — C61 PROSTATE CANCER: ICD-10-CM

## 2024-06-12 RX ORDER — SILDENAFIL 25 MG/1
50 TABLET, FILM COATED ORAL DAILY PRN
Qty: 60 TABLET | Refills: 3 | Status: SHIPPED | OUTPATIENT
Start: 2024-06-12 | End: 2025-06-12

## 2024-07-02 ENCOUNTER — PATIENT MESSAGE (OUTPATIENT)
Dept: HEMATOLOGY/ONCOLOGY | Facility: CLINIC | Age: 64
End: 2024-07-02
Payer: COMMERCIAL

## 2024-07-08 ENCOUNTER — PATIENT MESSAGE (OUTPATIENT)
Dept: HEMATOLOGY/ONCOLOGY | Facility: CLINIC | Age: 64
End: 2024-07-08
Payer: COMMERCIAL

## 2024-07-08 DIAGNOSIS — C61 PROSTATE CANCER: ICD-10-CM

## 2024-07-09 ENCOUNTER — LAB VISIT (OUTPATIENT)
Dept: LAB | Facility: HOSPITAL | Age: 64
End: 2024-07-09
Attending: INTERNAL MEDICINE
Payer: COMMERCIAL

## 2024-07-09 DIAGNOSIS — C61 PROSTATE CANCER: ICD-10-CM

## 2024-07-09 LAB
ALBUMIN SERPL BCP-MCNC: 4 G/DL (ref 3.5–5.2)
ALP SERPL-CCNC: 54 U/L (ref 55–135)
ALT SERPL W/O P-5'-P-CCNC: 16 U/L (ref 10–44)
ANION GAP SERPL CALC-SCNC: 9 MMOL/L (ref 8–16)
AST SERPL-CCNC: 19 U/L (ref 10–40)
BASOPHILS # BLD AUTO: 0.02 K/UL (ref 0–0.2)
BASOPHILS NFR BLD: 0.5 % (ref 0–1.9)
BILIRUB SERPL-MCNC: 0.3 MG/DL (ref 0.1–1)
BUN SERPL-MCNC: 13 MG/DL (ref 8–23)
CALCIUM SERPL-MCNC: 9.3 MG/DL (ref 8.7–10.5)
CHLORIDE SERPL-SCNC: 105 MMOL/L (ref 95–110)
CO2 SERPL-SCNC: 24 MMOL/L (ref 23–29)
CREAT SERPL-MCNC: 0.8 MG/DL (ref 0.5–1.4)
DIFFERENTIAL METHOD BLD: ABNORMAL
EOSINOPHIL # BLD AUTO: 0.1 K/UL (ref 0–0.5)
EOSINOPHIL NFR BLD: 3.4 % (ref 0–8)
ERYTHROCYTE [DISTWIDTH] IN BLOOD BY AUTOMATED COUNT: 12.5 % (ref 11.5–14.5)
EST. GFR  (NO RACE VARIABLE): >60 ML/MIN/1.73 M^2
GLUCOSE SERPL-MCNC: 106 MG/DL (ref 70–110)
HCT VFR BLD AUTO: 39 % (ref 40–54)
HGB BLD-MCNC: 13 G/DL (ref 14–18)
IMM GRANULOCYTES # BLD AUTO: 0.01 K/UL (ref 0–0.04)
IMM GRANULOCYTES NFR BLD AUTO: 0.3 % (ref 0–0.5)
LYMPHOCYTES # BLD AUTO: 0.6 K/UL (ref 1–4.8)
LYMPHOCYTES NFR BLD: 14.7 % (ref 18–48)
MCH RBC QN AUTO: 30.2 PG (ref 27–31)
MCHC RBC AUTO-ENTMCNC: 33.3 G/DL (ref 32–36)
MCV RBC AUTO: 91 FL (ref 82–98)
MONOCYTES # BLD AUTO: 0.3 K/UL (ref 0.3–1)
MONOCYTES NFR BLD: 8.4 % (ref 4–15)
NEUTROPHILS # BLD AUTO: 2.8 K/UL (ref 1.8–7.7)
NEUTROPHILS NFR BLD: 72.7 % (ref 38–73)
NRBC BLD-RTO: 0 /100 WBC
PLATELET # BLD AUTO: 180 K/UL (ref 150–450)
PMV BLD AUTO: 9.8 FL (ref 9.2–12.9)
POTASSIUM SERPL-SCNC: 4.1 MMOL/L (ref 3.5–5.1)
PROT SERPL-MCNC: 6.5 G/DL (ref 6–8.4)
RBC # BLD AUTO: 4.31 M/UL (ref 4.6–6.2)
SODIUM SERPL-SCNC: 138 MMOL/L (ref 136–145)
WBC # BLD AUTO: 3.81 K/UL (ref 3.9–12.7)

## 2024-07-09 PROCEDURE — 84403 ASSAY OF TOTAL TESTOSTERONE: CPT | Performed by: INTERNAL MEDICINE

## 2024-07-09 PROCEDURE — 80053 COMPREHEN METABOLIC PANEL: CPT | Performed by: INTERNAL MEDICINE

## 2024-07-09 PROCEDURE — 84153 ASSAY OF PSA TOTAL: CPT | Performed by: INTERNAL MEDICINE

## 2024-07-09 PROCEDURE — 85025 COMPLETE CBC W/AUTO DIFF WBC: CPT | Performed by: INTERNAL MEDICINE

## 2024-07-09 PROCEDURE — 36415 COLL VENOUS BLD VENIPUNCTURE: CPT | Performed by: INTERNAL MEDICINE

## 2024-07-09 NOTE — PROGRESS NOTES
[facility] - PATIENT VISIT     Best Contact Phone Number(s): [phone] (home)       Cancer/Stage/TNM:    Cancer Staging   Prostate cancer  Staging form: Prostate, AJCC 8th Edition  - Clinical stage from 2/14/2023: Stage IIC (cT2b, cN0, cM0, PSA: 17.7, Grade Group: 3) - Signed by Patricia Willson NP on 4/28/2023        Reason for visit:   Prostate Cancer     HPI:    Jose Fischer is a 64 y.o. male, to clinic for follow up now s/p XRT for localized prostate cancer.  Now off ADT due to severe AEs.  Feeling better.       History has been obtained by chart review and discussion with the patient.       Oncology History   Prostate cancer   2/14/2023 Cancer Staged    Staging form: Prostate, AJCC 8th Edition  - Clinical stage from 2/14/2023: Stage IIC (cT2b, cN0, cM0, PSA: 17.7, Grade Group: 3)     3/14/2023 Initial Diagnosis    Prostate cancer     4/11/2023 Genetic Testing    [place] [address]-gene panel: Negative.      7/13/2023 - 8/21/2023 Radiation Therapy    Treating physician: nette hope    Course: C1 Pelvis 2023    Treatment Site Ref. ID Energy Dose/Fx (Gy) #Fx Dose Correction (Gy) Total Dose (Gy) Start Date End Date Elapsed Days   IM Pelvis PTV_High 6X 2.5 28 / 28 0 70 7/13/2023 8/21/2023 39              Past Medical History:   Diagnosis Date    Depression     Prostate cancer          Past Surgical History:   Procedure Laterality Date    PROSTATE BIOPSY  02/14/2023         I have reviewed and updated the patient's past medical, surgical, family and social histories.     Review of patient's allergies indicates:  No Known Allergies      Current Outpatient Medications   Medication Sig Dispense Refill    buPROPion (WELLBUTRIN XL) 300 MG 24 hr tablet Take 1 tablet (300 mg total) by mouth once daily. 30 tablet 6    sildenafiL (VIAGRA) 25 MG tablet Take 2 tablets (50 mg total) by mouth daily as needed for Erectile Dysfunction. 60 tablet 3    tamsulosin (FLOMAX) 0.4 mg Cap Take 1 capsule (0.4 mg  total) by mouth nightly. 90 capsule 3     No current facility-administered medications for this visit.        Objective:      Physical Exam:   There were no vitals taken for this visit.      ECOG Performance status: (0) Fully active, able to carry on all predisease performance without restriction     Physical Exam  Constitutional:       Appearance: Normal appearance.      Comments: Thin appearance   HENT:      Head: Normocephalic and atraumatic.   Pulmonary:      Effort: Pulmonary effort is normal.   Skin:     General: Skin is warm and dry.   Neurological:      Mental Status: He is alert.   Psychiatric:         Mood and Affect: Mood normal.         Behavior: Behavior normal.         Thought Content: Thought content normal.         Judgment: Judgment normal.          Recent Labs:   Lab Results   Component Value Date    WBC 3.81 (L) 07/09/2024    RBC 4.31 (L) 07/09/2024    HGB 13.0 (L) 07/09/2024    HCT 39.0 (L) 07/09/2024    MCV 91 07/09/2024    MCH 30.2 07/09/2024    MCHC 33.3 07/09/2024    RDW 12.5 07/09/2024     07/09/2024    MPV 9.8 07/09/2024    IMMGR 0.3 07/09/2024    GRAN 2.8 07/09/2024    GRAN 72.7 07/09/2024    IGABS 0.01 07/09/2024    LYMPH 0.6 (L) 07/09/2024    LYMPH 14.7 (L) 07/09/2024    MONO 0.3 07/09/2024    MONO 8.4 07/09/2024    EOS 0.1 07/09/2024    BASO 0.02 07/09/2024    NRBC 0 07/09/2024    EOSINOPHIL 3.4 07/09/2024    BASOPHIL 0.5 07/09/2024    DIFFMETHOD Automated 07/09/2024       Lab Results   Component Value Date     07/09/2024    K 4.1 07/09/2024     07/09/2024    CO2 24 07/09/2024     07/09/2024    BUN 13 07/09/2024    CREATININE 0.8 07/09/2024    CALCIUM 9.3 07/09/2024    PROT 6.5 07/09/2024    ALBUMIN 4.0 07/09/2024    BILITOT 0.3 07/09/2024    ALKPHOS 54 (L) 07/09/2024    AST 19 07/09/2024    ALT 16 07/09/2024    ANIONGAP 9 07/09/2024    EGFRNORACEVR >60 07/09/2024        Lab Results   Component Value Date    PSADIAG 0.47 04/03/2024    PSADIAG 0.59 02/22/2024     "PSADIAG 0.03 09/14/2023    PSADIAG 0.06 07/28/2023    PSADIAG 18.8 (H) 04/28/2023    PSADIAG 22.1 (H) 04/04/2023        Cardiovascular Screening:  Primary care physician: Annabelle Garcia MD      The ASCVD Risk score (Boris BASSETT, et al., 2019) failed to calculate for the following reasons:    Cannot find a previous HDL lab    Cannot find a previous total cholesterol lab    ASCVD Risk Level: N/A    EKG: No results found for this or any previous visit.    High blood pressure = No  Antihypertensive agents: This patient does not have an active medication from one of the medication groupers.   Comments:     DM2: No  Antidiabetic agents: This patient does not have an active medication from one of the medication groupers.   Comments:     Hyperlipidemia: No  Lipid lowering agents: This patient does not have an active medication from one of the medication groupers.   Comments:     Antiplatelet therapy: No  Agent: This patient does not have an active medication from one of the medication groupers.   Comment:     There is no height or weight on file to calculate BMI.       Bone Health    No results found for: "[id number]"     No results found for this or any previous visit.       Vitamin D: 1000 IU daily  Calcium: Recommend 4 servings of dairy daily     Osteopenia/Osteoporosis: Unknown    Bone strengthening agent: None     Staging Imaging     Results for orders placed during the hospital encounter of 03/31/23    NM PET CT F 18 PYL PSMA, Midthigh to Vertex    Impression  Focal prostatic radiotracer avid lesion correlating with known malignancy.    No evidence of regional or distant radiotracer avid metastatic disease.    I, Sourav Vargas MD, attest that I reviewed and interpreted the images.    Electronically signed by resident: Cortes Sen  Date:    03/31/2023  Time:    15:23    Electronically signed by: Sourav Vargas  Date:    03/31/2023  Time:    15:42       No results found for this or any previous visit.      No results found " for this or any previous visit.       No results found for this or any previous visit.       I have personally reviewed the above imaging.     Path:   Reviewed pathology as documented above.    Genomic testing:     Germline genetic testing  Results for orders placed or performed in visit on 04/11/23   Genetic Misc Sendout Test, Blood   Result Value Ref Range    Miscellaneous Genetic Test Name Moses BRCA + CustomNext-Cancer + RNA     Genso Specimen Type Blood     Genetic counseling? Yes     Parental or Sibling Testing? No     Test Result See result image under hyperlink     Reference Lab SEE COMMENT         Somatic tumor genotyping:      ctDNA genotyping:       Diagnoses:     1. Prostate cancer            Assessment and Plan:      Cancer Staging   Prostate cancer  Staging form: Prostate, AJCC 8th Edition  - Clinical stage from 2/14/2023: Stage IIC (cT2b, cN0, cM0, PSA: 17.7, Grade Group: 3) - Signed by Patricia Willson NP on 4/28/2023    Jose Fischer is a 63 y.o. male, with a recent diagnosis of prostate cancer. Patient decided to pursue XRT and ADT. Now s/p XRT.      Given the degree of interference with his daily life and emotional distress with ADT, we held his last dose.   Here today for follow-up.    PSA 0.78  Testosterone is now normal (previously castrate).  Will hold ADT for now and watch PSA closely.      Will repeat PSA in 3 mos to continue to trend.  If rising, may consider oral ADT (cannot tolerate ADT injections), casodex, observation, and/or tumor board discussion to consider XRT boost, etc.      RTC 3 mos to see me.  Please draw labs the afternoon before (CBC, CMP, PSA, Testosterone).      Patient is in agreement with the proposed treatment plan. All questions were answered to the patient's satisfaction. Pt knows to call clinic if anything is needed before the next clinic visit.    More than 30 mins total time were spent during this encounter.    Ronald Elizabeth M.D., M.S., F.A.C.P.  Hematology and  Oncology Attending  [facility]  [facility]          Follow up:   Route Chart for Scheduling    Med Onc Chart Routing      Follow up with physician 3 months. RTC 3 mos to see me (virtual is fine).  Please draw labs the afternoon before (CBC, CMP, PSA, Testosterone).   Follow up with BLAISE    Infusion scheduling note    Injection scheduling note    Labs CBC, CMP and PSA   Scheduling:  Preferred lab:  Lab interval:  and testosterone-- all labs the day before his visit with me.   Imaging    Pharmacy appointment    Other referrals                        The patient location is: home The chief complaint leading to consultation is: cancer   Visit type: Virtual visit with synchronous audio and video  Total time: 30 mins  Each patient to whom he or she provides medical services by telemedicine is:  (1) informed of the relationship between the physician and patient and the respective role of any other health care provider with respect to management of the patient; and (2) notified that he or she may decline to receive medical services by telemedicine and may withdraw from such care at any time.

## 2024-07-10 ENCOUNTER — OFFICE VISIT (OUTPATIENT)
Dept: HEMATOLOGY/ONCOLOGY | Facility: CLINIC | Age: 64
End: 2024-07-10
Payer: COMMERCIAL

## 2024-07-10 DIAGNOSIS — C61 PROSTATE CANCER: Primary | ICD-10-CM

## 2024-07-10 LAB
COMPLEXED PSA SERPL-MCNC: 0.78 NG/ML (ref 0–4)
TESTOST SERPL-MCNC: 600 NG/DL (ref 304–1227)

## 2024-07-10 PROCEDURE — 99214 OFFICE O/P EST MOD 30 MIN: CPT | Mod: 95,,, | Performed by: INTERNAL MEDICINE

## 2024-07-10 PROCEDURE — G2211 COMPLEX E/M VISIT ADD ON: HCPCS | Mod: 95,,, | Performed by: INTERNAL MEDICINE

## 2024-07-10 RX ORDER — TAMSULOSIN HYDROCHLORIDE 0.4 MG/1
CAPSULE ORAL
Qty: 90 CAPSULE | Refills: 3 | Status: SHIPPED | OUTPATIENT
Start: 2024-07-10

## 2024-09-26 DIAGNOSIS — N52.9 ERECTILE DYSFUNCTION, UNSPECIFIED ERECTILE DYSFUNCTION TYPE: ICD-10-CM

## 2024-09-26 DIAGNOSIS — C61 PROSTATE CANCER: ICD-10-CM

## 2024-09-26 RX ORDER — SILDENAFIL 25 MG/1
50 TABLET, FILM COATED ORAL DAILY PRN
Qty: 60 TABLET | Refills: 3 | Status: SHIPPED | OUTPATIENT
Start: 2024-09-26 | End: 2025-09-26

## 2024-10-14 ENCOUNTER — LAB VISIT (OUTPATIENT)
Dept: LAB | Facility: HOSPITAL | Age: 64
End: 2024-10-14
Attending: INTERNAL MEDICINE
Payer: COMMERCIAL

## 2024-10-14 DIAGNOSIS — C61 PROSTATE CANCER: ICD-10-CM

## 2024-10-14 LAB
ALBUMIN SERPL BCP-MCNC: 3.9 G/DL (ref 3.5–5.2)
ALP SERPL-CCNC: 54 U/L (ref 55–135)
ALT SERPL W/O P-5'-P-CCNC: 15 U/L (ref 10–44)
ANION GAP SERPL CALC-SCNC: 7 MMOL/L (ref 8–16)
AST SERPL-CCNC: 20 U/L (ref 10–40)
BASOPHILS # BLD AUTO: 0.02 K/UL (ref 0–0.2)
BASOPHILS NFR BLD: 0.4 % (ref 0–1.9)
BILIRUB SERPL-MCNC: 0.4 MG/DL (ref 0.1–1)
BUN SERPL-MCNC: 13 MG/DL (ref 8–23)
CALCIUM SERPL-MCNC: 9.7 MG/DL (ref 8.7–10.5)
CHLORIDE SERPL-SCNC: 106 MMOL/L (ref 95–110)
CO2 SERPL-SCNC: 26 MMOL/L (ref 23–29)
COMPLEXED PSA SERPL-MCNC: 0.23 NG/ML (ref 0–4)
CREAT SERPL-MCNC: 0.8 MG/DL (ref 0.5–1.4)
DIFFERENTIAL METHOD BLD: ABNORMAL
EOSINOPHIL # BLD AUTO: 0.2 K/UL (ref 0–0.5)
EOSINOPHIL NFR BLD: 3.3 % (ref 0–8)
ERYTHROCYTE [DISTWIDTH] IN BLOOD BY AUTOMATED COUNT: 12.3 % (ref 11.5–14.5)
EST. GFR  (NO RACE VARIABLE): >60 ML/MIN/1.73 M^2
GLUCOSE SERPL-MCNC: 93 MG/DL (ref 70–110)
HCT VFR BLD AUTO: 38.4 % (ref 40–54)
HGB BLD-MCNC: 13 G/DL (ref 14–18)
IMM GRANULOCYTES # BLD AUTO: 0.02 K/UL (ref 0–0.04)
IMM GRANULOCYTES NFR BLD AUTO: 0.4 % (ref 0–0.5)
LYMPHOCYTES # BLD AUTO: 1.2 K/UL (ref 1–4.8)
LYMPHOCYTES NFR BLD: 21.5 % (ref 18–48)
MCH RBC QN AUTO: 30.2 PG (ref 27–31)
MCHC RBC AUTO-ENTMCNC: 33.9 G/DL (ref 32–36)
MCV RBC AUTO: 89 FL (ref 82–98)
MONOCYTES # BLD AUTO: 0.5 K/UL (ref 0.3–1)
MONOCYTES NFR BLD: 8.3 % (ref 4–15)
NEUTROPHILS # BLD AUTO: 3.6 K/UL (ref 1.8–7.7)
NEUTROPHILS NFR BLD: 66.1 % (ref 38–73)
NRBC BLD-RTO: 0 /100 WBC
PLATELET # BLD AUTO: 184 K/UL (ref 150–450)
PMV BLD AUTO: 9.3 FL (ref 9.2–12.9)
POTASSIUM SERPL-SCNC: 4.3 MMOL/L (ref 3.5–5.1)
PROT SERPL-MCNC: 6.5 G/DL (ref 6–8.4)
RBC # BLD AUTO: 4.3 M/UL (ref 4.6–6.2)
SODIUM SERPL-SCNC: 139 MMOL/L (ref 136–145)
TESTOST SERPL-MCNC: 673 NG/DL (ref 304–1227)
WBC # BLD AUTO: 5.39 K/UL (ref 3.9–12.7)

## 2024-10-14 PROCEDURE — 84403 ASSAY OF TOTAL TESTOSTERONE: CPT | Performed by: INTERNAL MEDICINE

## 2024-10-14 PROCEDURE — 84153 ASSAY OF PSA TOTAL: CPT | Performed by: INTERNAL MEDICINE

## 2024-10-14 PROCEDURE — 85025 COMPLETE CBC W/AUTO DIFF WBC: CPT | Performed by: INTERNAL MEDICINE

## 2024-10-14 PROCEDURE — 80053 COMPREHEN METABOLIC PANEL: CPT | Performed by: INTERNAL MEDICINE

## 2024-10-15 NOTE — PROGRESS NOTES
[facility] - PATIENT VISIT     Best Contact Phone Number(s): [phone] (home)       Cancer/Stage/TNM:    Cancer Staging   Prostate cancer  Staging form: Prostate, AJCC 8th Edition  - Clinical stage from 2/14/2023: Stage IIC (cT2b, cN0, cM0, PSA: 17.7, Grade Group: 3) - Signed by Patricia Willson NP on 4/28/2023        Reason for visit:   Prostate Cancer     HPI:      Jose Fischer is a 64 y.o. male, to clinic for follow up now s/p XRT for localized prostate cancer.  Now off ADT due to severe AEs.  Feeling better.       History has been obtained by chart review and discussion with the patient.       Oncology History   Prostate cancer   2/14/2023 Cancer Staged    Staging form: Prostate, AJCC 8th Edition  - Clinical stage from 2/14/2023: Stage IIC (cT2b, cN0, cM0, PSA: 17.7, Grade Group: 3)     3/14/2023 Initial Diagnosis    Prostate cancer     4/11/2023 Genetic Testing    [place] [address]-gene panel: Negative.      7/13/2023 - 8/21/2023 Radiation Therapy    Treating physician: nette hope    Course: C1 Pelvis 2023    Treatment Site Ref. ID Energy Dose/Fx (Gy) #Fx Dose Correction (Gy) Total Dose (Gy) Start Date End Date Elapsed Days   IM Pelvis PTV_High 6X 2.5 28 / 28 0 70 7/13/2023 8/21/2023 39              Past Medical History:   Diagnosis Date    Depression     Prostate cancer          Past Surgical History:   Procedure Laterality Date    PROSTATE BIOPSY  02/14/2023         I have reviewed and updated the patient's past medical, surgical, family and social histories.     Review of patient's allergies indicates:  No Known Allergies      Current Outpatient Medications   Medication Sig Dispense Refill    buPROPion (WELLBUTRIN XL) 300 MG 24 hr tablet Take 1 tablet (300 mg total) by mouth once daily. 30 tablet 6    sildenafiL (VIAGRA) 25 MG tablet Take 2 tablets (50 mg total) by mouth daily as needed for Erectile Dysfunction. 60 tablet 3    tamsulosin (FLOMAX) 0.4 mg Cap TAKE ONE CAPSULE BY MOUTH  ONE HOUR PRIOR TO BEDTIME 90 capsule 3     No current facility-administered medications for this visit.        Objective:       Recent Labs:   Lab Results   Component Value Date    WBC 5.39 10/14/2024    RBC 4.30 (L) 10/14/2024    HGB 13.0 (L) 10/14/2024    HCT 38.4 (L) 10/14/2024    MCV 89 10/14/2024    MCH 30.2 10/14/2024    MCHC 33.9 10/14/2024    RDW 12.3 10/14/2024     10/14/2024    MPV 9.3 10/14/2024    IMMGR 0.4 10/14/2024    GRAN 3.6 10/14/2024    GRAN 66.1 10/14/2024    IGABS 0.02 10/14/2024    LYMPH 1.2 10/14/2024    LYMPH 21.5 10/14/2024    MONO 0.5 10/14/2024    MONO 8.3 10/14/2024    EOS 0.2 10/14/2024    BASO 0.02 10/14/2024    NRBC 0 10/14/2024    EOSINOPHIL 3.3 10/14/2024    BASOPHIL 0.4 10/14/2024    DIFFMETHOD Automated 10/14/2024       Lab Results   Component Value Date     10/14/2024    K 4.3 10/14/2024     10/14/2024    CO2 26 10/14/2024    GLU 93 10/14/2024    BUN 13 10/14/2024    CREATININE 0.8 10/14/2024    CALCIUM 9.7 10/14/2024    PROT 6.5 10/14/2024    ALBUMIN 3.9 10/14/2024    BILITOT 0.4 10/14/2024    ALKPHOS 54 (L) 10/14/2024    AST 20 10/14/2024    ALT 15 10/14/2024    ANIONGAP 7 (L) 10/14/2024    EGFRNORACEVR >60 10/14/2024        Lab Results   Component Value Date    PSADIAG 0.23 10/14/2024    PSADIAG 0.78 07/09/2024    PSADIAG 0.47 04/03/2024    PSADIAG 0.59 02/22/2024    PSADIAG 0.03 09/14/2023    PSADIAG 0.06 07/28/2023    PSADIAG 18.8 (H) 04/28/2023    PSADIAG 22.1 (H) 04/04/2023        Cardiovascular Screening:  Primary care physician: Annabelle Garcia MD      The ASCVD Risk score (Boris BASSETT, et al., 2019) failed to calculate for the following reasons:    Cannot find a previous HDL lab    Cannot find a previous total cholesterol lab    ASCVD Risk Level: N/A    EKG: No results found for this or any previous visit.    High blood pressure = No  Antihypertensive agents: This patient does not have an active medication from one of the medication groupers.  "  Comments:     DM2: No  Antidiabetic agents: This patient does not have an active medication from one of the medication groupers.   Comments:     Hyperlipidemia: No  Lipid lowering agents: This patient does not have an active medication from one of the medication groupers.   Comments:     Antiplatelet therapy: No  Agent: This patient does not have an active medication from one of the medication groupers.   Comment:     There is no height or weight on file to calculate BMI.       Bone Health    No results found for: "[id number]"     No results found for this or any previous visit.       Vitamin D: 1000 IU daily  Calcium: Recommend 4 servings of dairy daily     Osteopenia/Osteoporosis: Unknown    Bone strengthening agent: None     Staging Imaging     Results for orders placed during the hospital encounter of 03/31/23    NM PET CT F 18 PYL PSMA, Midthigh to Vertex    Impression  Focal prostatic radiotracer avid lesion correlating with known malignancy.    No evidence of regional or distant radiotracer avid metastatic disease.    I, Sourav Vargas MD, attest that I reviewed and interpreted the images.    Electronically signed by resident: Cortes Sen  Date:    03/31/2023  Time:    15:23    Electronically signed by: Sourav Vargas  Date:    03/31/2023  Time:    15:42       No results found for this or any previous visit.      No results found for this or any previous visit.       No results found for this or any previous visit.       I have personally reviewed the above imaging.     Path:   Reviewed pathology as documented above.    Genomic testing:     Germline genetic testing  Results for orders placed or performed in visit on 04/11/23   Genetic Misc Sendout Test, Blood    Collection Time: 04/11/23  2:10 PM   Result Value Ref Range    Miscellaneous Genetic Test Name Moses BRCA + CustomNext-Cancer + RNA     Genso Specimen Type Blood     Genetic counseling? Yes     Parental or Sibling Testing? No     Test Result See result " image under hyperlink     Reference Lab SEE COMMENT         Somatic tumor genotyping:      ctDNA genotyping:       Diagnoses:     1. Prostate cancer          Assessment and Plan:      Cancer Staging   Prostate cancer  Staging form: Prostate, AJCC 8th Edition  - Clinical stage from 2/14/2023: Stage IIC (cT2b, cN0, cM0, PSA: 17.7, Grade Group: 3) - Signed by Patricia Willson NP on 4/28/2023    Jose Fischer is a 63 y.o. male, with a recent diagnosis of prostate cancer. Patient decided to pursue XRT and ADT. Now s/p XRT.    Given the degree of interference with his daily life and emotional distress with ADT, we held his last dose.   Here today for follow-up.    PSA dropped 0.78-->0.23 off ADT.  Testosterone is now normal (previously castrate) and clay 600-->673.      Will hold ADT for now and watch PSA.      Will repeat PSA in 4 mos to continue to trend.  If rising, may consider oral ADT (cannot tolerate ADT injections), casodex, observation, and/or tumor board discussion to consider XRT boost, etc.      RTC 4 mos to see me virtually.  Please draw labs the afternoon before (CBC, CMP, PSA, Testosterone).      Patient is in agreement with the proposed treatment plan. All questions were answered to the patient's satisfaction. Pt knows to call clinic if anything is needed before the next clinic visit.    More than 30 mins total time were spent during this encounter.    Ronald Elizabeth M.D., M.S., F.A.C.P.  Hematology and Oncology Attending  [facility]          Follow up:   Route Chart for Scheduling    Med Onc Chart Routing      Follow up with physician . RTC 4 mos to see me virtually.  Please draw labs locally a few days before (CBC, CMP, PSA, Testosterone).   Follow up with BLAISE    Infusion scheduling note    Injection scheduling note    Labs    Imaging    Pharmacy appointment    Other referrals                     The patient location is: home The chief complaint  leading to consultation is: cancer   Visit type: Virtual visit with synchronous audio and video  Total time: 30 mins    Each patient to whom he or she provides medical services by telemedicine is:  (1) informed of the relationship between the physician and patient and the respective role of any other health care provider with respect to management of the patient; and (2) notified that he or she may decline to receive medical services by telemedicine and may withdraw from such care at any time.

## 2024-10-16 ENCOUNTER — OFFICE VISIT (OUTPATIENT)
Dept: HEMATOLOGY/ONCOLOGY | Facility: CLINIC | Age: 64
End: 2024-10-16
Payer: COMMERCIAL

## 2024-10-16 DIAGNOSIS — C61 PROSTATE CANCER: Primary | ICD-10-CM

## 2024-10-16 PROCEDURE — 99214 OFFICE O/P EST MOD 30 MIN: CPT | Mod: 95,,, | Performed by: INTERNAL MEDICINE

## 2024-10-16 PROCEDURE — G2211 COMPLEX E/M VISIT ADD ON: HCPCS | Mod: 95,,, | Performed by: INTERNAL MEDICINE

## 2024-10-16 RX ORDER — BUPROPION HYDROCHLORIDE 150 MG/1
150 TABLET ORAL DAILY
Qty: 30 TABLET | Refills: 11 | Status: SHIPPED | OUTPATIENT
Start: 2024-10-16 | End: 2025-10-16

## 2024-12-10 ENCOUNTER — TELEPHONE (OUTPATIENT)
Dept: HEMATOLOGY/ONCOLOGY | Facility: CLINIC | Age: 64
End: 2024-12-10
Payer: COMMERCIAL

## 2025-02-17 ENCOUNTER — PATIENT MESSAGE (OUTPATIENT)
Dept: HEMATOLOGY/ONCOLOGY | Facility: CLINIC | Age: 65
End: 2025-02-17
Payer: COMMERCIAL

## 2025-02-19 ENCOUNTER — TELEPHONE (OUTPATIENT)
Dept: HEMATOLOGY/ONCOLOGY | Facility: CLINIC | Age: 65
End: 2025-02-19
Payer: COMMERCIAL

## 2025-02-19 NOTE — TELEPHONE ENCOUNTER
DILLAN LIU to see if pt wanted to do his labs this afternoon in [place] or tomorrow morning. Also told him we could do the labs at a later date and reschedule his appt with Dr. Elizabeth for after. Told pt to call back or message to let us know what he wanted to do.

## 2025-03-05 DIAGNOSIS — C61 PROSTATE CANCER: ICD-10-CM

## 2025-03-05 DIAGNOSIS — N52.9 ERECTILE DYSFUNCTION, UNSPECIFIED ERECTILE DYSFUNCTION TYPE: ICD-10-CM

## 2025-03-05 RX ORDER — SILDENAFIL 25 MG/1
50 TABLET, FILM COATED ORAL DAILY PRN
Qty: 60 TABLET | Refills: 3 | Status: SHIPPED | OUTPATIENT
Start: 2025-03-05 | End: 2026-03-05

## 2025-03-18 ENCOUNTER — PATIENT MESSAGE (OUTPATIENT)
Dept: HEMATOLOGY/ONCOLOGY | Facility: CLINIC | Age: 65
End: 2025-03-18
Payer: COMMERCIAL

## 2025-07-03 ENCOUNTER — PATIENT MESSAGE (OUTPATIENT)
Dept: HEMATOLOGY/ONCOLOGY | Facility: CLINIC | Age: 65
End: 2025-07-03
Payer: COMMERCIAL

## 2025-07-03 ENCOUNTER — TELEPHONE (OUTPATIENT)
Dept: HEMATOLOGY/ONCOLOGY | Facility: CLINIC | Age: 65
End: 2025-07-03
Payer: COMMERCIAL

## 2025-07-03 NOTE — TELEPHONE ENCOUNTER
NA LVM to get pt scheduled overdue f/u with Dr. Elizabeth. Told pt to call back or message to get virtual appt scheduled with Dr. Elizabeth.